# Patient Record
Sex: FEMALE | Race: WHITE | URBAN - METROPOLITAN AREA
[De-identification: names, ages, dates, MRNs, and addresses within clinical notes are randomized per-mention and may not be internally consistent; named-entity substitution may affect disease eponyms.]

---

## 2020-08-14 ENCOUNTER — EMERGENCY (EMERGENCY)
Facility: HOSPITAL | Age: 85
LOS: 1 days | Discharge: HOME | End: 2020-08-14
Attending: EMERGENCY MEDICINE
Payer: MEDICARE

## 2020-08-14 PROCEDURE — 99283 EMERGENCY DEPT VISIT LOW MDM: CPT | Mod: CS,GC

## 2020-08-14 RX ORDER — TETANUS AND DIPHTHERIA TOXOIDS ADSORBED 2; 2 [LF]/.5ML; [LF]/.5ML
0.5 INJECTION INTRAMUSCULAR ONCE
Refills: 0 | Status: COMPLETED | OUTPATIENT
Start: 2020-08-14 | End: 2020-08-14
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
## 2020-08-14 NOTE — ED PROVIDER NOTE - PHYSICAL EXAMINATION
General: NAD, well appearing  HEENT: Normocephalic, EOM intact  Neck: No apparent stiffness or JVD  Pulm: Chest wall symmetric and nontender, lungs clear to ascultation   Cardiac: Regular rate and rhythm  Abdomen: Nontender and nondistended  Skin: Skin in warm, Superficial scrrathes on hands and right leg, superficial and not bleeding  Neuro: No apparent motor or sensory deficits  Psych: Alert, oriented, and cooperative
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
## 2020-08-14 NOTE — ED PROVIDER NOTE - NS ED ROS FT
General: No fever, no chills  HEENT: No sensory changes, no sore throat  Neck: No neck pain, no stiffness  Resp: No SOB, no hemoptysis  Cardiovascular: No CP, No LOC  GI: No abdominal pain, No blood in stool  : No dysuria, no hematuria   MSK: No back pain, + limb pain  Neuro: No HA, No focal deficits

## 2020-08-14 NOTE — ED ADULT NURSE NOTE - CHPI ED NUR SYMPTOMS NEG
no change in level of consciousness/no loss of consciousness/no back pain/no chest wall tenderness/no chest pain/no abrasion/no blurred vision/no weakness/no seizure/no vomiting
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
## 2020-08-15 VITALS
OXYGEN SATURATION: 98 % | TEMPERATURE: 98 F | DIASTOLIC BLOOD PRESSURE: 78 MMHG | RESPIRATION RATE: 18 BRPM | SYSTOLIC BLOOD PRESSURE: 134 MMHG | HEART RATE: 76 BPM

## 2020-08-15 LAB — SARS-COV-2 RNA SPEC QL NAA+PROBE: SIGNIFICANT CHANGE UP

## 2020-08-15 PROCEDURE — G0378: CPT

## 2020-08-15 PROCEDURE — 73130 X-RAY EXAM OF HAND: CPT | Mod: 26,RT

## 2020-08-15 PROCEDURE — 99218: CPT | Mod: CS

## 2020-08-15 PROCEDURE — 90714 TD VACC NO PRESV 7 YRS+ IM: CPT

## 2020-08-15 PROCEDURE — 73130 X-RAY EXAM OF HAND: CPT

## 2020-08-15 PROCEDURE — 87635 SARS-COV-2 COVID-19 AMP PRB: CPT

## 2020-08-15 PROCEDURE — 99284 EMERGENCY DEPT VISIT MOD MDM: CPT | Mod: 25

## 2020-08-15 PROCEDURE — 71046 X-RAY EXAM CHEST 2 VIEWS: CPT | Mod: 26

## 2020-08-15 PROCEDURE — 90471 IMMUNIZATION ADMIN: CPT

## 2020-08-15 PROCEDURE — 71046 X-RAY EXAM CHEST 2 VIEWS: CPT

## 2020-08-15 RX ORDER — ACETAMINOPHEN 500 MG
650 TABLET ORAL ONCE
Refills: 0 | Status: COMPLETED | OUTPATIENT
Start: 2020-08-15 | End: 2020-08-15

## 2020-08-15 RX ADMIN — TETANUS AND DIPHTHERIA TOXOIDS ADSORBED 0.5 MILLILITER(S): 2; 2 INJECTION INTRAMUSCULAR at 01:16

## 2020-08-15 RX ADMIN — Medication 650 MILLIGRAM(S): at 02:02
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
## 2020-08-15 NOTE — ED ADULT NURSE REASSESSMENT NOTE - NS ED NURSE REASSESS COMMENT FT1
Pt ambulated without difficulty with 1 assist to bathroom, steady gait. Pt. remains smiling, calm, cooperative.
Pt. discharged by Dr. Sprague. Pt. to be transported to the [facility] by arabella at bedside.
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
## 2020-08-15 NOTE — ED CDU PROVIDER DISPOSITION NOTE - PATIENT PORTAL LINK FT
You can access the FollowMyHealth Patient Portal offered by [facility] by registering at the following website: [email or web address]. By joining "[employer]’s FollowMyHealth portal, you will also be able to view your health information using other applications (apps) compatible with our system.

## 2020-08-16 NOTE — ED POST DISCHARGE NOTE - RESULT SUMMARY
CXR- severe thoracic body height loss which is likely chronic however an acute component cannot be excluded
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
## 2021-10-10 NOTE — ED ADULT NURSE NOTE - NSIMPLEMENTINTERV_GEN_ALL_ED
(M6) obeys commands
Implemented All Fall with Harm Risk Interventions:  [place] to call system. Call bell, personal items and telephone within reach. Instruct patient to call for assistance. Room bathroom lighting operational. Non-slip footwear when patient is off stretcher. Physically safe environment: no spills, clutter or unnecessary equipment. Stretcher in lowest position, wheels locked, appropriate side rails in place. Provide visual cue, wrist band, yellow gown, etc. Monitor gait and stability. Monitor for mental status changes and reorient to person, place, and time. Review medications for side effects contributing to fall risk. Reinforce activity limits and safety measures with patient and family. Provide visual clues: red socks.

## 2021-10-16 ENCOUNTER — EMERGENCY (EMERGENCY)
Facility: HOSPITAL | Age: 86
LOS: 1 days | Discharge: HOME | End: 2021-10-16
Attending: STUDENT IN AN ORGANIZED HEALTH CARE EDUCATION/TRAINING PROGRAM
Payer: MEDICARE

## 2021-10-16 VITALS
HEART RATE: 87 BPM | SYSTOLIC BLOOD PRESSURE: 146 MMHG | DIASTOLIC BLOOD PRESSURE: 69 MMHG | RESPIRATION RATE: 17 BRPM | OXYGEN SATURATION: 95 %

## 2021-10-16 VITALS
DIASTOLIC BLOOD PRESSURE: 53 MMHG | RESPIRATION RATE: 18 BRPM | SYSTOLIC BLOOD PRESSURE: 91 MMHG | HEART RATE: 110 BPM | OXYGEN SATURATION: 93 % | TEMPERATURE: 98 F

## 2021-10-16 LAB
ALBUMIN SERPL ELPH-MCNC: 4.4 G/DL — SIGNIFICANT CHANGE UP (ref 3.3–5.2)
ALP SERPL-CCNC: 65 U/L — SIGNIFICANT CHANGE UP (ref 40–120)
ALT FLD-CCNC: 13 U/L — SIGNIFICANT CHANGE UP
ANION GAP SERPL CALC-SCNC: 16 MMOL/L — SIGNIFICANT CHANGE UP (ref 5–17)
ANISOCYTOSIS BLD QL: SLIGHT — SIGNIFICANT CHANGE UP
APPEARANCE UR: CLEAR — SIGNIFICANT CHANGE UP
AST SERPL-CCNC: 23 U/L — SIGNIFICANT CHANGE UP
BACTERIA # UR AUTO: ABNORMAL
BASE EXCESS BLDV CALC-SCNC: -3.9 MMOL/L — LOW (ref -2–3)
BASOPHILS # BLD AUTO: 0 K/UL — SIGNIFICANT CHANGE UP (ref 0–0.2)
BASOPHILS NFR BLD AUTO: 0 % — SIGNIFICANT CHANGE UP (ref 0–2)
BILIRUB SERPL-MCNC: 0.6 MG/DL — SIGNIFICANT CHANGE UP (ref 0.4–2)
BILIRUB UR-MCNC: NEGATIVE — SIGNIFICANT CHANGE UP
BUN SERPL-MCNC: 30.3 MG/DL — HIGH (ref 8–20)
CA-I SERPL-SCNC: 1.06 MMOL/L — LOW (ref 1.15–1.33)
CALCIUM SERPL-MCNC: 9.2 MG/DL — SIGNIFICANT CHANGE UP (ref 8.6–10.2)
CHLORIDE BLDV-SCNC: 99 MMOL/L — SIGNIFICANT CHANGE UP (ref 98–107)
CHLORIDE SERPL-SCNC: 95 MMOL/L — LOW (ref 98–107)
CO2 SERPL-SCNC: 24 MMOL/L — SIGNIFICANT CHANGE UP (ref 22–29)
COLOR SPEC: YELLOW — SIGNIFICANT CHANGE UP
CREAT SERPL-MCNC: 1.37 MG/DL — HIGH (ref 0.5–1.3)
DIFF PNL FLD: ABNORMAL
EOSINOPHIL # BLD AUTO: 0 K/UL — SIGNIFICANT CHANGE UP (ref 0–0.5)
EOSINOPHIL NFR BLD AUTO: 0 % — SIGNIFICANT CHANGE UP (ref 0–6)
EPI CELLS # UR: SIGNIFICANT CHANGE UP
GAS PNL BLDV: 130 MMOL/L — LOW (ref 136–145)
GAS PNL BLDV: SIGNIFICANT CHANGE UP
GLUCOSE BLDV-MCNC: 116 MG/DL — HIGH (ref 70–99)
GLUCOSE SERPL-MCNC: 105 MG/DL — HIGH (ref 70–99)
GLUCOSE UR QL: 250 MG/DL
GRAN CASTS # UR COMP ASSIST: ABNORMAL /LPF
HCO3 BLDV-SCNC: 21 MMOL/L — LOW (ref 22–29)
HCT VFR BLD CALC: 43.1 % — SIGNIFICANT CHANGE UP (ref 34.5–45)
HCT VFR BLDA CALC: 41 % — SIGNIFICANT CHANGE UP (ref 34–46)
HGB BLD CALC-MCNC: 13.5 G/DL — SIGNIFICANT CHANGE UP (ref 11.7–16.1)
HGB BLD-MCNC: 14.4 G/DL — SIGNIFICANT CHANGE UP (ref 11.5–15.5)
KETONES UR-MCNC: NEGATIVE — SIGNIFICANT CHANGE UP
LACTATE BLDV-MCNC: 1.7 MMOL/L — SIGNIFICANT CHANGE UP (ref 0.5–2)
LEUKOCYTE ESTERASE UR-ACNC: ABNORMAL
LYMPHOCYTES # BLD AUTO: 1.05 K/UL — SIGNIFICANT CHANGE UP (ref 1–3.3)
LYMPHOCYTES # BLD AUTO: 5.2 % — LOW (ref 13–44)
MACROCYTES BLD QL: SLIGHT — SIGNIFICANT CHANGE UP
MANUAL SMEAR VERIFICATION: SIGNIFICANT CHANGE UP
MCHC RBC-ENTMCNC: 31.9 PG — SIGNIFICANT CHANGE UP (ref 27–34)
MCHC RBC-ENTMCNC: 33.4 GM/DL — SIGNIFICANT CHANGE UP (ref 32–36)
MCV RBC AUTO: 95.4 FL — SIGNIFICANT CHANGE UP (ref 80–100)
MONOCYTES # BLD AUTO: 1.42 K/UL — HIGH (ref 0–0.9)
MONOCYTES NFR BLD AUTO: 7 % — SIGNIFICANT CHANGE UP (ref 2–14)
NEUTROPHILS # BLD AUTO: 17.78 K/UL — HIGH (ref 1.8–7.4)
NEUTROPHILS NFR BLD AUTO: 87.8 % — HIGH (ref 43–77)
NITRITE UR-MCNC: NEGATIVE — SIGNIFICANT CHANGE UP
OVALOCYTES BLD QL SMEAR: SLIGHT — SIGNIFICANT CHANGE UP
PCO2 BLDV: 37 MMHG — LOW (ref 39–42)
PH BLDV: 7.37 — SIGNIFICANT CHANGE UP (ref 7.32–7.43)
PH UR: 6.5 — SIGNIFICANT CHANGE UP (ref 5–8)
PLAT MORPH BLD: NORMAL — SIGNIFICANT CHANGE UP
PLATELET # BLD AUTO: 283 K/UL — SIGNIFICANT CHANGE UP (ref 150–400)
PO2 BLDV: 144 MMHG — HIGH (ref 25–45)
POIKILOCYTOSIS BLD QL AUTO: SLIGHT — SIGNIFICANT CHANGE UP
POTASSIUM BLDV-SCNC: 4.2 MMOL/L — SIGNIFICANT CHANGE UP (ref 3.5–5.1)
POTASSIUM SERPL-MCNC: 4.3 MMOL/L — SIGNIFICANT CHANGE UP (ref 3.5–5.3)
POTASSIUM SERPL-SCNC: 4.3 MMOL/L — SIGNIFICANT CHANGE UP (ref 3.5–5.3)
PROT SERPL-MCNC: 7.4 G/DL — SIGNIFICANT CHANGE UP (ref 6.6–8.7)
PROT UR-MCNC: 100 MG/DL
RBC # BLD: 4.52 M/UL — SIGNIFICANT CHANGE UP (ref 3.8–5.2)
RBC # FLD: 13.5 % — SIGNIFICANT CHANGE UP (ref 10.3–14.5)
RBC BLD AUTO: ABNORMAL
RBC CASTS # UR COMP ASSIST: SIGNIFICANT CHANGE UP /HPF (ref 0–4)
SAO2 % BLDV: 99 % — SIGNIFICANT CHANGE UP
SARS-COV-2 RNA SPEC QL NAA+PROBE: SIGNIFICANT CHANGE UP
SODIUM SERPL-SCNC: 135 MMOL/L — SIGNIFICANT CHANGE UP (ref 135–145)
SP GR SPEC: 1.01 — SIGNIFICANT CHANGE UP (ref 1.01–1.02)
TSH SERPL-MCNC: 3.95 UIU/ML — SIGNIFICANT CHANGE UP (ref 0.27–4.2)
UROBILINOGEN FLD QL: NEGATIVE MG/DL — SIGNIFICANT CHANGE UP
WBC # BLD: 20.25 K/UL — HIGH (ref 3.8–10.5)
WBC # FLD AUTO: 20.25 K/UL — HIGH (ref 3.8–10.5)
WBC UR QL: SIGNIFICANT CHANGE UP

## 2021-10-16 PROCEDURE — 71045 X-RAY EXAM CHEST 1 VIEW: CPT

## 2021-10-16 PROCEDURE — 85025 COMPLETE CBC W/AUTO DIFF WBC: CPT

## 2021-10-16 PROCEDURE — 82803 BLOOD GASES ANY COMBINATION: CPT

## 2021-10-16 PROCEDURE — 99284 EMERGENCY DEPT VISIT MOD MDM: CPT | Mod: 25

## 2021-10-16 PROCEDURE — 71045 X-RAY EXAM CHEST 1 VIEW: CPT | Mod: 26

## 2021-10-16 PROCEDURE — 87086 URINE CULTURE/COLONY COUNT: CPT

## 2021-10-16 PROCEDURE — 70450 CT HEAD/BRAIN W/O DYE: CPT | Mod: 26,MA

## 2021-10-16 PROCEDURE — 81001 URINALYSIS AUTO W/SCOPE: CPT

## 2021-10-16 PROCEDURE — 85018 HEMOGLOBIN: CPT

## 2021-10-16 PROCEDURE — 87635 SARS-COV-2 COVID-19 AMP PRB: CPT

## 2021-10-16 PROCEDURE — 99284 EMERGENCY DEPT VISIT MOD MDM: CPT

## 2021-10-16 PROCEDURE — 83605 ASSAY OF LACTIC ACID: CPT

## 2021-10-16 PROCEDURE — 74176 CT ABD & PELVIS W/O CONTRAST: CPT | Mod: 26,MA

## 2021-10-16 PROCEDURE — 82947 ASSAY GLUCOSE BLOOD QUANT: CPT

## 2021-10-16 PROCEDURE — 85014 HEMATOCRIT: CPT

## 2021-10-16 PROCEDURE — 84132 ASSAY OF SERUM POTASSIUM: CPT

## 2021-10-16 PROCEDURE — 70450 CT HEAD/BRAIN W/O DYE: CPT | Mod: MA

## 2021-10-16 PROCEDURE — 80053 COMPREHEN METABOLIC PANEL: CPT

## 2021-10-16 PROCEDURE — 84443 ASSAY THYROID STIM HORMONE: CPT

## 2021-10-16 PROCEDURE — 87040 BLOOD CULTURE FOR BACTERIA: CPT

## 2021-10-16 PROCEDURE — 82330 ASSAY OF CALCIUM: CPT

## 2021-10-16 PROCEDURE — 36415 COLL VENOUS BLD VENIPUNCTURE: CPT

## 2021-10-16 PROCEDURE — 84295 ASSAY OF SERUM SODIUM: CPT

## 2021-10-16 PROCEDURE — 82435 ASSAY OF BLOOD CHLORIDE: CPT

## 2021-10-16 PROCEDURE — 74176 CT ABD & PELVIS W/O CONTRAST: CPT | Mod: MA

## 2021-10-16 RX ORDER — SODIUM CHLORIDE 9 MG/ML
1000 INJECTION INTRAMUSCULAR; INTRAVENOUS; SUBCUTANEOUS ONCE
Refills: 0 | Status: COMPLETED | OUTPATIENT
Start: 2021-10-16 | End: 2021-10-16

## 2021-10-16 RX ADMIN — SODIUM CHLORIDE 1000 MILLILITER(S): 9 INJECTION INTRAMUSCULAR; INTRAVENOUS; SUBCUTANEOUS at 15:53

## 2021-10-16 NOTE — ED ADULT NURSE NOTE - NSIMPLEMENTINTERV_GEN_ALL_ED
Implemented All Fall Risk Interventions:  [place] to call system. Call bell, personal items and telephone within reach. Instruct patient to call for assistance. Room bathroom lighting operational. Non-slip footwear when patient is off stretcher. Physically safe environment: no spills, clutter or unnecessary equipment. Stretcher in lowest position, wheels locked, appropriate side rails in place. Provide visual cue, wrist band, yellow gown, etc. Monitor gait and stability. Monitor for mental status changes and reorient to person, place, and time. Review medications for side effects contributing to fall risk. Reinforce activity limits and safety measures with patient and family.

## 2021-10-16 NOTE — ED ADULT NURSE NOTE - RESPIRATORY ASSESSMENT
Will need to find a new provider- she still has not see coumadin clinic like I asked- I can't keep refilling this without labs- last refill. - - -

## 2021-10-16 NOTE — ED PROVIDER NOTE - OBJECTIVE STATEMENT
89yo woman PMH HTN, hypothyroidism, osteoporosis, arthritis, macular degeneration, dementia (A&Ox1-2 at baseline) was sent to the ED from [place] for evaluation for worsening confusion, slurring words, incontinence, difficulty walking (walks independently at baseline) x 2 days. Pt was seen by staff (Deb) at baseline 2 days ago. She was altered yesterday but declined evaluation in hospital but pt had worsening difficulty with ambulating and holding utensils and was sent to the ED.  No recent fever, cough, n/v/d.

## 2021-10-16 NOTE — ED PROVIDER NOTE - CLINICAL SUMMARY MEDICAL DECISION MAKING FREE TEXT BOX
89yo woman presents with worsening confusion and denies any complaints on exam with no focal neurologic deficits except disorientation. Will evaluation for metabolic/infectious etiology. Will also consider NPH/intra-cranial abnormality and obtain CTH.

## 2021-10-16 NOTE — ED PROVIDER NOTE - NSFOLLOWUPINSTRUCTIONS_ED_ALL_ED_FT
You were seen and evaluated in the emergency room for confusion.    Please follow up with your primary care provider in the next few days.  You should be evaluated by a neurologist. Call the number attached to make an appointment.     Continue all home medications as prescribed.    Drink plenty of fluids to stay hydrated.    Return to the Emergency Department immediately if you have fever, slurred speech, difficulty swallowing, change in vision, weakness in arms or legs, severe headache, or any new and concerning symptoms or concerns.     Please read all attached.

## 2021-10-16 NOTE — ED ADULT NURSE REASSESSMENT NOTE - NS ED NURSE REASSESS COMMENT FT1
assumed care o fpt Pt in no apparent distress at this time. Airway patent, breathing spontaneous and nonlabored. Pt resting in stretcher, no complaints at this time

## 2021-10-16 NOTE — ED PROVIDER NOTE - NSICDXPASTMEDICALHX_GEN_ALL_CORE_FT
PAST MEDICAL HISTORY:  Arthritis     Degeneration macular     Dementia     HTN (hypertension)     Hypothyroidism     Osteoporosis

## 2021-10-16 NOTE — ED PROVIDER NOTE - PROGRESS NOTE DETAILS
pt's family at bedside. discussed results of elevated WBC and UA not suggestive of UTI. due to unreliable exam, will obtain CT a/p to evaluate for occult intra-abdominal pathology. with no focal neurologic deficits and no gross abnormalities on CTH, less likely intra-cranial etiology. Discussed option of admission/obs for further evaluation with MRI but after discussion family, they wish for pt to be in familiar location at nursing home unless further imaging will  and will be able to take pt to be evaluated by outpt neurology if needed. pt had an episode of emesis but with no further nausea upon examination. no recurrent vomiting. CT a/p with no intra-abdominal pathology. discussed option of further work up with LP to evaluate for confusion/AMS but with no fever or other symptoms, pt's family declined. discussed YOSI likely pre-renal with family and need for hydration. pt s/p fluids. will discharge patient home at this time and family at bedside to take back to living facility. printed out copies of results for patient to take home. discussed return precautions and need for outpatient follow up with neurology.

## 2021-10-16 NOTE — ED PROVIDER NOTE - PATIENT PORTAL LINK FT
You can access the FollowMyHealth Patient Portal offered by [facility] by registering at the following website: [email or web address]. By joining [employer]’s FollowMyHealth portal, you will also be able to view your health information using other applications (apps) compatible with our system.

## 2021-10-16 NOTE — ED ADULT NURSE NOTE - SUICIDE SCREENING DEPRESSION
PAST MEDICAL HISTORY:  Adult Hypothyroidism     Arthritis     Borderline Diabetes Mellitus     Chronic Asthma     Cigarette Smoker     Diverticulosis     H/O: HTN     High Cholesterol     RA (Rheumatoid Arthritis)     Tooth Abscess Negative

## 2021-10-16 NOTE — ED PROVIDER NOTE - PHYSICAL EXAMINATION
General: elderly woman in no acute distress  Head: normocephalic, atraumatic  Eyes: pinpoint pupils bilaterally, EOMI  Mouth: moist mucous membranes  Neck: supple neck  CV: normal rate and rhythm, no LE edema, peripheral pulses 2+ bilateral UE and LE  Respiratory: clear to auscultation bilaterally, no crackles  Abdomen: soft, nontender, nondistended  : no suprapubic tenderness, no CVAT  MSK: no joint deformities  Neuro: alert and oriented x1, speech is clear but repetitive, symmetric smile, intermittently follows commands 5/5 strength in bilateral UE and LE, ambulates with minimal assistance with no gait instability  Skin: no rash noted

## 2021-10-16 NOTE — ED ADULT NURSE NOTE - OBJECTIVE STATEMENT
pt brought to ED by [facility] mobile stroke unit for facial droop, upon arrival pt alert to self only per family that is pts baseline, denies any pain or complaints, no weakness or facial droop noted upon arrival to ED, Respirations even and unlabored, pt stable. Pt brrought to ED by [facility] mobile stroke unit from [place] for evaluation for worsening confusion, slurring words, incontinence, difficulty walking (walks independently at baseline) x 2 days. Per niece She was altered yesterday and had worsening speech, difficulty with ambulating and holding utensils and was sent to the ED for eval of possible UTI niece states she noticed pt has not been properly cleaning herself at facility last few days and thinks she may possibly have UTI.. Upon arrival to ED pt able to ambulate to bathroom, no weakness or facial droop noted upon assessment, niece at bedside states pts speech and memory of past is slightly off from baseline

## 2021-10-16 NOTE — ED ADULT NURSE NOTE - CAS EDN DISCHARGE INTERVENTIONS
History     Chief Complaint:  Suicidal    The history is provided by the patient and the police.      Jihan Gill is a 41 year old female who presents via police for suicidal ideation. The patient was originally placed in room 17, however she was found in the room looking for something to hang herself so she was moved to [facility]. The patient states that she is staying at a hotel with her boyfriend in [place]. She left the hotel today to possibly go jump off a bridge, however she changed her mind and flagged down the police (Officer Kavin) while on the bridge who brought her in today. She states that she has been hearing voices and seeing people that are not there. She has multiple abrasions to her left lower arm. She states that she got a tattoo there 2 weeks ago and she no longer believes in the saying so she was trying to scratch it off with a griffin pin. The patient states that she has not taken medications yesterday or today. She states that she has been in treatment multiple times and when she gets out she starts having hallucinations again.     Allergies:  Abilify  Compazine  Dramamine  Reglan  Seroquel     Medications:    Acetaminophen (tylenol) 325 mg tablet  Buprenorphine hcl-naloxone hcl (suboxone) 8-2 mg per film  Cariprazine (vraylar) 1.5 mg caps capsule  Gabapentin (neurontin) 300 mg capsule  Hydroxyzine (atarax) 25 mg tablet  Ondansetron (zofran-odt) 4 mg odt tab  Vortioxetine (trintellix/brintellix) 20 mg tablet    Past Medical History:    Arthritis  Depressive disorder  Opiate addiction  Seizures  Urinary calculus  Bipolar disorder  Acute hepatitis C virus infection  Lithium overdose  Suicide attempt  Methadone overdose  Benzodiazepine dependence   Degenerative of lumbar or lumbosacral intervertebral disc    Past Surgical History:     section  ENT surgery  GYN surgery  Tonsillectomy    Family History:    History reviewed. No pertinent family history.    Social History:  Patient is  "  Tobacco Use: No  Alcohol Use: Yes  PCP: Physician No Ref-Primary     Review of Systems   Constitutional: Negative for fever.   Skin: Positive for wound.   Psychiatric/Behavioral: Positive for hallucinations, self-injury and suicidal ideas.   All other systems reviewed and are negative.    Physical Exam   First Vitals:  Patient Vitals for the past 24 hrs:   BP Temp Temp src Pulse Heart Rate Resp SpO2 Height Weight   04/03/19 1505 127/84 -- -- 131 -- 16 100 % -- --   04/03/19 1025 131/88 98.4  F (36.9  C) Oral -- 116 16 100 % 1.676 m (5' 6\") 74.8 kg (165 lb)     Physical Exam  General: No distress.   Head: No signs of trauma.   Mouth/Throat: Oropharynx moist.   Eyes: Conjunctivae are normal. Pupils are equal..   Neck: Normal range of motion.    Resp:No respiratory distress.   MSK: Normal range of motion. No obvious deformity.  Neuro: The patient is alert and interactive. LITTLEJOHN. Speech normal. GCS 15  Skin: No lesion or sign of trauma noted.   Psych: Admits to suicidal ideation and hearing voices. Appears anxious and tearful.      Emergency Department Course     Laboratory:  CBC:  WBC 6.4, HGB 11.4 low,   CMP: Potassium 3.2 low, Glucose 121 high,  high,  high, o/w WNL. (Creatinine 0.66)  Salicylate Level: <2  Acetaminophen Level: <2  Alcohol Ethyl: <0.01  Urine Drug Screen: Amphetamine positive.     Interventions:  1153: Neurontin 600mg PO  1156: Zyprexa 10mg PO  1236: Vortioxetine 20mg PO  1237: Vraylar 1.5mg PO     Emergency Department Course:  10:34 AM Nursing notes and vitals reviewed.  I performed an exam of the patient as documented above.     Patient will be admitted to psych.    Impression & Plan      Medical Decision Making:  Jihan Gill is a 41 year old female who presents with suicidal ideation.    Differential diagnosis broadly includes:    Drug- including opioids or alcohol. Drug abuse screen is negative as above including any toxic ingestion such as Tylenol, " salicylates or ethanol.There is no alcohol on board.     Metabolic- she is not hypoxic and shows no signs of hyper or hypoglycemia.      Intercranial abnormality- she had no history of trauma and no exam findings consistent with head trauma, therefore a CT head was not warranted. There were no focal neurologic findings consistent with this either.     Vascular- No history consistent with stroke, subarachnoid hemorrhage, or coronary ischemia. No headache or focal findings.    Psychiatric -Such as schizophrenia or major depression, this is most likely diagnosis given that there are no obvious findings on physical exam or on labs.    Infectious- Meningitis, encephalitis or bacteriemia, with her completely normal neuro exam, physical exam, it is very unlikely to be an infectious process at this time.     As phychiatric is the most likely etiology, DEC was consulted and they agreed that she required in patient work up and evaluation for her own safety. She will be admit to psych.  Diagnosis:    ICD-10-CM    1. Suicidal ideation R45.851 CBC with platelets differential     Comprehensive metabolic panel     Salicylate level     Acetaminophen level     Alcohol ethyl     Drug abuse screen 77 urine ([place],[facility])   2. Psychosis, unspecified psychosis type (H) F29        Disposition:  Admitted to psych.    I, Bradley Aasen, am serving as a scribe on 4/3/2019 at 10:34 AM to personally document services performed by [place] Joy MD based on my observations and the provider's statements to me.          Mt Joy MD  04/03/19 0118     IV discontinued, cath removed intact

## 2021-10-18 LAB
CULTURE RESULTS: SIGNIFICANT CHANGE UP
SPECIMEN SOURCE: SIGNIFICANT CHANGE UP

## 2021-10-19 NOTE — ED POST DISCHARGE NOTE - RESULT SUMMARY
CXR- No acute radiographic findings, small bilateral pleural effusions with adjacent atelectasis evident on concurrent abdominal CT

## 2021-10-19 NOTE — ED POST DISCHARGE NOTE - DETAILS
Pt currently residing at [facility], spoke to pt's nurse Evelina who states she will review these findings with the physician who is in house today Pt currently residing at [facility], spoke to pt's nurse Evelina who states she will review these findings with the physician who is in house today. Faxed XRAY report to facility [phone]

## 2021-10-21 LAB
CULTURE RESULTS: SIGNIFICANT CHANGE UP
CULTURE RESULTS: SIGNIFICANT CHANGE UP
SPECIMEN SOURCE: SIGNIFICANT CHANGE UP
SPECIMEN SOURCE: SIGNIFICANT CHANGE UP

## 2021-10-28 PROBLEM — M19.90 UNSPECIFIED OSTEOARTHRITIS, UNSPECIFIED SITE: Chronic | Status: ACTIVE | Noted: 2021-10-16

## 2021-10-28 PROBLEM — F03.90 UNSPECIFIED DEMENTIA, UNSPECIFIED SEVERITY, WITHOUT BEHAVIORAL DISTURBANCE, PSYCHOTIC DISTURBANCE, MOOD DISTURBANCE, AND ANXIETY: Chronic | Status: ACTIVE | Noted: 2021-10-16

## 2021-10-28 PROBLEM — I10 ESSENTIAL (PRIMARY) HYPERTENSION: Chronic | Status: ACTIVE | Noted: 2021-10-16

## 2021-10-28 PROBLEM — H35.30 UNSPECIFIED MACULAR DEGENERATION: Chronic | Status: ACTIVE | Noted: 2021-10-16

## 2021-10-28 PROBLEM — E03.9 HYPOTHYROIDISM, UNSPECIFIED: Chronic | Status: ACTIVE | Noted: 2021-10-16

## 2021-10-28 PROBLEM — Z00.00 ENCOUNTER FOR PREVENTIVE HEALTH EXAMINATION: Status: ACTIVE | Noted: 2021-10-28

## 2021-10-28 PROBLEM — M81.0 AGE-RELATED OSTEOPOROSIS WITHOUT CURRENT PATHOLOGICAL FRACTURE: Chronic | Status: ACTIVE | Noted: 2021-10-16

## 2021-11-24 ENCOUNTER — APPOINTMENT (OUTPATIENT)
Dept: NEUROLOGY | Facility: CLINIC | Age: 86
End: 2021-11-24
Payer: MEDICARE

## 2021-11-24 VITALS
WEIGHT: 110 LBS | DIASTOLIC BLOOD PRESSURE: 80 MMHG | SYSTOLIC BLOOD PRESSURE: 140 MMHG | HEIGHT: 59 IN | BODY MASS INDEX: 22.18 KG/M2

## 2021-11-24 DIAGNOSIS — G30.1 ALZHEIMER'S DISEASE WITH LATE ONSET: ICD-10-CM

## 2021-11-24 DIAGNOSIS — Z86.39 PERSONAL HISTORY OF OTHER ENDOCRINE, NUTRITIONAL AND METABOLIC DISEASE: ICD-10-CM

## 2021-11-24 DIAGNOSIS — Z86.79 PERSONAL HISTORY OF OTHER DISEASES OF THE CIRCULATORY SYSTEM: ICD-10-CM

## 2021-11-24 DIAGNOSIS — Z78.9 OTHER SPECIFIED HEALTH STATUS: ICD-10-CM

## 2021-11-24 DIAGNOSIS — Z82.0 FAMILY HISTORY OF EPILEPSY AND OTHER DISEASES OF THE NERVOUS SYSTEM: ICD-10-CM

## 2021-11-24 DIAGNOSIS — Z87.891 PERSONAL HISTORY OF NICOTINE DEPENDENCE: ICD-10-CM

## 2021-11-24 DIAGNOSIS — F02.80 ALZHEIMER'S DISEASE WITH LATE ONSET: ICD-10-CM

## 2021-11-24 PROCEDURE — 99204 OFFICE O/P NEW MOD 45 MIN: CPT

## 2021-11-24 RX ORDER — ASPIRIN 81 MG/1
81 TABLET ORAL DAILY
Qty: 90 | Refills: 3 | Status: ACTIVE | COMMUNITY
Start: 2021-11-24

## 2021-11-24 RX ORDER — AMLODIPINE BESYLATE 2.5 MG/1
2.5 TABLET ORAL
Refills: 0 | Status: ACTIVE | COMMUNITY

## 2021-11-24 RX ORDER — LEVOTHYROXINE SODIUM 0.03 MG/1
25 TABLET ORAL
Refills: 0 | Status: ACTIVE | COMMUNITY

## 2021-11-24 RX ORDER — SENNOSIDES 8.6 MG/1
8.6 TABLET ORAL
Refills: 0 | Status: ACTIVE | COMMUNITY

## 2021-11-24 RX ORDER — GALANTAMINE 12 MG/1
TABLET, FILM COATED ORAL
Refills: 0 | Status: ACTIVE | COMMUNITY

## 2021-11-24 RX ORDER — ACETAMINOPHEN 325 MG/1
325 TABLET ORAL
Refills: 0 | Status: ACTIVE | COMMUNITY

## 2021-11-24 RX ORDER — ROSUVASTATIN CALCIUM 5 MG/1
5 TABLET, FILM COATED ORAL
Refills: 0 | Status: ACTIVE | COMMUNITY

## 2021-11-24 RX ORDER — DICLOFENAC SODIUM 10 MG/G
1 GEL TOPICAL
Refills: 0 | Status: ACTIVE | COMMUNITY

## 2021-11-24 NOTE — ASSESSMENT
[FreeTextEntry1] : This is a 89-year-old woman who has a history of dementia. She lives in an assisted-living facility in a dementia unit. She had altered mental status with question of slurred speech likely in the setting of dehydration with possible undiagnosed infection. It is not clear to me that this was a TIA, however is difficult to tell based only on the description without seeing the patient when she was having her neurologic symptoms. To this and to be safe I would start a daily baby aspirin. I would continue galantamine for the dementia. I will see her back in the office in 3 months for followup.

## 2021-11-24 NOTE — DATA REVIEWED
[de-identified] : I reviewed images of a head CT that was done October 16, 2021. There is a significant amount of motion artifact and significantly degrade the images. Images that were able to be read did not show evidence for acute stroke mass or bleed. There did appear to be likely small vessel ischemic changes and atrophy consistent with age.\par \par I also reviewed online records from her ER visit from October 16, 2021

## 2021-11-24 NOTE — REVIEW OF SYSTEMS
[Confused or Disoriented] : confusion [Memory Lapses or Loss] : memory loss [Decr. Concentrating Ability] : decreased concentrating ability [As Noted in HPI] : as noted in HPI [Loss Of Hearing] : hearing loss [Negative] : Heme/Lymph

## 2021-11-24 NOTE — PHYSICAL EXAM
[General Appearance - Alert] : alert [General Appearance - In No Acute Distress] : in no acute distress [General Appearance - Well Nourished] : well nourished [General Appearance - Well Developed] : well developed [Person] : oriented to person [Place] : disoriented to place [Time] : disoriented to time [Short Term Intact] : short term memory impaired [Remote Intact] : remote memory intact [Registration Intact] : recent registration memory intact [Concentration Intact] : a decrease in concentrating ability was observed [Visual Intact] : visual attention was ~T not ~L decreased [Naming Objects] : no difficulty naming common objects [Fluency] : fluency intact [Comprehension] : comprehension intact [Current Events] : inadequate knowledge of current events [Cranial Nerves Optic (II)] : visual acuity intact bilaterally,  visual fields full to confrontation, pupils equal round and reactive to light [Cranial Nerves Oculomotor (III)] : extraocular motion intact [Cranial Nerves Trigeminal (V)] : facial sensation intact symmetrically [Cranial Nerves Facial (VII)] : face symmetrical [Cranial Nerves Vestibulocochlear (VIII)] : hearing was intact bilaterally [Cranial Nerves Glossopharyngeal (IX)] : tongue and palate midline [Cranial Nerves Accessory (XI - Cranial And Spinal)] : head turning and shoulder shrug symmetric [Cranial Nerves Hypoglossal (XII)] : there was no tongue deviation with protrusion [____] : [unfilled] mm [Motor Tone] : muscle tone was normal in all four extremities [Motor Strength] : muscle strength was normal in all four extremities [Involuntary Movements] : no involuntary movements were seen [No Muscle Atrophy] : normal bulk in all four extremities [Paresis Pronator Drift Right-Sided] : no pronator drift on the right [Paresis Pronator Drift Left-Sided] : no pronator drift on the left [Motor Strength Upper Extremities Bilaterally] : strength was normal in both upper extremities [Motor Strength Lower Extremities Bilaterally] : strength was normal in both lower extremities [Sensation Tactile Decrease] : light touch was intact [Sensation Pain / Temperature Decrease] : pain and temperature was intact [Sensation Vibration Decrease] : vibration was intact [Proprioception] : proprioception was intact [Abnormal Walk] : normal gait [Balance] : balance was intact [Tremor] : no tremor present [Coordination - Dysmetria Impaired Finger-to-Nose Bilateral] : not present [2+] : Ankle jerk left 2+ [Plantar Reflex Right Only] : normal on the right [Plantar Reflex Left Only] : normal on the left [Sclera] : the sclera and conjunctiva were normal [Extraocular Movements] : extraocular movements were intact [Full Visual Field] : full visual field [FreeTextEntry1] : pinpoint pupils

## 2021-11-24 NOTE — CONSULT LETTER
[Dear  ___] : Dear ~BREANNA, [Consult Letter:] : I had the pleasure of evaluating your patient, [unfilled]. [Please see my note below.] : Please see my note below. [Consult Closing:] : Thank you very much for allowing me to participate in the care of this patient.  If you have any questions, please do not hesitate to contact me. [Sincerely,] : Sincerely, [FreeTextEntry3] : Fausto Parker M.D., Ph.D. DPN-N\par [facility]\par Neurology at [place]\par Medical Director of Stroke Services\par [facility]\par

## 2021-11-24 NOTE — HISTORY OF PRESENT ILLNESS
[FreeTextEntry1] : Initial office visit November 24, 2021:\par This is a 89-year-old woman who presents today for neurologic evaluation. She was taken to the hospital on October 16 for altered mental status and dysarthria. She had a workup at the hospital which showed a white blood cell count of 20 as well as elevated BUN and creatinine. A head CT did not show any gross findings are as significantly degraded by motion artifact. She has a history of dementia, diagnosed 3 years ago. She is on galantamine for this. She is here today doing better than she was in the hospital but not completely back to her baseline. She is here for a neurologic evaluation.

## 2022-01-31 ENCOUNTER — TRANSCRIPTION ENCOUNTER (OUTPATIENT)
Age: 87
End: 2022-01-31

## 2022-02-01 ENCOUNTER — TRANSCRIPTION ENCOUNTER (OUTPATIENT)
Age: 87
End: 2022-02-01

## 2022-02-01 ENCOUNTER — INPATIENT (INPATIENT)
Facility: HOSPITAL | Age: 87
LOS: 15 days | Discharge: NURSING, REHAB OR LONG-TERM CARE | DRG: 480 | End: 2022-02-17
Attending: FAMILY MEDICINE
Payer: MEDICARE

## 2022-02-01 VITALS
OXYGEN SATURATION: 96 % | SYSTOLIC BLOOD PRESSURE: 174 MMHG | DIASTOLIC BLOOD PRESSURE: 116 MMHG | TEMPERATURE: 98 F | HEART RATE: 87 BPM | RESPIRATION RATE: 16 BRPM

## 2022-02-01 DIAGNOSIS — S72.142A DISPLACED INTERTROCHANTERIC FRACTURE OF LEFT FEMUR, INITIAL ENCOUNTER FOR CLOSED FRACTURE: ICD-10-CM

## 2022-02-01 LAB
ABO RH CONFIRMATION: SIGNIFICANT CHANGE UP
ALBUMIN SERPL ELPH-MCNC: 4 G/DL — SIGNIFICANT CHANGE UP (ref 3.3–5.2)
ALP SERPL-CCNC: 54 U/L — SIGNIFICANT CHANGE UP (ref 40–120)
ALT FLD-CCNC: 10 U/L — SIGNIFICANT CHANGE UP
ANION GAP SERPL CALC-SCNC: 14 MMOL/L — SIGNIFICANT CHANGE UP (ref 5–17)
APTT BLD: 23.9 SEC — LOW (ref 27.5–35.5)
AST SERPL-CCNC: 17 U/L — SIGNIFICANT CHANGE UP
BASOPHILS # BLD AUTO: 0.05 K/UL — SIGNIFICANT CHANGE UP (ref 0–0.2)
BASOPHILS NFR BLD AUTO: 0.3 % — SIGNIFICANT CHANGE UP (ref 0–2)
BILIRUB SERPL-MCNC: 0.2 MG/DL — LOW (ref 0.4–2)
BLD GP AB SCN SERPL QL: SIGNIFICANT CHANGE UP
BUN SERPL-MCNC: 26.9 MG/DL — HIGH (ref 8–20)
CALCIUM SERPL-MCNC: 9 MG/DL — SIGNIFICANT CHANGE UP (ref 8.6–10.2)
CHLORIDE SERPL-SCNC: 103 MMOL/L — SIGNIFICANT CHANGE UP (ref 98–107)
CO2 SERPL-SCNC: 20 MMOL/L — LOW (ref 22–29)
CREAT SERPL-MCNC: 0.88 MG/DL — SIGNIFICANT CHANGE UP (ref 0.5–1.3)
EOSINOPHIL # BLD AUTO: 0.01 K/UL — SIGNIFICANT CHANGE UP (ref 0–0.5)
EOSINOPHIL NFR BLD AUTO: 0.1 % — SIGNIFICANT CHANGE UP (ref 0–6)
GLUCOSE SERPL-MCNC: 161 MG/DL — HIGH (ref 70–99)
HCT VFR BLD CALC: 31.8 % — LOW (ref 34.5–45)
HGB BLD-MCNC: 10 G/DL — LOW (ref 11.5–15.5)
IMM GRANULOCYTES NFR BLD AUTO: 0.8 % — SIGNIFICANT CHANGE UP (ref 0–1.5)
INR BLD: 1.05 RATIO — SIGNIFICANT CHANGE UP (ref 0.88–1.16)
LYMPHOCYTES # BLD AUTO: 0.62 K/UL — LOW (ref 1–3.3)
LYMPHOCYTES # BLD AUTO: 3.4 % — LOW (ref 13–44)
MCHC RBC-ENTMCNC: 30.1 PG — SIGNIFICANT CHANGE UP (ref 27–34)
MCHC RBC-ENTMCNC: 31.4 GM/DL — LOW (ref 32–36)
MCV RBC AUTO: 95.8 FL — SIGNIFICANT CHANGE UP (ref 80–100)
MONOCYTES # BLD AUTO: 1.47 K/UL — HIGH (ref 0–0.9)
MONOCYTES NFR BLD AUTO: 8.1 % — SIGNIFICANT CHANGE UP (ref 2–14)
NEUTROPHILS # BLD AUTO: 15.88 K/UL — HIGH (ref 1.8–7.4)
NEUTROPHILS NFR BLD AUTO: 87.3 % — HIGH (ref 43–77)
PLATELET # BLD AUTO: 270 K/UL — SIGNIFICANT CHANGE UP (ref 150–400)
POTASSIUM SERPL-MCNC: 4.7 MMOL/L — SIGNIFICANT CHANGE UP (ref 3.5–5.3)
POTASSIUM SERPL-SCNC: 4.7 MMOL/L — SIGNIFICANT CHANGE UP (ref 3.5–5.3)
PROT SERPL-MCNC: 6.3 G/DL — LOW (ref 6.6–8.7)
PROTHROM AB SERPL-ACNC: 12.1 SEC — SIGNIFICANT CHANGE UP (ref 10.6–13.6)
RBC # BLD: 3.32 M/UL — LOW (ref 3.8–5.2)
RBC # FLD: 13.7 % — SIGNIFICANT CHANGE UP (ref 10.3–14.5)
SARS-COV-2 RNA SPEC QL NAA+PROBE: SIGNIFICANT CHANGE UP
SODIUM SERPL-SCNC: 137 MMOL/L — SIGNIFICANT CHANGE UP (ref 135–145)
WBC # BLD: 18.17 K/UL — HIGH (ref 3.8–10.5)
WBC # FLD AUTO: 18.17 K/UL — HIGH (ref 3.8–10.5)

## 2022-02-01 PROCEDURE — 27245 TREAT THIGH FRACTURE: CPT | Mod: LT

## 2022-02-01 PROCEDURE — 99223 1ST HOSP IP/OBS HIGH 75: CPT

## 2022-02-01 PROCEDURE — 99221 1ST HOSP IP/OBS SF/LOW 40: CPT | Mod: 57,25

## 2022-02-01 PROCEDURE — 73502 X-RAY EXAM HIP UNI 2-3 VIEWS: CPT | Mod: 26,LT

## 2022-02-01 PROCEDURE — 99221 1ST HOSP IP/OBS SF/LOW 40: CPT | Mod: 25,57

## 2022-02-01 PROCEDURE — 27095 INJECTION FOR HIP X-RAY: CPT | Mod: LT

## 2022-02-01 PROCEDURE — 99285 EMERGENCY DEPT VISIT HI MDM: CPT

## 2022-02-01 PROCEDURE — 93010 ELECTROCARDIOGRAM REPORT: CPT

## 2022-02-01 PROCEDURE — 72192 CT PELVIS W/O DYE: CPT | Mod: 26,MD,59

## 2022-02-01 PROCEDURE — 72125 CT NECK SPINE W/O DYE: CPT | Mod: 26,MA

## 2022-02-01 PROCEDURE — 70450 CT HEAD/BRAIN W/O DYE: CPT | Mod: 26,MA

## 2022-02-01 PROCEDURE — 74177 CT ABD & PELVIS W/CONTRAST: CPT | Mod: 26,MA

## 2022-02-01 PROCEDURE — 71260 CT THORAX DX C+: CPT | Mod: 26,MA

## 2022-02-01 PROCEDURE — 27245 TREAT THIGH FRACTURE: CPT | Mod: AS,LT

## 2022-02-01 PROCEDURE — 71045 X-RAY EXAM CHEST 1 VIEW: CPT | Mod: 26

## 2022-02-01 DEVICE — KIT SYRINGE TRAUMACEM V PLUS STRL: Type: IMPLANTABLE DEVICE | Status: FUNCTIONAL

## 2022-02-01 DEVICE — BLADE TFNA HELICAL 90MM: Type: IMPLANTABLE DEVICE | Status: FUNCTIONAL

## 2022-02-01 DEVICE — GRAFT BONE INJ TRAUMACEM TM V PLUS BONE CEMENT: Type: IMPLANTABLE DEVICE | Status: FUNCTIONAL

## 2022-02-01 DEVICE — GWIRE 3.2X400MM SS: Type: IMPLANTABLE DEVICE | Status: FUNCTIONAL

## 2022-02-01 DEVICE — NAIL CANN TFNA 130 DEG 12X170MM STRL: Type: IMPLANTABLE DEVICE | Status: FUNCTIONAL

## 2022-02-01 DEVICE — SCREW LOKG STRL 5X34MM: Type: IMPLANTABLE DEVICE | Status: FUNCTIONAL

## 2022-02-01 DEVICE — GRAFT BONE INJ CANN TRAUMACEM FOR TFNA: Type: IMPLANTABLE DEVICE | Status: FUNCTIONAL

## 2022-02-01 RX ORDER — ATORVASTATIN CALCIUM 80 MG/1
20 TABLET, FILM COATED ORAL AT BEDTIME
Refills: 0 | Status: DISCONTINUED | OUTPATIENT
Start: 2022-02-01 | End: 2022-02-01

## 2022-02-01 RX ORDER — SENNA PLUS 8.6 MG/1
2 TABLET ORAL AT BEDTIME
Refills: 0 | Status: DISCONTINUED | OUTPATIENT
Start: 2022-02-01 | End: 2022-02-01

## 2022-02-01 RX ORDER — ATORVASTATIN CALCIUM 80 MG/1
20 TABLET, FILM COATED ORAL AT BEDTIME
Refills: 0 | Status: ACTIVE | OUTPATIENT
Start: 2022-02-01 | End: 2022-12-31

## 2022-02-01 RX ORDER — CEFAZOLIN SODIUM 1 G
2000 VIAL (EA) INJECTION
Refills: 0 | Status: COMPLETED | OUTPATIENT
Start: 2022-02-01 | End: 2022-02-02

## 2022-02-01 RX ORDER — OXYCODONE HYDROCHLORIDE 5 MG/1
5 TABLET ORAL
Refills: 0 | Status: DISCONTINUED | OUTPATIENT
Start: 2022-02-01 | End: 2022-02-04

## 2022-02-01 RX ORDER — ACETAMINOPHEN 500 MG
975 TABLET ORAL ONCE
Refills: 0 | Status: COMPLETED | OUTPATIENT
Start: 2022-02-01 | End: 2022-02-01

## 2022-02-01 RX ORDER — OXYCODONE HYDROCHLORIDE 5 MG/1
10 TABLET ORAL
Refills: 0 | Status: DISCONTINUED | OUTPATIENT
Start: 2022-02-01 | End: 2022-02-04

## 2022-02-01 RX ORDER — AMLODIPINE BESYLATE 2.5 MG/1
2.5 TABLET ORAL DAILY
Refills: 0 | Status: DISCONTINUED | OUTPATIENT
Start: 2022-02-01 | End: 2022-02-07

## 2022-02-01 RX ORDER — ACETAMINOPHEN 500 MG
975 TABLET ORAL EVERY 8 HOURS
Refills: 0 | Status: ACTIVE | OUTPATIENT
Start: 2022-02-01 | End: 2022-12-31

## 2022-02-01 RX ORDER — HYDROMORPHONE HYDROCHLORIDE 2 MG/ML
0.5 INJECTION INTRAMUSCULAR; INTRAVENOUS; SUBCUTANEOUS EVERY 4 HOURS
Refills: 0 | Status: DISCONTINUED | OUTPATIENT
Start: 2022-02-01 | End: 2022-02-07

## 2022-02-01 RX ORDER — SODIUM CHLORIDE 9 MG/ML
1000 INJECTION INTRAMUSCULAR; INTRAVENOUS; SUBCUTANEOUS
Refills: 0 | Status: DISCONTINUED | OUTPATIENT
Start: 2022-02-01 | End: 2022-02-03

## 2022-02-01 RX ORDER — SODIUM CHLORIDE 9 MG/ML
250 INJECTION INTRAMUSCULAR; INTRAVENOUS; SUBCUTANEOUS ONCE
Refills: 0 | Status: COMPLETED | OUTPATIENT
Start: 2022-02-01 | End: 2022-02-01

## 2022-02-01 RX ORDER — LEVOTHYROXINE SODIUM 125 MCG
25 TABLET ORAL DAILY
Refills: 0 | Status: ACTIVE | OUTPATIENT
Start: 2022-02-01 | End: 2022-12-31

## 2022-02-01 RX ORDER — FENTANYL CITRATE 50 UG/ML
25 INJECTION INTRAVENOUS
Refills: 0 | Status: DISCONTINUED | OUTPATIENT
Start: 2022-02-01 | End: 2022-02-01

## 2022-02-01 RX ORDER — AMLODIPINE BESYLATE 2.5 MG/1
2.5 TABLET ORAL DAILY
Refills: 0 | Status: DISCONTINUED | OUTPATIENT
Start: 2022-02-01 | End: 2022-02-01

## 2022-02-01 RX ORDER — SENNA PLUS 8.6 MG/1
2 TABLET ORAL AT BEDTIME
Refills: 0 | Status: ACTIVE | OUTPATIENT
Start: 2022-02-01 | End: 2022-12-31

## 2022-02-01 RX ORDER — MORPHINE SULFATE 50 MG/1
4 CAPSULE, EXTENDED RELEASE ORAL
Refills: 0 | Status: DISCONTINUED | OUTPATIENT
Start: 2022-02-01 | End: 2022-02-04

## 2022-02-01 RX ORDER — TRANEXAMIC ACID 100 MG/ML
1000 INJECTION, SOLUTION INTRAVENOUS ONCE
Refills: 0 | Status: ACTIVE | OUTPATIENT
Start: 2022-02-01 | End: 2022-02-01

## 2022-02-01 RX ORDER — LEVOTHYROXINE SODIUM 125 MCG
25 TABLET ORAL DAILY
Refills: 0 | Status: DISCONTINUED | OUTPATIENT
Start: 2022-02-01 | End: 2022-02-01

## 2022-02-01 RX ORDER — ENOXAPARIN SODIUM 100 MG/ML
40 INJECTION SUBCUTANEOUS DAILY
Refills: 0 | Status: ACTIVE | OUTPATIENT
Start: 2022-02-02 | End: 2023-01-01

## 2022-02-01 RX ADMIN — Medication 975 MILLIGRAM(S): at 23:08

## 2022-02-01 RX ADMIN — SODIUM CHLORIDE 75 MILLILITER(S): 9 INJECTION INTRAMUSCULAR; INTRAVENOUS; SUBCUTANEOUS at 23:04

## 2022-02-01 RX ADMIN — ATORVASTATIN CALCIUM 20 MILLIGRAM(S): 80 TABLET, FILM COATED ORAL at 23:09

## 2022-02-01 RX ADMIN — SENNA PLUS 2 TABLET(S): 8.6 TABLET ORAL at 23:08

## 2022-02-01 RX ADMIN — SODIUM CHLORIDE 250 MILLILITER(S): 9 INJECTION INTRAMUSCULAR; INTRAVENOUS; SUBCUTANEOUS at 12:29

## 2022-02-01 RX ADMIN — Medication 1 DROP(S): at 23:15

## 2022-02-01 RX ADMIN — SODIUM CHLORIDE 75 MILLILITER(S): 9 INJECTION INTRAMUSCULAR; INTRAVENOUS; SUBCUTANEOUS at 17:01

## 2022-02-01 RX ADMIN — Medication 100 MILLIGRAM(S): at 23:09

## 2022-02-01 RX ADMIN — Medication 975 MILLIGRAM(S): at 12:29

## 2022-02-01 NOTE — DISCHARGE NOTE PROVIDER - NSDCCPCAREPLAN_GEN_ALL_CORE_FT
PRINCIPAL DISCHARGE DIAGNOSIS  Diagnosis: Intertrochanteric fracture of left hip  Assessment and Plan of Treatment:       SECONDARY DISCHARGE DIAGNOSES  Diagnosis: Dementia  Assessment and Plan of Treatment:     Diagnosis: Severe protein-calorie malnutrition  Assessment and Plan of Treatment:     Diagnosis: History of TIAs  Assessment and Plan of Treatment:

## 2022-02-01 NOTE — DISCHARGE NOTE PROVIDER - HOSPITAL COURSE
89 yr old female with dementia, hypothyroidism, TIA presented after a fall, imaging revealed an acute left intertrochanteric fracture, labs with WBC 18 on admission. She is s/p IM nail of left hip. Post operative course was complicated by anemia, Hb 7.4. PRBC transfusion was given. Also developed urinary retention requiring Stover placement. She developed agitation and pulled out Stover, required constant observation. Her Hb did not improve, was 7.4 on 2/3, hence additional PRBC ordered and CT abdomen/pelvis with hip hematoma.  Hgb has been stable.  S/p IMN on 2/1.      Acute lt comminuted intertrochanteric fracture due to fall s/p IM Nails on feb 1   - pain control  - dvt ppx  -PT eval   - dispo: [facility].  - palliative care team following .   - cont pt/ot daily as able.      Dementia  with delirium  - with intermittent  episode of agitation , impulsive behaviour   -calm for last 24 hr  - cont risperidone , seroquel prn   pain meds.       Urinary retention stover in place.    -continue Flomax.      Vit d deficiency    -started ergocalciferol 36706 unit weekly     Constipation - bowel regimen.  KUB ordered cont miralxa senna  will give lactulose if still no BM by evening add enema     hx TIA:   On aspirin and statin    Hypothyroidism: Continue Synthroid     Severe protein manjeet mlanutrition   cont diet and ensure   MVI with minerals      Anemia post op   iron studies reviewed   cont  ferrous sulfate vit c daily   Hb stable    DVT ppx - on heparin   disposition: [facility] byrd for safe disposition 89 yr old female with dementia, hypothyroidism, TIA presented after a fall, imaging revealed an acute left intertrochanteric fracture, labs with WBC 18 on admission. She is s/p IM nail of left hip. Post operative course was complicated by anemia, Hb 7.4. PRBC transfusion was given. Also developed urinary retention requiring Stover placement. She developed agitation and pulled out Stover, required constant observation. Her Hb did not improve, was 7.4 on 2/3, hence additional PRBC ordered and CT abdomen/pelvis with hip hematoma.  Hgb has been stable.  S/p IMN on 2/1.      Acute lt comminuted intertrochanteric fracture due to fall s/p IM Nails on feb 1   - pain control  - dvt ppx  -PT eval dispo: [facility].  - palliative care team following .   - cont pt/ot daily as able.      Dementia  with delirium  - with intermittent  episode of agitation , impulsive behaviour   -calm  - cont risperidone , seroquel prn   pain meds.       Urinary retention stover in place.    -continue Flomax.      Vit d deficiency    -started ergocalciferol 18434 unit weekly     Constipation - bowel regimen.  KUB ordered cont miralxa senna  will give lactulose if still no BM by evening add enema     hx TIA:   On aspirin and statin    Hypothyroidism: Continue Synthroid     Severe protein manjeet mlanutrition   cont diet and ensure   MVI with minerals      Anemia post op   iron studies reviewed   cont  ferrous sulfate vit c daily   Hb stable    DVT ppx - on heparin   disposition: [facility] byrd for safe disposition

## 2022-02-01 NOTE — ED ADULT NURSE NOTE - NSIMPLEMENTINTERV_GEN_ALL_ED
Implemented All Fall with Harm Risk Interventions:  [place] to call system. Call bell, personal items and telephone within reach. Instruct patient to call for assistance. Room bathroom lighting operational. Non-slip footwear when patient is off stretcher. Physically safe environment: no spills, clutter or unnecessary equipment. Stretcher in lowest position, wheels locked, appropriate side rails in place. Provide visual cue, wrist band, yellow gown, etc. Monitor gait and stability. Monitor for mental status changes and reorient to person, place, and time. Review medications for side effects contributing to fall risk. Reinforce activity limits and safety measures with patient and family. Provide visual clues: red socks.

## 2022-02-01 NOTE — ED ADULT TRIAGE NOTE - CHIEF COMPLAINT QUOTE
BIBEMS from [facility] s/p unwitnessed fall with L hip pain, + deformity/shortening, on ASA. Patient baseline dementia alert to person only. Unknown LOC or if she hit her head. Patient very [place].

## 2022-02-01 NOTE — CONSULT NOTE ADULT - SUBJECTIVE AND OBJECTIVE BOX
Pt Name: DANISH MICHAELS    MRN: [id number]      Patient is a 89y Female presenting to the emergency department with a chief complaint of fall   Patient is a 89y old  Female who presents with a chief complaint of fall.  patient with hx of dementia, hypothyroid, TIA who presented s/p unwitnessed fall. patient lives in dementia day, with niece who is giving hx.  Staff went into room at 630 and found patient on ground c/o hip pain       REVIEW OF SYSTEMS    left leg pain, otherwise UTO due to hx     PAST MEDICAL & SURGICAL HISTORY:  PAST MEDICAL & SURGICAL HISTORY:  HTN (hypertension)    Dementia    Osteoporosis    Arthritis    Hypothyroidism    Degeneration macular    Poor historian    [place] (hard of hearing)        Allergies: No Known Allergies      Medications: acetaminophen     Tablet .. 975 milliGRAM(s) Oral once  sodium chloride 0.9% Bolus 250 milliLiter(s) IV Bolus once      FAMILY HISTORY:  : non-contributory    Social History: lives in dementia day     Ambulation: Walking independently                           10.0   18.17 )-----------( 270      ( 01 Feb 2022 08:31 )             31.8       02-01    137  |  103  |  26.9<H>  ----------------------------<  161<H>  4.7   |  20.0<L>  |  0.88    Ca    9.0      01 Feb 2022 08:31    TPro  6.3<L>  /  Alb  4.0  /  TBili  0.2<L>  /  DBili  x   /  AST  17  /  ALT  10  /  AlkPhos  54  02-01      Vital Signs Last 24 Hrs  T(C): 36.6 (01 Feb 2022 07:04), Max: 36.6 (01 Feb 2022 07:04)  T(F): 97.9 (01 Feb 2022 07:04), Max: 97.9 (01 Feb 2022 07:04)  HR: 87 (01 Feb 2022 07:04) (87 - 87)  BP: 174/116 (01 Feb 2022 07:04) (174/116 - 174/116)  BP(mean): --  RR: 16 (01 Feb 2022 07:04) (16 - 16)  SpO2: 96% (01 Feb 2022 07:04) (96% - 96%)    Daily     Daily       PHYSICAL EXAM:      Appearance: Confused responsive, in no acute distress.    Musculoskeletal:         Left Lower Extremity: positive external rotation of leg with shortening,  no tenderness to knee, or ankle. foot warm and perfused.    positive chronic vascular changes to lower leg       Imaging Studies:  2 view left hip with pelvis reveals positive IT fx     A/P:  Pt is a 89y Female with found to have Left IT fx     PLAN:   * NPO for OR  * optimization for OR   *Pain control  * Bed rest    Case discussed with Dr. Martino

## 2022-02-01 NOTE — DISCHARGE NOTE PROVIDER - ATTENDING DISCHARGE PHYSICAL EXAMINATION:
T(C): 36.6 (02-17-22 @ 11:16), Max: 36.8 (02-17-22 @ 05:18)  HR: 71 (02-17-22 @ 11:16) (71 - 74)  BP: 135/67 (02-17-22 @ 11:16) (126/60 - 135/67)  RR: 18 (02-17-22 @ 05:18) (18 - 18)  SpO2: 92% (02-17-22 @ 11:16) (90% - 92%)    GEN - NAD  HEENT - NCAT, EOMI, FREDY,   RESP - CTA BL, no wheeze/rhonchi/crackles.   CARDIO - NS1S2, RRR. No murmurs  ABD - Soft/Non tender/Non distended. Normal BS x4 quadrants.   Ext - No MARK.   MSK - full ROM of BL upper and lower extremities without pain or restriction  Neuro - cn 2-12 grossly intact. no visible seizure activity appreciated.  Skin - clean, dry, intact. no rashes   Psych- AAOx1. confuse,calm,coop

## 2022-02-01 NOTE — H&P ADULT - EXTREMITIES COMMENTS
L leg shortened and externally rotated. Very painful any ROM.    NT AT, FROM no pain All else.  Sensation intact throughout.

## 2022-02-01 NOTE — ED ADULT NURSE NOTE - OBJECTIVE STATEMENT
89 F, nad, confused, [place], alert to self only, noted to have externally rotated and shortened LLE with swelling to left hip noted, tender to palpation at site and pain with movement of LLE.

## 2022-02-01 NOTE — H&P ADULT - MENTAL STATUS
A&OX person only.    Answers questions appropriately but is unaware where she is, where she lives or came from, why she is here.

## 2022-02-01 NOTE — DISCHARGE NOTE PROVIDER - NSDCMRMEDTOKEN_GEN_ALL_CORE_FT
amLODIPine 2.5 mg oral tablet: 1 tab(s) orally once a day  aspirin 81 mg oral tablet: 1 tab(s) orally once a day  Balmex topical ointment: Apply topically to affected area 2 times a day  diclofenac 1% topical gel: Apply topically to affected area once a day  Refresh Relieva ophthalmic solution: 1 drop(s) to each affected eye 2 times a day right eye  rosuvastatin 5 mg oral tablet: 1 tab(s) orally once a day  Senna 8.6 mg oral tablet: 1 tab(s) orally 2 times a day  Synthroid 25 mcg (0.025 mg) oral tablet: 1 tab(s) orally once a day   acetaminophen 325 mg oral tablet: 3 tab(s) orally every 8 hours  ascorbic acid 500 mg oral tablet: 1 tab(s) orally 2 times a day  aspirin 81 mg oral tablet, chewable: 1 tab(s) orally once a day  atorvastatin 20 mg oral tablet: 1 tab(s) orally once a day (at bedtime)  bisacodyl 10 mg rectal suppository: 1 suppository(ies) rectal once a day, As needed, Constipation  celecoxib 200 mg oral capsule: 1 cap(s) orally once a day  enoxaparin: 40 milligram(s) subcutaneous once a day for 17 days  ergocalciferol: 74922 unit(s) orally once a week  ferrous sulfate 325 mg (65 mg elemental iron) oral tablet: 1 tab(s) orally once a day  levothyroxine 25 mcg (0.025 mg) oral tablet: 1 tab(s) orally once a day  melatonin 3 mg oral tablet: 1 tab(s) orally once a day (at bedtime)  mineral oil rectal enema: 133 milliliter(s) rectal once, As needed, persistent constipation  ocular lubricant ophthalmic solution: 1 drop(s) to each affected eye every 6 hours  polyethylene glycol 3350 oral powder for reconstitution: 17 gram(s) orally 2 times a day  QUEtiapine: 12.5 milligram(s) orally every 8 hours, As Needed agitation  risperiDONE 0.5 mg oral tablet, disintegratin tab(s) orally once a day  senna oral tablet: 2 tab(s) orally once a day (at bedtime)  tamsulosin 0.4 mg oral capsule: 1 cap(s) orally once a day (at bedtime)

## 2022-02-01 NOTE — ED ADULT NURSE NOTE - NSICDXPASTMEDICALHX_GEN_ALL_CORE_FT
PAST MEDICAL HISTORY:  Arthritis     Degeneration macular     Dementia     [place] (hard of hearing)     HTN (hypertension)     Hypothyroidism     Osteoporosis     Poor historian

## 2022-02-01 NOTE — PROGRESS NOTE ADULT - SUBJECTIVE AND OBJECTIVE BOX
Orthopedic PA Postop Note  Patient S/P LEFT HIP IM NAIL  Patient in bed comfortable   LEFT Leg  Dressing C/D/I   DP Pulse intact   Calf Soft NT  Motor/sensory exam limited secondary to pts dementia-- spontaneous dorsi/plantarflexion noted.    Vital Signs Last 24 Hrs  T(C): 36.4 (01 Feb 2022 20:34), Max: 37.2 (01 Feb 2022 13:56)  T(F): 97.6 (01 Feb 2022 20:34), Max: 99 (01 Feb 2022 13:56)  HR: 90 (01 Feb 2022 20:34) (70 - 90)  BP: 93/62 (01 Feb 2022 20:34) (81/56 - 174/116)  BP(mean): 74 (01 Feb 2022 19:00) (62 - 94)  RR: 15 (01 Feb 2022 19:00) (13 - 20)  SpO2: 100% (01 Feb 2022 20:34) (96% - 100%)          A/P:  S/P LEFT HIP IM NAIL  1. DVTP - LOVEONX  2. Physical Therapy   3. Pain Control as clinically indicated

## 2022-02-01 NOTE — DISCHARGE NOTE PROVIDER - CARE PROVIDER_API CALL
Armand Martino)  Orthopaedic Surgery  [address] [place] [address]  Phone: [phone]  Fax: [phone]  Follow Up Time: 2 weeks

## 2022-02-01 NOTE — CONSULT NOTE ADULT - SUBJECTIVE AND OBJECTIVE BOX
89 yr old female with advanced dementia, hypertension, TIA presented from assisted living after an unwitnessed fall. Niece at bedside states that facility found patient on the floor, unsure if she fell off the bed or walked and tripped leading to a fall. Patient alert but confused, not a good historian, denies pain. Imaging has revealed a left hip fracture.  Niece reports patient has been a resident of the facility since March 2020, recent TIA, sees a neurologist. Walks without any devices. No known cardiac disease.    PMH:  As above  skin cancer    social:  former smoker  former alcohol    surgical history:  surgery for skin cancer.    family history: non contributory.    On exam  alert, frail elderly female  alert, to self  lungs: b/l air entry  cvs: regular  abdo: soft, bs+  ext: no edema, tenderness    labs and imaging noted  WBC 18

## 2022-02-01 NOTE — H&P ADULT - NSICDXPASTMEDICALHX_GEN_ALL_CORE_FT
PAST MEDICAL HISTORY:  Arthritis     Degeneration macular     Dementia     [place] (hard of hearing)     HTN (hypertension)     Hypothyroidism     Osteoporosis     Poor historian

## 2022-02-01 NOTE — ED ADULT NURSE NOTE - ED STAT RN HANDOFF DETAILS
Late Note approx 1330  Per OR holding RN Henok Vargas pt was being sent for and pt therefore remained with this RN until transport came to bring pt to OR.

## 2022-02-01 NOTE — ED PROVIDER NOTE - OBJECTIVE STATEMENT
89 yo female hx dementia, HTN, Hypothyroidism, Osteoporosis, Arthritis presents s/p fall out of bed at [facility] earlier today. Patient noted to have visible leg shortening on exam. Patient notes some left hip pain on exam. She is unsure if she hit her head. History limited secondary to patient's baseline dementia.

## 2022-02-01 NOTE — CONSULT NOTE ADULT - ATTENDING COMMENTS
Orthopaedic Trauma Surgeon Addendum:    I have personally performed a face-to-face diagnostic evaluation on this patient.  I have reviewed the physician assistant note and agree with the history, exam, and plan of care, except as noted.    Orthopedic Surgery is ready to proceed with surgery pending medical optimization and adequate Operating Room availability. Risks of surgical delay discussed with other providers and staff. Please call with any questions or concerns.     Armand Martino MD  Orthopaedic Trauma Surgeon  [facility]

## 2022-02-01 NOTE — DISCHARGE NOTE PROVIDER - DETAILS OF MALNUTRITION DIAGNOSIS/DIAGNOSES
This patient has been assessed with a concern for Malnutrition and was treated during this hospitalization for the following Nutrition diagnosis/diagnoses:     -  02/06/2022: Severe protein-calorie malnutrition

## 2022-02-01 NOTE — DISCHARGE NOTE PROVIDER - NSDCFUADDINST_GEN_ALL_CORE_FT
The patient will be seen in the office between 2-3 weeks for wound check. Tape will be removed at that time. Patient may shower after post-op day #5. The dressing is to be removed on 2/9/22. IF THE DRESSING BECOMES SOILED BEFORE THE REMOVAL DATE, CHANGE WITH A SIMILAR DRESSING. IF THE DRESSING BECOMES STAINED WITH DISCHARGE, CONTACT THE OFFICE FOR FURTHER DIRECTIONS.  The patient will contact the office if the wound becomes red, has increasing pain, develops bleeding or discharge, an injury occurs, or has other concerns. The patient will continue PT for gait training. The patient will continue LOVENOX for 4 weeks for blood clot prevention. The patient will take OXYCODONE AND TYLENOL for pain control and titrate according to prescription and patient needs. The patient will take Colace while taking oxycodone to prevent narcotic associated constipation.  Additionally, increase water intake (drink at least 8 glasses of water daily) and try adding fiber to the diet by eating fruits, vegetables and foods that are rich in grains. If constipation is experienced, contact the medical/primary care provider to discuss further treatment options. The patient is FULL weight bearing.         The patient will be seen in the office between 2-3 weeks for wound check. Tape will be removed at that time. Patient may shower after post-op day #5. The dressing is to be removed on 2/9/22. IF THE DRESSING BECOMES SOILED BEFORE THE REMOVAL DATE, CHANGE WITH A SIMILAR DRESSING. IF THE DRESSING BECOMES STAINED WITH DISCHARGE, CONTACT THE OFFICE FOR FURTHER DIRECTIONS.  The patient will contact the office if the wound becomes red, has increasing pain, develops bleeding or discharge, an injury occurs, or has other concerns. The patient will continue PT for gait training. The patient will continue LOVENOX for 4 weeks for blood clot prevention. The patient will take OXYCODONE AND TYLENOL for pain control and titrate according to prescription and patient needs. The patient will take Colace while taking oxycodone to prevent narcotic associated constipation.  Additionally, increase water intake (drink at least 8 glasses of water daily) and try adding fiber to the diet by eating fruits, vegetables and foods that are rich in grains. If constipation is experienced, contact the medical/primary care provider to discuss further treatment options. The patient is FULL weight bearing.    fall precaution  aspiration precaution

## 2022-02-01 NOTE — ED PROVIDER NOTE - PHYSICAL EXAMINATION
Gen: Well appearing in NAD  Head: NC/AT  Neck: trachea midline  Resp:  No distress, lungs cta  Cardiac: Heart RRR. No murmur.   Abdomen: Soft, nondistended.   Ext: Left leg shortening compared to right. Left hip tender to palpation.   Neuro:  A&O x1, appears nonfocal.   Skin:  Warm and dry as visualized

## 2022-02-01 NOTE — ED ADULT NURSE NOTE - CHIEF COMPLAINT QUOTE
BIBEMS from [facility] s/p unwitnessed fall with L hip pain, + deformity/shortening, on ASA. Patient baseline dementia alert to person only. Unknown LOC or if she hit her head. Patient very [place].

## 2022-02-01 NOTE — H&P ADULT - HISTORY OF PRESENT ILLNESS
89 yo F From [facility].  Found out of bed on floor next to bed ~ 4 hours ago and CO L hip pain.  Niece HCP at bedside also states she had been CO R lower rib pain.  When asked, pt denies pain to ribs, side, leg, hip or any CO. Niece does not notice other CO.  Very unreliable historian.  Pt unable to give any ROS due to chronic Dementia.  Niece states pt is A&Ox1 at baseline.

## 2022-02-01 NOTE — H&P ADULT - ATTENDING COMMENTS
Seen and examined in ER with ACP.   While demented, patient endorses pain and voluntarily points to left hip as source; indicating some reliable degree of awareness. This point allowed clearance of C spine despite degenerative changes on imaging; as patient was a relatively reliable participant.   Per niece, patient ambulates freely at facility, and is relatively engaged with daily activities; albeit with observable signs of dementia.     Exam:   LLE shortened and externally rotated. Endorses pain with attempted movement.   Palpable distal LLE pulses.     *Images independently reviewed.     *Patient with isolated hip fracture; no further management indicated from trauma surgery service.   --Rest of care per primary team.

## 2022-02-01 NOTE — H&P ADULT - ASSESSMENT
89 yo F With severe dementia limiting H&P fell out of bed at nursing home.  Noted to have L hip pain and BL Lower rib pain. Limited physical exam otherwise due to dementia.     Niece states is at baseline.    Head CT Neg  Pelvis CT Shows L IT fracture    I would place C-Collar, get C-spine CT Given age and limited exam.  Chest abd CT With IV  Contrast.    Ortho CS called by ED    Dispo depending on above imaging.

## 2022-02-01 NOTE — ED PROVIDER NOTE - CLINICAL SUMMARY MEDICAL DECISION MAKING FREE TEXT BOX
89 yo female s/p fall oob at [facility]. Will obtain appropriate labs and imaging, pain control. Monitor and reassess.

## 2022-02-01 NOTE — ED ADULT TRIAGE NOTE - PAIN: PRESENCE, MLM
Health Maintenance Due   Topic Date Due   • Influenza Vaccine (1) 08/01/2021       Patient is due for topics as listed above but is not proceeding with Immunization(s) Influenza at this time. Flu shot not offered- this vaccine is not yet available at this site.         complains of pain/discomfort

## 2022-02-01 NOTE — ED PROVIDER NOTE - ATTENDING CONTRIBUTION TO CARE
89 yo female hx dementia, HTN, HLD, Hypothyroidism, Osteoporosis presents s/p fall out of bed at [facility] at [facility] earlier today. Patient noted to have visible leg shortening on exam. Patient notes some left hip pain on exam. History limited secondary to patient's baseline dementia. Only on aspirin 81mg.   AP - shortening on left leg, inability to range. will get imaging eval for traumatic injury. pain control. reassess

## 2022-02-01 NOTE — CONSULT NOTE ADULT - ASSESSMENT
89 yr old female with dementia, hypothyroidism, TIA presented after a fall, imaging revealed an acute left intertrochanteric fracture, labs with WBC 18. EKG normal sinus rhythm. Chart from assisted living noted.    1. Acute comminuted intertrochanteric fracture sec to fall:  Evaluated by orthopedics and trauma team  Plan for OR today  fall precautions  pain control  Lovenox post procedure per Ortho  PT eval post procedure.  No medical contraindications for OR.    2. Hypothyroidism:  Continue Synthroid.    3. TIA:  On low dose aspirin and statin  Resume aspirin post procedure.    4. Dementia:  Supportive care  risk for delirium post op, monitor closely  fall risk    5. DVT prophylaxis:  Lovenox post procedure    Discussed with niece at bedside, patient is full code. 89 yr old female with dementia, hypothyroidism, TIA presented after a fall, imaging revealed an acute left intertrochanteric fracture, labs with WBC 18. EKG normal sinus rhythm. Chart from assisted living noted.    1. Acute comminuted intertrochanteric fracture sec to fall:  Evaluated by orthopedics and trauma team  Plan for OR today  fall precautions  pain control  Lovenox post procedure per Ortho  PT eval post procedure.  No medical contraindications for OR.    2. Hypothyroidism:  Continue Synthroid.    3. TIA:  On low dose aspirin and statin  Resume aspirin post procedure.    4. Dementia:  Supportive care  risk for delirium post op, monitor closely  fall risk    5. DVT prophylaxis:  Lovenox post procedure    6. Leucocytosis:  Trend WBC  If elevated will pursue further work up, UA etc  no fever, no localizing signs of infection.    Discussed with niece at bedside, patient is full code.

## 2022-02-02 LAB
ANION GAP SERPL CALC-SCNC: 13 MMOL/L — SIGNIFICANT CHANGE UP (ref 5–17)
BASOPHILS # BLD AUTO: 0.03 K/UL — SIGNIFICANT CHANGE UP (ref 0–0.2)
BASOPHILS NFR BLD AUTO: 0.3 % — SIGNIFICANT CHANGE UP (ref 0–2)
BUN SERPL-MCNC: 31.9 MG/DL — HIGH (ref 8–20)
CALCIUM SERPL-MCNC: 8.3 MG/DL — LOW (ref 8.6–10.2)
CHLORIDE SERPL-SCNC: 110 MMOL/L — HIGH (ref 98–107)
CO2 SERPL-SCNC: 20 MMOL/L — LOW (ref 22–29)
CREAT SERPL-MCNC: 1.08 MG/DL — SIGNIFICANT CHANGE UP (ref 0.5–1.3)
EOSINOPHIL # BLD AUTO: 0.01 K/UL — SIGNIFICANT CHANGE UP (ref 0–0.5)
EOSINOPHIL NFR BLD AUTO: 0.1 % — SIGNIFICANT CHANGE UP (ref 0–6)
GLUCOSE SERPL-MCNC: 127 MG/DL — HIGH (ref 70–99)
HCT VFR BLD CALC: 23.8 % — LOW (ref 34.5–45)
HGB BLD-MCNC: 7.4 G/DL — LOW (ref 11.5–15.5)
IMM GRANULOCYTES NFR BLD AUTO: 0.5 % — SIGNIFICANT CHANGE UP (ref 0–1.5)
LYMPHOCYTES # BLD AUTO: 0.88 K/UL — LOW (ref 1–3.3)
LYMPHOCYTES # BLD AUTO: 8.5 % — LOW (ref 13–44)
MCHC RBC-ENTMCNC: 30.6 PG — SIGNIFICANT CHANGE UP (ref 27–34)
MCHC RBC-ENTMCNC: 31.1 GM/DL — LOW (ref 32–36)
MCV RBC AUTO: 98.3 FL — SIGNIFICANT CHANGE UP (ref 80–100)
MONOCYTES # BLD AUTO: 1.13 K/UL — HIGH (ref 0–0.9)
MONOCYTES NFR BLD AUTO: 11 % — SIGNIFICANT CHANGE UP (ref 2–14)
NEUTROPHILS # BLD AUTO: 8.2 K/UL — HIGH (ref 1.8–7.4)
NEUTROPHILS NFR BLD AUTO: 79.6 % — HIGH (ref 43–77)
PLATELET # BLD AUTO: 213 K/UL — SIGNIFICANT CHANGE UP (ref 150–400)
POTASSIUM SERPL-MCNC: 5.1 MMOL/L — SIGNIFICANT CHANGE UP (ref 3.5–5.3)
POTASSIUM SERPL-SCNC: 5.1 MMOL/L — SIGNIFICANT CHANGE UP (ref 3.5–5.3)
RBC # BLD: 2.42 M/UL — LOW (ref 3.8–5.2)
RBC # FLD: 13.9 % — SIGNIFICANT CHANGE UP (ref 10.3–14.5)
SODIUM SERPL-SCNC: 143 MMOL/L — SIGNIFICANT CHANGE UP (ref 135–145)
WBC # BLD: 10.3 K/UL — SIGNIFICANT CHANGE UP (ref 3.8–10.5)
WBC # FLD AUTO: 10.3 K/UL — SIGNIFICANT CHANGE UP (ref 3.8–10.5)

## 2022-02-02 PROCEDURE — 99233 SBSQ HOSP IP/OBS HIGH 50: CPT

## 2022-02-02 RX ADMIN — OXYCODONE HYDROCHLORIDE 10 MILLIGRAM(S): 5 TABLET ORAL at 10:58

## 2022-02-02 RX ADMIN — OXYCODONE HYDROCHLORIDE 10 MILLIGRAM(S): 5 TABLET ORAL at 09:58

## 2022-02-02 RX ADMIN — Medication 25 MICROGRAM(S): at 05:51

## 2022-02-02 RX ADMIN — Medication 975 MILLIGRAM(S): at 23:04

## 2022-02-02 RX ADMIN — SENNA PLUS 2 TABLET(S): 8.6 TABLET ORAL at 22:34

## 2022-02-02 RX ADMIN — Medication 975 MILLIGRAM(S): at 05:20

## 2022-02-02 RX ADMIN — Medication 1 DROP(S): at 23:42

## 2022-02-02 RX ADMIN — Medication 975 MILLIGRAM(S): at 00:00

## 2022-02-02 RX ADMIN — Medication 1 DROP(S): at 18:25

## 2022-02-02 RX ADMIN — Medication 1 DROP(S): at 12:35

## 2022-02-02 RX ADMIN — Medication 1 DROP(S): at 05:31

## 2022-02-02 RX ADMIN — OXYCODONE HYDROCHLORIDE 5 MILLIGRAM(S): 5 TABLET ORAL at 01:23

## 2022-02-02 RX ADMIN — Medication 975 MILLIGRAM(S): at 10:00

## 2022-02-02 RX ADMIN — ATORVASTATIN CALCIUM 20 MILLIGRAM(S): 80 TABLET, FILM COATED ORAL at 22:34

## 2022-02-02 RX ADMIN — Medication 975 MILLIGRAM(S): at 06:32

## 2022-02-02 RX ADMIN — OXYCODONE HYDROCHLORIDE 5 MILLIGRAM(S): 5 TABLET ORAL at 02:20

## 2022-02-02 RX ADMIN — HYDROMORPHONE HYDROCHLORIDE 0.5 MILLIGRAM(S): 2 INJECTION INTRAMUSCULAR; INTRAVENOUS; SUBCUTANEOUS at 12:35

## 2022-02-02 RX ADMIN — HYDROMORPHONE HYDROCHLORIDE 0.5 MILLIGRAM(S): 2 INJECTION INTRAMUSCULAR; INTRAVENOUS; SUBCUTANEOUS at 13:02

## 2022-02-02 RX ADMIN — Medication 100 MILLIGRAM(S): at 05:16

## 2022-02-02 RX ADMIN — ENOXAPARIN SODIUM 40 MILLIGRAM(S): 100 INJECTION SUBCUTANEOUS at 10:01

## 2022-02-02 RX ADMIN — Medication 975 MILLIGRAM(S): at 22:34

## 2022-02-02 NOTE — PHYSICAL THERAPY INITIAL EVALUATION ADULT - ADDITIONAL COMMENTS
Pt is a NH resident. Nephew present at bedside, pt unable to provide reliable hx. Nephew reports pt required assistance but was ambulatory without a device.

## 2022-02-02 NOTE — PROGRESS NOTE ADULT - SUBJECTIVE AND OBJECTIVE BOX
INTERVAL HPI/OVERNIGHT EVENTS:    CC: s/p left hip fracture s/p IM nail, dementia, anemia, hypothyroid, TIA    No overnight events, pleasant but confused, nephew at bedside. Patient denies pain.     Vital Signs Last 24 Hrs  T(C): 36.4 (02 Feb 2022 04:00), Max: 37.2 (01 Feb 2022 13:56)  T(F): 97.5 (02 Feb 2022 04:00), Max: 99 (01 Feb 2022 13:56)  HR: 94 (02 Feb 2022 08:28) (70 - 94)  BP: 102/53 (02 Feb 2022 08:28) (81/56 - 146/77)  BP(mean): 74 (01 Feb 2022 19:00) (62 - 94)  RR: 19 (02 Feb 2022 08:28) (13 - 20)  SpO2: 92% (02 Feb 2022 08:28) (90% - 100%)    PHYSICAL EXAM:    GENERAL: alert, oriented x 1, not in distress  CHEST/LUNG: b/l air entry  HEART: reg  ABDOMEN: soft, bs+  EXTREMITIES:  no edema, tenderness    MEDICATIONS  (STANDING):  acetaminophen     Tablet .. 975 milliGRAM(s) Oral every 8 hours  amLODIPine   Tablet 2.5 milliGRAM(s) Oral daily  artificial  tears Solution 1 Drop(s) Right EYE every 6 hours  atorvastatin 20 milliGRAM(s) Oral at bedtime  enoxaparin Injectable 40 milliGRAM(s) SubCutaneous daily  levothyroxine 25 MICROGram(s) Oral daily  senna 2 Tablet(s) Oral at bedtime  sodium chloride 0.9%. 1000 milliLiter(s) (75 mL/Hr) IV Continuous <Continuous>  tranexamic acid IVPB 1000 milliGRAM(s) IV Intermittent once    MEDICATIONS  (PRN):  HYDROmorphone  Injectable 0.5 milliGRAM(s) IV Push every 4 hours PRN breakthrough pain  morphine  - Injectable 4 milliGRAM(s) IV Push every 3 hours PRN Severe Pain (7 - 10)  oxyCODONE    IR 5 milliGRAM(s) Oral every 3 hours PRN Mild Pain (1 - 3)  oxyCODONE    IR 10 milliGRAM(s) Oral every 3 hours PRN Moderate Pain (4 - 6)      Allergies    No Known Allergies    Intolerances          LABS:                          7.4    10.30 )-----------( 213      ( 02 Feb 2022 06:39 )             23.8     02-02    143  |  110<H>  |  31.9<H>  ----------------------------<  127<H>  5.1   |  20.0<L>  |  1.08    Ca    8.3<L>      02 Feb 2022 06:39    TPro  6.3<L>  /  Alb  4.0  /  TBili  0.2<L>  /  DBili  x   /  AST  17  /  ALT  10  /  AlkPhos  54  02-01    PT/INR - ( 01 Feb 2022 08:31 )   PT: 12.1 sec;   INR: 1.05 ratio         PTT - ( 01 Feb 2022 08:31 )  PTT:23.9 sec      RADIOLOGY & ADDITIONAL TESTS:

## 2022-02-02 NOTE — PROGRESS NOTE ADULT - SUBJECTIVE AND OBJECTIVE BOX
no acute events overnight. s/p left IMN with ortho    pain controlled.       MEDICATIONS  (STANDING):  acetaminophen     Tablet .. 975 milliGRAM(s) Oral every 8 hours  amLODIPine   Tablet 2.5 milliGRAM(s) Oral daily  artificial  tears Solution 1 Drop(s) Right EYE every 6 hours  atorvastatin 20 milliGRAM(s) Oral at bedtime  enoxaparin Injectable 40 milliGRAM(s) SubCutaneous daily  levothyroxine 25 MICROGram(s) Oral daily  senna 2 Tablet(s) Oral at bedtime  sodium chloride 0.9%. 1000 milliLiter(s) (75 mL/Hr) IV Continuous <Continuous>  tranexamic acid IVPB 1000 milliGRAM(s) IV Intermittent once    MEDICATIONS  (PRN):  HYDROmorphone  Injectable 0.5 milliGRAM(s) IV Push every 4 hours PRN breakthrough pain  morphine  - Injectable 4 milliGRAM(s) IV Push every 3 hours PRN Severe Pain (7 - 10)  oxyCODONE    IR 5 milliGRAM(s) Oral every 3 hours PRN Mild Pain (1 - 3)  oxyCODONE    IR 10 milliGRAM(s) Oral every 3 hours PRN Moderate Pain (4 - 6)      Vital Signs Last 24 Hrs  T(C): 36.4 (02 Feb 2022 04:00), Max: 37.2 (01 Feb 2022 13:56)  T(F): 97.5 (02 Feb 2022 04:00), Max: 99 (01 Feb 2022 13:56)  HR: 85 (02 Feb 2022 04:00) (70 - 90)  BP: 99/53 (02 Feb 2022 04:00) (81/56 - 146/77)  BP(mean): 74 (01 Feb 2022 19:00) (62 - 94)  RR: 19 (02 Feb 2022 04:00) (13 - 20)  SpO2: 90% (02 Feb 2022 04:00) (90% - 100%)    Physical Exam:    general: no acute distress    Respiratory: Breath Sounds equal & clear to auscultation, no accessory muscle use    Cardiovascular: Regular rate & rhythm, normal S1, S2; no murmurs, gallops or rubs    Gastrointestinal: Soft, non-tender, normal bowel sounds    Extremities: left lateral thigh incision c/d/i. minimal swelling. motor/sensation intact     Vascular: Equal and normal pulses: 2+ peripheral pulses throughout        I&O's Detail    01 Feb 2022 07:01  -  02 Feb 2022 07:00  --------------------------------------------------------  IN:    sodium chloride 0.9%: 225 mL  Total IN: 225 mL    OUT:    Voided (mL): 50 mL  Total OUT: 50 mL    Total NET: 175 mL          LABS:                        7.4    10.30 )-----------( 213      ( 02 Feb 2022 06:39 )             23.8     02-02    143  |  110<H>  |  31.9<H>  ----------------------------<  127<H>  5.1   |  20.0<L>  |  1.08    Ca    8.3<L>      02 Feb 2022 06:39    TPro  6.3<L>  /  Alb  4.0  /  TBili  0.2<L>  /  DBili  x   /  AST  17  /  ALT  10  /  AlkPhos  54  02-01    PT/INR - ( 01 Feb 2022 08:31 )   PT: 12.1 sec;   INR: 1.05 ratio         PTT - ( 01 Feb 2022 08:31 )  PTT:23.9 sec      RADIOLOGY & ADDITIONAL STUDIES:

## 2022-02-02 NOTE — PROGRESS NOTE ADULT - ASSESSMENT
89 year old female found down with left intertrochanteric femur fracture s/p left IMN pod 1    -wbat lle  -pain control  -rest of care as per primary team  -no further trauma workup needed

## 2022-02-02 NOTE — PATIENT PROFILE ADULT - FALL HARM RISK - HARM RISK INTERVENTIONS
Assistance with ambulation/Assistance OOB with selected safe patient handling equipment/Communicate Risk of Fall with Harm to all staff/Discuss with provider need for PT consult/Monitor for mental status changes/Monitor gait and stability/Move patient closer to nurses' station/Reinforce activity limits and safety measures with patient and family/Reorient to person, place and time as needed/Sit up slowly, dangle for a short time, stand at bedside before walking/Tailored Fall Risk Interventions/Toileting schedule using arm’s reach rule for commode and bathroom/Use of alarms - bed, chair and/or voice tab/Visual Cue: Yellow wristband and red socks/Bed in lowest position, wheels locked, appropriate side rails in place/Call bell, personal items and telephone in reach/Instruct patient to call for assistance before getting out of bed or chair/Non-slip footwear when patient is out of bed/[place] to call system/Physically safe environment - no spills, clutter or unnecessary equipment/Purposeful Proactive Rounding/Room/bathroom lighting operational, light cord in reach

## 2022-02-02 NOTE — PROGRESS NOTE ADULT - ASSESSMENT
89 yr old female with dementia, hypothyroidism, TIA presented after a fall, imaging revealed an acute left intertrochanteric fracture, labs with WBC 18. EKG normal sinus rhythm. Chart from assisted living noted.    1. Acute comminuted intertrochanteric fracture sec to fall s/p IM Nail:  PT evaluation  Pain control  Incentive spirometry.  Fall risk  DVT prophylaxis.    2. Hypothyroidism:  Continue Synthroid.    3. TIA:  On low dose aspirin and statin  continue aspirin    4. Dementia:  Supportive care  risk for delirium post op, monitor closely  fall risk    5. DVT prophylaxis:  Lovenox post procedure    6. Leucocytosis:  improved  monitor for temp and trend WBC.    7. Post op anemia:  Transfuse PRBC today  Monitor CBC.    Discussed with mckenna Hercules at bedside and brittni Beltrán over the phone.  Discussed with RN.

## 2022-02-03 LAB
ANION GAP SERPL CALC-SCNC: 12 MMOL/L — SIGNIFICANT CHANGE UP (ref 5–17)
BASOPHILS # BLD AUTO: 0.02 K/UL — SIGNIFICANT CHANGE UP (ref 0–0.2)
BASOPHILS NFR BLD AUTO: 0.3 % — SIGNIFICANT CHANGE UP (ref 0–2)
BUN SERPL-MCNC: 27 MG/DL — HIGH (ref 8–20)
CALCIUM SERPL-MCNC: 7.8 MG/DL — LOW (ref 8.6–10.2)
CHLORIDE SERPL-SCNC: 110 MMOL/L — HIGH (ref 98–107)
CO2 SERPL-SCNC: 18 MMOL/L — LOW (ref 22–29)
CREAT SERPL-MCNC: 0.9 MG/DL — SIGNIFICANT CHANGE UP (ref 0.5–1.3)
EOSINOPHIL # BLD AUTO: 0.17 K/UL — SIGNIFICANT CHANGE UP (ref 0–0.5)
EOSINOPHIL NFR BLD AUTO: 2.5 % — SIGNIFICANT CHANGE UP (ref 0–6)
GLUCOSE SERPL-MCNC: 115 MG/DL — HIGH (ref 70–99)
HCT VFR BLD CALC: 23 % — LOW (ref 34.5–45)
HGB BLD-MCNC: 7.4 G/DL — LOW (ref 11.5–15.5)
IMM GRANULOCYTES NFR BLD AUTO: 0.6 % — SIGNIFICANT CHANGE UP (ref 0–1.5)
LYMPHOCYTES # BLD AUTO: 0.77 K/UL — LOW (ref 1–3.3)
LYMPHOCYTES # BLD AUTO: 11.1 % — LOW (ref 13–44)
MCHC RBC-ENTMCNC: 30.3 PG — SIGNIFICANT CHANGE UP (ref 27–34)
MCHC RBC-ENTMCNC: 32.2 GM/DL — SIGNIFICANT CHANGE UP (ref 32–36)
MCV RBC AUTO: 94.3 FL — SIGNIFICANT CHANGE UP (ref 80–100)
MONOCYTES # BLD AUTO: 1.04 K/UL — HIGH (ref 0–0.9)
MONOCYTES NFR BLD AUTO: 15 % — HIGH (ref 2–14)
NEUTROPHILS # BLD AUTO: 4.88 K/UL — SIGNIFICANT CHANGE UP (ref 1.8–7.4)
NEUTROPHILS NFR BLD AUTO: 70.5 % — SIGNIFICANT CHANGE UP (ref 43–77)
PLATELET # BLD AUTO: 175 K/UL — SIGNIFICANT CHANGE UP (ref 150–400)
POTASSIUM SERPL-MCNC: 4.3 MMOL/L — SIGNIFICANT CHANGE UP (ref 3.5–5.3)
POTASSIUM SERPL-SCNC: 4.3 MMOL/L — SIGNIFICANT CHANGE UP (ref 3.5–5.3)
RBC # BLD: 2.44 M/UL — LOW (ref 3.8–5.2)
RBC # FLD: 15.7 % — HIGH (ref 10.3–14.5)
SODIUM SERPL-SCNC: 140 MMOL/L — SIGNIFICANT CHANGE UP (ref 135–145)
WBC # BLD: 6.92 K/UL — SIGNIFICANT CHANGE UP (ref 3.8–10.5)
WBC # FLD AUTO: 6.92 K/UL — SIGNIFICANT CHANGE UP (ref 3.8–10.5)

## 2022-02-03 PROCEDURE — 74176 CT ABD & PELVIS W/O CONTRAST: CPT | Mod: 26

## 2022-02-03 PROCEDURE — 99233 SBSQ HOSP IP/OBS HIGH 50: CPT

## 2022-02-03 PROCEDURE — 72192 CT PELVIS W/O DYE: CPT | Mod: 26,59

## 2022-02-03 RX ORDER — SODIUM CHLORIDE 9 MG/ML
1000 INJECTION INTRAMUSCULAR; INTRAVENOUS; SUBCUTANEOUS
Refills: 0 | Status: DISCONTINUED | OUTPATIENT
Start: 2022-02-03 | End: 2022-02-04

## 2022-02-03 RX ORDER — MORPHINE SULFATE 50 MG/1
4 CAPSULE, EXTENDED RELEASE ORAL ONCE
Refills: 0 | Status: DISCONTINUED | OUTPATIENT
Start: 2022-02-03 | End: 2022-02-03

## 2022-02-03 RX ORDER — QUETIAPINE FUMARATE 200 MG/1
12.5 TABLET, FILM COATED ORAL AT BEDTIME
Refills: 0 | Status: DISCONTINUED | OUTPATIENT
Start: 2022-02-03 | End: 2022-02-05

## 2022-02-03 RX ORDER — ASPIRIN/CALCIUM CARB/MAGNESIUM 324 MG
81 TABLET ORAL DAILY
Refills: 0 | Status: ACTIVE | OUTPATIENT
Start: 2022-02-03 | End: 2023-01-02

## 2022-02-03 RX ADMIN — QUETIAPINE FUMARATE 12.5 MILLIGRAM(S): 200 TABLET, FILM COATED ORAL at 21:24

## 2022-02-03 RX ADMIN — AMLODIPINE BESYLATE 2.5 MILLIGRAM(S): 2.5 TABLET ORAL at 05:40

## 2022-02-03 RX ADMIN — ATORVASTATIN CALCIUM 20 MILLIGRAM(S): 80 TABLET, FILM COATED ORAL at 21:24

## 2022-02-03 RX ADMIN — ENOXAPARIN SODIUM 40 MILLIGRAM(S): 100 INJECTION SUBCUTANEOUS at 11:39

## 2022-02-03 RX ADMIN — SENNA PLUS 2 TABLET(S): 8.6 TABLET ORAL at 21:24

## 2022-02-03 RX ADMIN — Medication 975 MILLIGRAM(S): at 14:13

## 2022-02-03 RX ADMIN — Medication 975 MILLIGRAM(S): at 21:24

## 2022-02-03 RX ADMIN — Medication 25 MICROGRAM(S): at 05:40

## 2022-02-03 RX ADMIN — Medication 975 MILLIGRAM(S): at 13:00

## 2022-02-03 RX ADMIN — MORPHINE SULFATE 4 MILLIGRAM(S): 50 CAPSULE, EXTENDED RELEASE ORAL at 22:37

## 2022-02-03 RX ADMIN — MORPHINE SULFATE 4 MILLIGRAM(S): 50 CAPSULE, EXTENDED RELEASE ORAL at 23:00

## 2022-02-03 RX ADMIN — Medication 0.5 MILLIGRAM(S): at 05:40

## 2022-02-03 RX ADMIN — Medication 975 MILLIGRAM(S): at 05:40

## 2022-02-03 RX ADMIN — Medication 81 MILLIGRAM(S): at 11:39

## 2022-02-03 RX ADMIN — Medication 1 DROP(S): at 05:41

## 2022-02-03 RX ADMIN — Medication 1 DROP(S): at 21:26

## 2022-02-03 RX ADMIN — Medication 975 MILLIGRAM(S): at 21:31

## 2022-02-03 RX ADMIN — Medication 975 MILLIGRAM(S): at 06:10

## 2022-02-03 RX ADMIN — SODIUM CHLORIDE 100 MILLILITER(S): 9 INJECTION INTRAMUSCULAR; INTRAVENOUS; SUBCUTANEOUS at 17:15

## 2022-02-03 NOTE — PROGRESS NOTE ADULT - SUBJECTIVE AND OBJECTIVE BOX
ORTHO-TRAUMA SERVICE      Pt Name: DANISH MICHAELS    MRN: [id number]      Patient is a 89 y.o female being followed for Left hip intramedullary nail fixation POD 2. Patient seen and examined bedside. Patient is comfortable and pain is controlled. Patient is a poor historian but denies pain, numbness, tingling, or acute sensory motor changes. Nurse assistant in the room states patient pulled Brenner overnight.       PAST MEDICAL & SURGICAL HISTORY:  PAST MEDICAL & SURGICAL HISTORY:  HTN (hypertension)    Dementia    Osteoporosis    Arthritis    Hypothyroidism    Degeneration macular    Poor historian    [place] (hard of hearing)        Allergies: No Known Allergies      Medications: acetaminophen     Tablet .. 975 milliGRAM(s) Oral every 8 hours  amLODIPine   Tablet 2.5 milliGRAM(s) Oral daily  artificial  tears Solution 1 Drop(s) Right EYE every 6 hours  atorvastatin 20 milliGRAM(s) Oral at bedtime  enoxaparin Injectable 40 milliGRAM(s) SubCutaneous daily  HYDROmorphone  Injectable 0.5 milliGRAM(s) IV Push every 4 hours PRN  levothyroxine 25 MICROGram(s) Oral daily  morphine  - Injectable 4 milliGRAM(s) IV Push every 3 hours PRN  oxyCODONE    IR 5 milliGRAM(s) Oral every 3 hours PRN  oxyCODONE    IR 10 milliGRAM(s) Oral every 3 hours PRN  senna 2 Tablet(s) Oral at bedtime  sodium chloride 0.9%. 1000 milliLiter(s) IV Continuous <Continuous>  tranexamic acid IVPB 1000 milliGRAM(s) IV Intermittent once                          7.4    6.92  )-----------( 175      ( 03 Feb 2022 06:32 )             23.0     02-03    140  |  110<H>  |  27.0<H>  ----------------------------<  115<H>  4.3   |  18.0<L>  |  0.90    Ca    7.8<L>      03 Feb 2022 06:32        PHYSICAL EXAM:    Vital Signs Last 24 Hrs  T(C): 36.8 (03 Feb 2022 04:45), Max: 36.8 (03 Feb 2022 04:45)  T(F): 98.2 (03 Feb 2022 04:45), Max: 98.2 (03 Feb 2022 04:45)  HR: 102 (03 Feb 2022 04:45) (93 - 112)  BP: 135/74 (03 Feb 2022 04:45) (122/68 - 138/71)  BP(mean): --  RR: 18 (03 Feb 2022 04:45) (18 - 19)  SpO2: 92% (03 Feb 2022 04:45) (92% - 94%)  Daily     Daily     Appearance: Alert, responsive, in no acute distress.    Neurological: Sensation is grossly intact to light touch. No focal deficits or weaknesses found.    Skin: no rash on visible skin. Skin is clean, dry and intact. No bleeding. No abrasions. No ulcerations.    Vascular: 2+ distal pulses. Cap refill < 2 sec. No signs of venous insufficiency or stasis. No extremity ulcerations. No cyanosis.    Musculoskeletal:         Left Lower Extremity: + EHL FHL, spontaneous pro plantar flexion, physical exam limited due to patients mental status.  Mepilex in place, C/D/I, ecchymosis near incision site. No bleeding, staining. erythema.      A/P:  Pt is a  89y Female s/p LEFT Hip intramedullary nail fixation POD 2.    PLAN:   * Pain control  * PT  * DVTp: Lov 40qd  * WBAT with walker assistance  * discharge pending pt and medical clearance    ORTHO-TRAUMA SERVICE      Pt Name: DANISH MICHAELS    MRN: [id number]      Patient is a 89 y.o female being followed for Left hip intramedullary nail fixation POD 2. Patient seen and examined bedside. Patient is comfortable and pain is controlled. Patient is a poor historian but denies pain, numbness, tingling, or acute sensory motor changes. Nurse assistant in the room states patient pulled Brenner overnight.       PAST MEDICAL & SURGICAL HISTORY:  PAST MEDICAL & SURGICAL HISTORY:  HTN (hypertension)    Dementia    Osteoporosis    Arthritis    Hypothyroidism    Degeneration macular    Poor historian    [place] (hard of hearing)        Allergies: No Known Allergies      Medications: acetaminophen     Tablet .. 975 milliGRAM(s) Oral every 8 hours  amLODIPine   Tablet 2.5 milliGRAM(s) Oral daily  artificial  tears Solution 1 Drop(s) Right EYE every 6 hours  atorvastatin 20 milliGRAM(s) Oral at bedtime  enoxaparin Injectable 40 milliGRAM(s) SubCutaneous daily  HYDROmorphone  Injectable 0.5 milliGRAM(s) IV Push every 4 hours PRN  levothyroxine 25 MICROGram(s) Oral daily  morphine  - Injectable 4 milliGRAM(s) IV Push every 3 hours PRN  oxyCODONE    IR 5 milliGRAM(s) Oral every 3 hours PRN  oxyCODONE    IR 10 milliGRAM(s) Oral every 3 hours PRN  senna 2 Tablet(s) Oral at bedtime  sodium chloride 0.9%. 1000 milliLiter(s) IV Continuous <Continuous>  tranexamic acid IVPB 1000 milliGRAM(s) IV Intermittent once                          7.4    6.92  )-----------( 175      ( 03 Feb 2022 06:32 )             23.0     02-03    140  |  110<H>  |  27.0<H>  ----------------------------<  115<H>  4.3   |  18.0<L>  |  0.90    Ca    7.8<L>      03 Feb 2022 06:32        PHYSICAL EXAM:    Vital Signs Last 24 Hrs  T(C): 36.8 (03 Feb 2022 04:45), Max: 36.8 (03 Feb 2022 04:45)  T(F): 98.2 (03 Feb 2022 04:45), Max: 98.2 (03 Feb 2022 04:45)  HR: 102 (03 Feb 2022 04:45) (93 - 112)  BP: 135/74 (03 Feb 2022 04:45) (122/68 - 138/71)  BP(mean): --  RR: 18 (03 Feb 2022 04:45) (18 - 19)  SpO2: 92% (03 Feb 2022 04:45) (92% - 94%)  Daily     Daily     Appearance: Alert, responsive, in no acute distress.    Neurological: Sensation is grossly intact to light touch. No focal deficits or weaknesses found.    Skin: no rash on visible skin. Skin is clean, dry and intact. No bleeding. No abrasions. No ulcerations.    Vascular: 2+ distal pulses. Cap refill < 2 sec. No signs of venous insufficiency or stasis. No extremity ulcerations. No cyanosis.    Musculoskeletal:         Left Lower Extremity: + EHL FHL, spontaneous pro plantar flexion, physical exam limited due to patients mental status.  Mepilex in place, C/D/I, ecchymosis near incision site. No bleeding, staining. erythema.      A/P:  Pt is a  89y Female s/p LEFT Hip intramedullary nail fixation POD 2.    PLAN:   * Pain control  * PT  * DVTp: Lov 40qd  * WBAT with walker assistance  * Monitor Hg as per primary team  * discharge pending pt and medical clearance

## 2022-02-03 NOTE — CDI QUERY NOTE - NSCDIOTHERTXTBX2_GEN_ALL_CORE_FT
Patient is documented with post op anemia.     Documentation:  2/2 Hospitalist-   7. Post op anemia:  Transfuse PRBC today  Monitor CBC.    Hemoglobin levels:  Hemoglobin: 7.4 g/dL *L* [11.5 - 15.5] (02-03-22)  Hemoglobin: 7.4 g/dL *L* [11.5 - 15.5] (02-02-22)  Hemoglobin: 10.0 g/dL *L* [11.5 - 15.5] (02-01-22)      Blood Products:  Packed Red Cells Order:  1 Unit   Indication: Hgb <8 gm/dL -Postoperative Orthopedic or Cardiac Surge  Infuse Unit : 3 Hours (02-03-22) (not performed)  Packed Red Cells Order:  1 Unit   Indication: Hgb <8 gm/dL -Postoperative Orthopedic or Cardiac Surge  Infuse Unit : 3 Hours (02-02-22) (not performed)      Please further specify type of anemia.    1.) Postoperative anemia due to blood loss  2.) Other (please specify)  3.) Not clinically significant

## 2022-02-03 NOTE — CDI QUERY NOTE - NSCDIOTHERTXTBX_GEN_ALL_CORE_HH
ED- 89 yo female hx dementia, HTN, Hypothyroidism, Osteoporosis, Arthritis presents s/p fall out of bed  Intertrochanteric fracture of left hip.    H&P- PAST MEDICAL HISTORY: Osteoporosis     Ortho- [facility]- Osteoporosis  Pt is a 89y Female with found to have Left IT fx     Please specify if there is a link between the osteoporosis and the hip fracture    1) Left intertrochanteric hip fracture secondary to osteoporosis and trauma  2) No relationship between the left intertrochanteric hip fracture and the osteoporosis  3) Other ( please specify)  4) Not clinically significant

## 2022-02-03 NOTE — PROGRESS NOTE ADULT - SUBJECTIVE AND OBJECTIVE BOX
INTERVAL HPI/OVERNIGHT EVENTS:    CC: post op anemia, s/p im nail of left hip fracture, dementia with delirium, TIA, urinary retention    Pulled out Brenner which was placed for urinary retention last night. CO at bedside  Denies pain.    Vital Signs Last 24 Hrs  T(C): 37 (03 Feb 2022 12:54), Max: 37 (03 Feb 2022 12:54)  T(F): 98.6 (03 Feb 2022 12:54), Max: 98.6 (03 Feb 2022 12:54)  HR: 85 (03 Feb 2022 12:54) (85 - 112)  BP: 133/68 (03 Feb 2022 12:54) (118/60 - 138/71)  BP(mean): --  RR: 20 (03 Feb 2022 12:54) (18 - 20)  SpO2: 92% (03 Feb 2022 12:54) (92% - 94%)    PHYSICAL EXAM:    GENERAL: alert, oriented x 1  CHEST/LUNG: b/l air entry  HEART: reg  ABDOMEN: soft, non tender  EXTREMITIES:  no edema, tenderness    MEDICATIONS  (STANDING):  acetaminophen     Tablet .. 975 milliGRAM(s) Oral every 8 hours  amLODIPine   Tablet 2.5 milliGRAM(s) Oral daily  artificial  tears Solution 1 Drop(s) Right EYE every 6 hours  aspirin  chewable 81 milliGRAM(s) Oral daily  atorvastatin 20 milliGRAM(s) Oral at bedtime  enoxaparin Injectable 40 milliGRAM(s) SubCutaneous daily  levothyroxine 25 MICROGram(s) Oral daily  senna 2 Tablet(s) Oral at bedtime  sodium chloride 0.9%. 1000 milliLiter(s) (100 mL/Hr) IV Continuous <Continuous>  tranexamic acid IVPB 1000 milliGRAM(s) IV Intermittent once    MEDICATIONS  (PRN):  HYDROmorphone  Injectable 0.5 milliGRAM(s) IV Push every 4 hours PRN breakthrough pain  morphine  - Injectable 4 milliGRAM(s) IV Push every 3 hours PRN Severe Pain (7 - 10)  oxyCODONE    IR 5 milliGRAM(s) Oral every 3 hours PRN Mild Pain (1 - 3)  oxyCODONE    IR 10 milliGRAM(s) Oral every 3 hours PRN Moderate Pain (4 - 6)      Allergies    No Known Allergies    Intolerances          LABS:                          7.4    6.92  )-----------( 175      ( 03 Feb 2022 06:32 )             23.0     02-03    140  |  110<H>  |  27.0<H>  ----------------------------<  115<H>  4.3   |  18.0<L>  |  0.90    Ca    7.8<L>      03 Feb 2022 06:32            RADIOLOGY & ADDITIONAL TESTS:

## 2022-02-03 NOTE — PROGRESS NOTE ADULT - ASSESSMENT
89 yr old female with dementia, hypothyroidism, TIA presented after a fall, imaging revealed an acute left intertrochanteric fracture, labs with WBC 18 on admission. She is s/p IM nail of left hip. Post operative course was complicated by anemia, Hb 7.4. PRBC transfusion was given. Also developed urinary retention requiring Brenner placement. She developed agitation and pulled out Brenner, required constant observation. Her Hb did not improve, was 7.4 on 2/3, hence additional PRBC ordered and CT abdomen/pelvis was ordered.     1. Acute comminuted intertrochanteric fracture sec to fall s/p IM Nail:  PT evaluation noted, needs AARON.  Pain control  Incentive spirometry.    2. Hypothyroidism:  Continue Synthroid.    3. TIA:  On low dose aspirin and statin  continue aspirin    4. Dementia now with delirium:  Supportive care  Constant observation  Start low dose Seroquel.  Fall precautions.    5. DVT prophylaxis:  Lovenox     6. Leucocytosis resolved:  Continue to monitor.    7. Post op anemia:  Hb did not improve post transfusion yesterday  PRBC today  Check CT abdo/pelvis and hip, no hematoma noted on exam.    8. Urinary retention:  Pulled out Brenner  UO has been low since she pulled out Brenner  start IVF  CT abdo      Discussed with RN.  Will consult palliative care. GOC was discussed with niece on admission, patient is full code. 89 yr old female with dementia, hypothyroidism, TIA presented after a fall, imaging revealed an acute left intertrochanteric fracture, labs with WBC 18 on admission. She is s/p IM nail of left hip. Post operative course was complicated by anemia, Hb 7.4. PRBC transfusion was given. Also developed urinary retention requiring Brenner placement. She developed agitation and pulled out Brenner, required constant observation. Her Hb did not improve, was 7.4 on 2/3, hence additional PRBC ordered and CT abdomen/pelvis was ordered.     1. Acute comminuted intertrochanteric fracture sec to fall s/p IM Nail:  PT evaluation noted, needs AARON.  Pain control  Incentive spirometry.    2. Hypothyroidism:  Continue Synthroid.    3. TIA:  On low dose aspirin and statin  continue aspirin    4. Dementia now with delirium:  Supportive care  Constant observation  Start low dose Seroquel.  Fall precautions.    5. DVT prophylaxis:  Lovenox     6. Leucocytosis resolved:  Continue to monitor.    7. Postoperative anemia due to blood loss  Hb did not improve post transfusion yesterday  PRBC today  Check CT abdo/pelvis and hip, no hematoma noted on exam.    8. Urinary retention:  Pulled out Brenner  UO has been low since she pulled out Brenner  start IVF  CT abdo      Discussed with RN.  Will consult palliative care. GOC was discussed with niece on admission, patient is full code. 89 yr old female with dementia, hypothyroidism, TIA presented after a fall, imaging revealed an acute left intertrochanteric fracture, labs with WBC 18 on admission. She is s/p IM nail of left hip. Post operative course was complicated by anemia, Hb 7.4. PRBC transfusion was given. Also developed urinary retention requiring Brenner placement. She developed agitation and pulled out Brenner, required constant observation. Her Hb did not improve, was 7.4 on 2/3, hence additional PRBC ordered and CT abdomen/pelvis was ordered.     1. Acute comminuted intertrochanteric fracture sec to fall s/p IM Nail:  PT evaluation noted, needs AARON.  Pain control  Incentive spirometry.    2. Hypothyroidism:  Continue Synthroid.    3. TIA:  On low dose aspirin and statin  continue aspirin    4. Dementia now with delirium:  Supportive care  Constant observation  Start low dose Seroquel.  Fall precautions.    5. DVT prophylaxis:  Lovenox     6. Leucocytosis resolved:  Continue to monitor.    7. Postoperative anemia due to blood loss  Hb did not improve post transfusion yesterday  PRBC today  Check CT abdo/pelvis and hip, no hematoma noted on exam.    8. Urinary retention:  Pulled out Brenner  UO has been low since she pulled out Brenner  start IVF  CT abdo      Discussed with RN.  Will consult palliative care. GOC was discussed with arabella on admission, patient is full code.  updated arabella Mendosa. 89 yr old female with dementia, hypothyroidism, TIA presented after a fall, imaging revealed an acute left intertrochanteric fracture, labs with WBC 18 on admission. She is s/p IM nail of left hip. Post operative course was complicated by anemia, Hb 7.4. PRBC transfusion was given. Also developed urinary retention requiring Brenner placement. She developed agitation and pulled out Brenner, required constant observation. Her Hb did not improve, was 7.4 on 2/3, hence additional PRBC ordered and CT abdomen/pelvis was ordered.     1. Acute comminuted intertrochanteric fracture sec to fall s/p IM Nail:  PT evaluation noted, needs AARON.  Pain control  Incentive spirometry.    2. Hypothyroidism:  Continue Synthroid.    3. TIA:  On low dose aspirin and statin  continue aspirin    4. Dementia now with delirium:  Supportive care  Constant observation  Start low dose Seroquel.  Fall precautions.    5. DVT prophylaxis:  Lovenox     6. Leucocytosis resolved:  Continue to monitor.    7. Postoperative anemia due to blood loss  Hb did not improve post transfusion yesterday  PRBC today  Check CT abdo/pelvis and hip, no hematoma noted on exam.    8. Urinary retention:  Pulled out Brenner  UO has been low since she pulled out Brenner  start IVF  CT abdo      Discussed with RN.  Will consult palliative care. GOC was discussed with arabella on admission, patient is full code.  updated arabella Mendosa, cell: [phone], to be used for updates  mckenna Hercules: [phone].

## 2022-02-03 NOTE — CHART NOTE - NSCHARTNOTEFT_GEN_A_CORE
Medicine PA-  Cd for pt that pulled out her stover (balloon intact/ no bleeding) pt is extremely agitated and yelling, hx dementia and on 1:1. Ordered ativan 0.5mg IVP and will allow pt to relax before attempting to reinsert stover, continue to monitor. Medicine PA-  Cd for pt that pulled out her stover (balloon intact/ no bleeding) pt is extremely agitated and yelling, hx dementia and on 1:1. Ordered ativan 0.5mg IVP and will allow pt to relax before attempting to reinsert stover, continue to monitor.    Addendum: Pt is resting comfortably, VSS, 94% RA, will have day team f/u on possibly reinserting stover cath vs TOV.

## 2022-02-04 LAB
ANION GAP SERPL CALC-SCNC: 12 MMOL/L — SIGNIFICANT CHANGE UP (ref 5–17)
BASOPHILS # BLD AUTO: 0.02 K/UL — SIGNIFICANT CHANGE UP (ref 0–0.2)
BASOPHILS NFR BLD AUTO: 0.3 % — SIGNIFICANT CHANGE UP (ref 0–2)
BUN SERPL-MCNC: 18.9 MG/DL — SIGNIFICANT CHANGE UP (ref 8–20)
CALCIUM SERPL-MCNC: 7.9 MG/DL — LOW (ref 8.6–10.2)
CHLORIDE SERPL-SCNC: 112 MMOL/L — HIGH (ref 98–107)
CO2 SERPL-SCNC: 18 MMOL/L — LOW (ref 22–29)
CREAT SERPL-MCNC: 0.64 MG/DL — SIGNIFICANT CHANGE UP (ref 0.5–1.3)
EOSINOPHIL # BLD AUTO: 0.45 K/UL — SIGNIFICANT CHANGE UP (ref 0–0.5)
EOSINOPHIL NFR BLD AUTO: 6.6 % — HIGH (ref 0–6)
GLUCOSE SERPL-MCNC: 96 MG/DL — SIGNIFICANT CHANGE UP (ref 70–99)
HCT VFR BLD CALC: 27.6 % — LOW (ref 34.5–45)
HGB BLD-MCNC: 9 G/DL — LOW (ref 11.5–15.5)
IMM GRANULOCYTES NFR BLD AUTO: 0.9 % — SIGNIFICANT CHANGE UP (ref 0–1.5)
LYMPHOCYTES # BLD AUTO: 0.89 K/UL — LOW (ref 1–3.3)
LYMPHOCYTES # BLD AUTO: 13 % — SIGNIFICANT CHANGE UP (ref 13–44)
MAGNESIUM SERPL-MCNC: 2.1 MG/DL — SIGNIFICANT CHANGE UP (ref 1.6–2.6)
MCHC RBC-ENTMCNC: 29.4 PG — SIGNIFICANT CHANGE UP (ref 27–34)
MCHC RBC-ENTMCNC: 32.6 GM/DL — SIGNIFICANT CHANGE UP (ref 32–36)
MCV RBC AUTO: 90.2 FL — SIGNIFICANT CHANGE UP (ref 80–100)
MONOCYTES # BLD AUTO: 0.77 K/UL — SIGNIFICANT CHANGE UP (ref 0–0.9)
MONOCYTES NFR BLD AUTO: 11.2 % — SIGNIFICANT CHANGE UP (ref 2–14)
NEUTROPHILS # BLD AUTO: 4.68 K/UL — SIGNIFICANT CHANGE UP (ref 1.8–7.4)
NEUTROPHILS NFR BLD AUTO: 68 % — SIGNIFICANT CHANGE UP (ref 43–77)
PHOSPHATE SERPL-MCNC: 2.5 MG/DL — SIGNIFICANT CHANGE UP (ref 2.4–4.7)
PLATELET # BLD AUTO: 141 K/UL — LOW (ref 150–400)
POTASSIUM SERPL-MCNC: 4 MMOL/L — SIGNIFICANT CHANGE UP (ref 3.5–5.3)
POTASSIUM SERPL-SCNC: 4 MMOL/L — SIGNIFICANT CHANGE UP (ref 3.5–5.3)
RBC # BLD: 3.06 M/UL — LOW (ref 3.8–5.2)
RBC # FLD: 18.4 % — HIGH (ref 10.3–14.5)
SODIUM SERPL-SCNC: 142 MMOL/L — SIGNIFICANT CHANGE UP (ref 135–145)
WBC # BLD: 6.87 K/UL — SIGNIFICANT CHANGE UP (ref 3.8–10.5)
WBC # FLD AUTO: 6.87 K/UL — SIGNIFICANT CHANGE UP (ref 3.8–10.5)

## 2022-02-04 PROCEDURE — 99222 1ST HOSP IP/OBS MODERATE 55: CPT

## 2022-02-04 PROCEDURE — 99232 SBSQ HOSP IP/OBS MODERATE 35: CPT

## 2022-02-04 RX ORDER — POLYETHYLENE GLYCOL 3350 17 G/17G
17 POWDER, FOR SOLUTION ORAL
Refills: 0 | Status: ACTIVE | OUTPATIENT
Start: 2022-02-04 | End: 2023-01-03

## 2022-02-04 RX ORDER — OXYCODONE HYDROCHLORIDE 5 MG/1
5 TABLET ORAL EVERY 6 HOURS
Refills: 0 | Status: DISCONTINUED | OUTPATIENT
Start: 2022-02-04 | End: 2022-02-04

## 2022-02-04 RX ORDER — OLANZAPINE 15 MG/1
2.5 TABLET, FILM COATED ORAL ONCE
Refills: 0 | Status: COMPLETED | OUTPATIENT
Start: 2022-02-04 | End: 2022-02-04

## 2022-02-04 RX ORDER — TRAMADOL HYDROCHLORIDE 50 MG/1
25 TABLET ORAL
Refills: 0 | Status: DISCONTINUED | OUTPATIENT
Start: 2022-02-04 | End: 2022-02-07

## 2022-02-04 RX ORDER — TAMSULOSIN HYDROCHLORIDE 0.4 MG/1
0.4 CAPSULE ORAL AT BEDTIME
Refills: 0 | Status: ACTIVE | OUTPATIENT
Start: 2022-02-04 | End: 2023-01-03

## 2022-02-04 RX ADMIN — Medication 81 MILLIGRAM(S): at 09:04

## 2022-02-04 RX ADMIN — POLYETHYLENE GLYCOL 3350 17 GRAM(S): 17 POWDER, FOR SOLUTION ORAL at 17:12

## 2022-02-04 RX ADMIN — MORPHINE SULFATE 4 MILLIGRAM(S): 50 CAPSULE, EXTENDED RELEASE ORAL at 08:59

## 2022-02-04 RX ADMIN — Medication 975 MILLIGRAM(S): at 21:41

## 2022-02-04 RX ADMIN — AMLODIPINE BESYLATE 2.5 MILLIGRAM(S): 2.5 TABLET ORAL at 06:51

## 2022-02-04 RX ADMIN — Medication 975 MILLIGRAM(S): at 06:52

## 2022-02-04 RX ADMIN — TRAMADOL HYDROCHLORIDE 25 MILLIGRAM(S): 50 TABLET ORAL at 20:30

## 2022-02-04 RX ADMIN — Medication 975 MILLIGRAM(S): at 06:51

## 2022-02-04 RX ADMIN — ATORVASTATIN CALCIUM 20 MILLIGRAM(S): 80 TABLET, FILM COATED ORAL at 21:41

## 2022-02-04 RX ADMIN — QUETIAPINE FUMARATE 12.5 MILLIGRAM(S): 200 TABLET, FILM COATED ORAL at 21:41

## 2022-02-04 RX ADMIN — Medication 1 DROP(S): at 09:06

## 2022-02-04 RX ADMIN — TRAMADOL HYDROCHLORIDE 25 MILLIGRAM(S): 50 TABLET ORAL at 19:39

## 2022-02-04 RX ADMIN — TAMSULOSIN HYDROCHLORIDE 0.4 MILLIGRAM(S): 0.4 CAPSULE ORAL at 21:41

## 2022-02-04 RX ADMIN — Medication 975 MILLIGRAM(S): at 09:03

## 2022-02-04 RX ADMIN — Medication 975 MILLIGRAM(S): at 21:49

## 2022-02-04 RX ADMIN — Medication 25 MICROGRAM(S): at 06:51

## 2022-02-04 RX ADMIN — OLANZAPINE 2.5 MILLIGRAM(S): 15 TABLET, FILM COATED ORAL at 21:40

## 2022-02-04 RX ADMIN — SENNA PLUS 2 TABLET(S): 8.6 TABLET ORAL at 21:41

## 2022-02-04 RX ADMIN — ENOXAPARIN SODIUM 40 MILLIGRAM(S): 100 INJECTION SUBCUTANEOUS at 09:04

## 2022-02-04 RX ADMIN — MORPHINE SULFATE 4 MILLIGRAM(S): 50 CAPSULE, EXTENDED RELEASE ORAL at 09:15

## 2022-02-04 RX ADMIN — Medication 1 DROP(S): at 17:12

## 2022-02-04 NOTE — PROGRESS NOTE ADULT - SUBJECTIVE AND OBJECTIVE BOX
ORTHO-TRAUMA SERVICE  Pt Name: DANISH MICHAELS    MRN: [id number]      Patient is a 89 y.o female being followed for Left hip intramedullary nail fixation POD 3. Patient seen and examined bedside. Patient is comfortable and pain is controlled. Patient confused. Minimal participation in exam. Unable to provide history                          9.0    6.87  )-----------( 141      ( 04 Feb 2022 05:59 )             27.6     02-04    142  |  112<H>  |  18.9  ----------------------------<  96  4.0   |  18.0<L>  |  0.64    Ca    7.9<L>      04 Feb 2022 05:59  Phos  2.5     02-04  Mg     2.1     02-04    PHYSICAL EXAM:  Vital Signs Last 24 Hrs  T(C): 36.6 (04 Feb 2022 05:00), Max: 37.1 (03 Feb 2022 16:27)  T(F): 97.9 (04 Feb 2022 05:00), Max: 98.7 (03 Feb 2022 16:27)  HR: 83 (04 Feb 2022 05:00) (68 - 114)  BP: 146/84 (04 Feb 2022 05:00) (118/60 - 165/92)  BP(mean): --  RR: 18 (04 Feb 2022 05:00) (18 - 20)  SpO2: 87% (04 Feb 2022 05:00) (87% - 94%)    Appearance: Alert, confused, in no acute distress.    LLE:  Neurological: Unable to obtain sensory. +DF/Pf spontaneously. Limited secondary to mental status  Skin: Proximal dressing c/d/i. Distal dressing removed by patient. Incision is c/d/i. No active bleeding. New distal dressing placed.   Vascular: 2+ distal pulses. Cap refill < 2 sec. No signs of venous insufficiency or stasis. No extremity ulcerations. No cyanosis.      A/P:  Pt is a  89y Female s/p LEFT Hip intramedullary nail fixation POD 3.    PLAN:   * Pain control  * PT  * DVTp: Lov 40qd  * WBAT with walker assistance  * Monitor Hg as per primary team  * discharge pending pt and medical clearance  * Reconsult orthopedics PRN

## 2022-02-04 NOTE — PROGRESS NOTE ADULT - SUBJECTIVE AND OBJECTIVE BOX
seen for hip fracture, delirium    ros unable to obtain  seems lethargic this am, got morphine 4mg ivp    MEDICATIONS  (STANDING):  acetaminophen     Tablet .. 975 milliGRAM(s) Oral every 8 hours  amLODIPine   Tablet 2.5 milliGRAM(s) Oral daily  artificial  tears Solution 1 Drop(s) Right EYE every 6 hours  aspirin  chewable 81 milliGRAM(s) Oral daily  atorvastatin 20 milliGRAM(s) Oral at bedtime  enoxaparin Injectable 40 milliGRAM(s) SubCutaneous daily  levothyroxine 25 MICROGram(s) Oral daily  QUEtiapine 12.5 milliGRAM(s) Oral at bedtime  senna 2 Tablet(s) Oral at bedtime  tamsulosin 0.4 milliGRAM(s) Oral at bedtime  tranexamic acid IVPB 1000 milliGRAM(s) IV Intermittent once    MEDICATIONS  (PRN):  HYDROmorphone  Injectable 0.5 milliGRAM(s) IV Push every 4 hours PRN breakthrough pain  oxyCODONE    IR 5 milliGRAM(s) Oral every 6 hours PRN mod to severe pain      Allergies    No Known Allergies      Vital Signs Last 24 Hrs  T(C): 36.6 (04 Feb 2022 05:00), Max: 37.1 (03 Feb 2022 16:27)  T(F): 97.9 (04 Feb 2022 05:00), Max: 98.7 (03 Feb 2022 16:27)  HR: 83 (04 Feb 2022 05:00) (68 - 114)  BP: 146/84 (04 Feb 2022 05:00) (133/68 - 165/92)  BP(mean): --  RR: 18 (04 Feb 2022 05:00) (18 - 20)  SpO2: 87% (04 Feb 2022 05:00) (87% - 94%)    PHYSICAL EXAM:    GENERAL: seems comfortable   CHEST/LUNG: Clear to ausculation bilaterally  HEART: Regular rate and rhythm; S1 S2  ABDOMEN: Soft, Bowel sounds present  EXTREMITIES:  left hip dressing c/d/i   NERVOUS SYSTEM:  arousable, opens eyes. does not follow commands.    LABS:                        9.0    6.87  )-----------( 141      ( 04 Feb 2022 05:59 )             27.6     02-04    142  |  112<H>  |  18.9  ----------------------------<  96  4.0   |  18.0<L>  |  0.64    Ca    7.9<L>      04 Feb 2022 05:59  Phos  2.5     02-04  Mg     2.1     02-04            CAPILLARY BLOOD GLUCOSE            RADIOLOGY & ADDITIONAL TESTS:   right upper arm

## 2022-02-04 NOTE — CONSULT NOTE ADULT - ASSESSMENT
89 yo female with a history of HTN, dementia ( a x o x 1 at baseline), poor historian, osteoporosis, arthritis, hypothyroidism, macular degeneration, [place], presents to [facility] with complaints of left hip pain, and per niece, right lower rib pain.  Imaging revealed an acute left intertrochanteric fracture. +Leukocytosis on presentation. Patient is s/p IMN.  Post op course complicated by anemia, requiring PRBC, urinary retention (stover placed, pulled out by pt while agitated), agitation, requiring constant observation.  Palliative care consulted to address goals of care.    #intertrochanteric fracture  -s/p IMN  -ortho following       89 yo female with a history of HTN, dementia ( a x o x 1 at baseline), poor historian, osteoporosis, arthritis, hypothyroidism, macular degeneration, [place], presents to [facility] with complaints of left hip pain, and per niece, right lower rib pain.  Imaging revealed an acute left intertrochanteric fracture. +Leukocytosis on presentation. Patient is s/p IMN.  Post op course complicated by anemia, requiring PRBC, urinary retention (stover placed, pulled out by pt while agitated), agitation, requiring constant observation.  Palliative care consulted to address goals of care.    #intertrochanteric fracture  -s/p IMN  -ortho following    · Assessment	  89 yr old female with dementia, hypothyroidism, TIA presented after a fall, imaging revealed an acute left intertrochanteric fracture, labs with WBC 18 on admission. She is s/p IM nail of left hip. Post operative course was complicated by anemia, Hb 7.4. PRBC transfusion was given. Also developed urinary retention requiring Stover placement. She developed agitation and pulled out Stover, required constant observation. Her Hb did not improve, was 7.4 on 2/3, hence additional PRBC ordered and CT abdomen/pelvis with hip hematoma    1. Acute comminuted intertrochanteric fracture sec to fall s/p IM Nail:  PT evaluation noted, needs AARON.  Pain control    LIMIT NARCOTICS    change oxycodone to tramadol to see if aids in lethargy  Incentive spirometry.    2. Hypothyroidism:  Continue Synthroid.    3. hx TIA:  On low dose aspirin and statin    4. Dementia now with delirium:  Supportive care  Constant observation  c/w low dose Seroquel.  Fall precautions.    5. DVT prophylaxis:  Lovenox     6. Leucocytosis resolved:  Continue to monitor.    7. Postoperative anemia due to blood loss   stable post transfusions x2  CT a/p with hip hematoma.    8. Urinary retention:  Pulled out Stover  repeat bladder scan with retention, replace stover, add flomax;  MOBILIZE        Discussed with RN.  Will consult palliative care. GOC was discussed with arabella on admission, patient is full code.  updated niece Navya, cell: [phone], to be used for updates  Diomedes, nephew: [phone].           89 yo female with a history of HTN, dementia ( a x o x 1 at baseline), poor historian, osteoporosis, arthritis, hypothyroidism, macular degeneration, [place], presents to [facility] with complaints of left hip pain, and per niece, right lower rib pain.  Imaging revealed an acute left intertrochanteric fracture. +Leukocytosis on presentation. Patient is s/p IMN.  Post op course complicated by anemia, requiring PRBC, urinary retention (stover placed, pulled out by pt while agitated), agitation, requiring constant observation.  Palliative care consulted to address goals of care.    #intertrochanteric fracture  -s/p IMN  -ortho following  -on ATC acetaminophen.  -patient noted by hospitalist to be lethargic in am, had received morphine, subsequently d/bhanu.  Oxycodone also d/bhanu.  Now ordered for tramadol prn by hospitalist for moderate-severe pain.  ISTOP does not show any recent prescriptions for controlled substances.      leukocytosis:  -resolved  -continue to trend WBC, monitor for s/s of infection    anemia  - extensive phlegmon/hematoma formation with enlargement of the left thigh on imaging  -s/p 2 units of PRBC.    -continue to trend h/h    Dementia:  -noted to be a x o x 1 at baseline, requiring total assist for ADLs here in the hospital .  Unclear baseline outside of it.  -Attempted to reach niece to learn more about patient, care preference.  Very brief conversation held (see below), unable to obtain this information.    Delirium:  -Likely multifactorial: recent surgery, hospitalization.  Underlying dementia leaves patient at greater risk for delirium.  Provide reassurance, re-orientation, day-night distinction.  Compartmentalize care, minimize interruptions of sleep.  Assure regular bowel/bladder habits.  Be mindful of medications on the Beers criteria.  -On 1:1  -ordered for seroquel 12.5 mg PO HS    Debility  -supportive care  -seen by PT, rec AARON    Palliative care encounter  -full code orders  -no HCP on file; arabella Mendosa listed as contact  -reached Navya briefly to introduce palliative care service.  Call disconnected very shortly after she was initially reached.  Called back, without success.  -palliative care will attempt to reach arabella early next week.    Thank you for involving us in the care of this patient.    Patient reviewed with primary team.

## 2022-02-04 NOTE — PROGRESS NOTE ADULT - ASSESSMENT
89 yr old female with dementia, hypothyroidism, TIA presented after a fall, imaging revealed an acute left intertrochanteric fracture, labs with WBC 18 on admission. She is s/p IM nail of left hip. Post operative course was complicated by anemia, Hb 7.4. PRBC transfusion was given. Also developed urinary retention requiring Stover placement. She developed agitation and pulled out Stover, required constant observation. Her Hb did not improve, was 7.4 on 2/3, hence additional PRBC ordered and CT abdomen/pelvis with hip hematoma    1. Acute comminuted intertrochanteric fracture sec to fall s/p IM Nail:  PT evaluation noted, needs AARON.  Pain control    LIMIT NARCOTICS    change oxycodone to tramadol to see if aids in lethargy  Incentive spirometry.    2. Hypothyroidism:  Continue Synthroid.    3. hx TIA:  On low dose aspirin and statin    4. Dementia now with delirium:  Supportive care  Constant observation  c/w low dose Seroquel.  Fall precautions.    5. DVT prophylaxis:  Lovenox     6. Leucocytosis resolved:  Continue to monitor.    7. Postoperative anemia due to blood loss   stable post transfusions x2  CT a/p with hip hematoma.    8. Urinary retention:  Pulled out Stover  repeat bladder scan with retention, replace stover, add flomax;  MOBILIZE        Discussed with RN.  Will consult palliative care. GOC was discussed with arabella on admission, patient is full code.  updated arabella Mendosa, cell: [phone], to be used for updates  akash Herculesew: [phone].

## 2022-02-04 NOTE — CONSULT NOTE ADULT - SUBJECTIVE AND OBJECTIVE BOX
HPI:  89 yo F From [facility].  Found out of bed on floor next to bed ~ 4 hours ago and CO L hip pain.  Niece HCP at bedside also states she had been CO R lower rib pain.  When asked, pt denies pain to ribs, side, leg, hip or any CO. Niece does not notice other CO.  Very unreliable historian.  Pt unable to give any ROS due to chronic Dementia.  Niece states pt is A&Ox1 at baseline.  (01 Feb 2022 10:15)      HPI:  89 yo female with a history of HTN, dementia ( a x o x 1 at baseline), poor historian, osteoporosis, arthritis, hypothyroidism, macular degeneration, [place], presents to [facility] with complaints of left hip pain, and per niece, right lower rib pain.  When asked, patient denies pain to ribs, sides, legs, hip.  Imaging revealed an acute left intertrochanteric fracture.  Labs also notable for WBC of 18 at time of presentation.      Patient is s/p IMN.  Post op course complicated by anemia, requiring PRBC, urinary retention (stover placed, pulled out by pt while agitated), agitation, requiring constant observation.  CT od abd/pelvis ordered as hemoglobin did not respond appropriately to PRBC transfusion, and imaging revealed:  Redemonstration of extensive phlegmon/hematoma formation with enlargement of the left thigh, associated with the left gluteal musculature, adductor compartment, and proximal thigh, without   significant interval change compared to the prior examination, now with the exception of added foci of soft tissue gas which are postoperative in nature.    In the setting of patient's advanced dementia, palliative care consulted to address goals of care.      PERTINENT PMH REVIEWED: Yes No    PAST MEDICAL & SURGICAL HISTORY:  HTN (hypertension)    Dementia    Osteoporosis    Arthritis    Hypothyroidism    Degeneration macular    Poor historian    [place] (hard of hearing)        SOCIAL HISTORY:  lives at the [facility]                                     Surrogate/HCP/Guardian:  kenyon Mendosaece: cell: [phone]; Diomedes, nephew: [phone].      FAMILY HISTORY:    Baseline ADLs (prior to admission): dependent for ADLs, IADLs.      Allergies    No Known Allergies    Intolerances        Present Symptoms:     Dyspnea: 0 1 2 3   Nausea/Vomiting: Yes No  Anxiety:  Yes No  Depression: Yes No  Fatigue: Yes No  Loss of appetite: Yes No    Pain:             Character-            Duration-            Effect-            Factors-            Frequency-            Location-            Severity-    Review of Systems: Reviewed                     Negative:                     Positive:  Unable to obtain due to poor mentation   All others negative    MEDICATIONS  (STANDING):  acetaminophen     Tablet .. 975 milliGRAM(s) Oral every 8 hours  amLODIPine   Tablet 2.5 milliGRAM(s) Oral daily  artificial  tears Solution 1 Drop(s) Right EYE every 6 hours  aspirin  chewable 81 milliGRAM(s) Oral daily  atorvastatin 20 milliGRAM(s) Oral at bedtime  enoxaparin Injectable 40 milliGRAM(s) SubCutaneous daily  levothyroxine 25 MICROGram(s) Oral daily  QUEtiapine 12.5 milliGRAM(s) Oral at bedtime  senna 2 Tablet(s) Oral at bedtime  sodium chloride 0.9%. 1000 milliLiter(s) (100 mL/Hr) IV Continuous <Continuous>  tranexamic acid IVPB 1000 milliGRAM(s) IV Intermittent once    MEDICATIONS  (PRN):  HYDROmorphone  Injectable 0.5 milliGRAM(s) IV Push every 4 hours PRN breakthrough pain  morphine  - Injectable 4 milliGRAM(s) IV Push every 3 hours PRN Severe Pain (7 - 10)  oxyCODONE    IR 5 milliGRAM(s) Oral every 3 hours PRN Mild Pain (1 - 3)  oxyCODONE    IR 10 milliGRAM(s) Oral every 3 hours PRN Moderate Pain (4 - 6)      PHYSICAL EXAM:    Vital Signs Last 24 Hrs  T(C): 36.6 (04 Feb 2022 05:00), Max: 37.1 (03 Feb 2022 16:27)  T(F): 97.9 (04 Feb 2022 05:00), Max: 98.7 (03 Feb 2022 16:27)  HR: 83 (04 Feb 2022 05:00) (68 - 114)  BP: 146/84 (04 Feb 2022 05:00) (118/60 - 165/92)  BP(mean): --  RR: 18 (04 Feb 2022 05:00) (18 - 20)  SpO2: 87% (04 Feb 2022 05:00) (87% - 94%)    General: alert  oriented x ____ lethargic agitated                  cachexia  nonverbal  coma    Karnofsky:  %    HEENT: normal  dry mouth  ET tube/trach    Lungs: comfortable tachypnea/labored breathing  excessive secretions    CV: normal  tachycardia    GI: normal  distended  tender  no BS               PEG/NG/OG tube  constipation  last BM:     : normal  incontinent  oliguria/anuria  stover    MSK: normal  weakness  edema             ambulatory  bedbound/wheelchair bound    Skin: normal  pressure ulcers- Stage_____  no rash    LABS:                        9.0    6.87  )-----------( 141      ( 04 Feb 2022 05:59 )             27.6     02-04    142  |  112<H>  |  18.9  ----------------------------<  96  4.0   |  18.0<L>  |  0.64    Ca    7.9<L>      04 Feb 2022 05:59  Phos  2.5     02-04  Mg     2.1     02-04          I&O's Summary    03 Feb 2022 07:01  -  04 Feb 2022 07:00  --------------------------------------------------------  IN: 320 mL / OUT: 425 mL / NET: -105 mL        RADIOLOGY & ADDITIONAL STUDIES:    ADVANCE DIRECTIVES:   DNR YES NO  Completed on:                     MOLST  YES NO   Completed on:  Living Will  YES NO   Completed on:    ISTOP Report:  Sandra Austin, 03/15/1929   Search Date: 02/04/2022 09:34:16 AM   The Drug Utilization Report below displays all of the controlled substance prescriptions, if any, that your patient has filled in the last twelve months. The information displayed on this report is compiled from pharmacy submissions to the Department, and accurately reflects the information as submitted by the pharmacies.  This report was requested by: Yanet Colon | Reference #: [id number]   There are no results for the search terms that you entered.       HPI:  89 yo F From [facility].  Found out of bed on floor next to bed ~ 4 hours ago and CO L hip pain.  Niece HCP at bedside also states she had been CO R lower rib pain.  When asked, pt denies pain to ribs, side, leg, hip or any CO. Niece does not notice other CO.  Very unreliable historian.  Pt unable to give any ROS due to chronic Dementia.  Niece states pt is A&Ox1 at baseline.  (01 Feb 2022 10:15)      HPI:  89 yo female with a history of HTN, dementia ( a x o x 1 at baseline), poor historian, osteoporosis, arthritis, hypothyroidism, macular degeneration, [place], presents to [facility] with complaints of left hip pain, and per niece, right lower rib pain.  When asked, patient denies pain to ribs, sides, legs, hip.  Imaging revealed an acute left intertrochanteric fracture.  Labs also notable for WBC of 18 at time of presentation.      Patient is s/p IMN.  Post op course complicated by anemia, requiring PRBC, urinary retention (stover placed, pulled out by pt while agitated), agitation, requiring constant observation.  Currently on 1:1.  CT of abd/pelvis ordered as hemoglobin did not respond appropriately to PRBC transfusion, and imaging revealed:  Redemonstration of extensive phlegmon/hematoma formation with enlargement of the left thigh, associated with the left gluteal musculature, adductor compartment, and proximal thigh, without significant interval change compared to the prior examination, now with the exception of added foci of soft tissue gas which are postoperative in nature.    In the setting of patient's advanced dementia, palliative care consulted to address goals of care.      PERTINENT PMH REVIEWED: Yes    PAST MEDICAL & SURGICAL HISTORY:  HTN (hypertension)    Dementia    Osteoporosis    Arthritis    Hypothyroidism    Degeneration macular    Poor historian    [place] (hard of hearing)        SOCIAL HISTORY:  lives at the [facility]                                     Surrogate/HCP/Guardian:  kenyon Mendosaece: cell: [phone]; Diomedes, nephew: [phone].      FAMILY HISTORY: unable to obtains 2/2 mental status.     Baseline ADLs (prior to admission):  Resides in an long term.  Per notes, requires total assistance for ADLs      Allergies    No Known Allergies    Intolerances        Present Symptoms: limited by patient response    Dyspnea: denies  Nausea/Vomiting: denies  Anxiety: unable to obtain  Depression: unable to obtain  Fatigue: unable to obtain  Loss of appetite: unable to obtain    Pain: denies    Review of Systems: Reviewed.  Specifics limited to above.  When asked if there is anything troubling her, patient says no.  Per RN, eats well      MEDICATIONS  (STANDING):  acetaminophen     Tablet .. 975 milliGRAM(s) Oral every 8 hours  amLODIPine   Tablet 2.5 milliGRAM(s) Oral daily  artificial  tears Solution 1 Drop(s) Right EYE every 6 hours  aspirin  chewable 81 milliGRAM(s) Oral daily  atorvastatin 20 milliGRAM(s) Oral at bedtime  enoxaparin Injectable 40 milliGRAM(s) SubCutaneous daily  levothyroxine 25 MICROGram(s) Oral daily  QUEtiapine 12.5 milliGRAM(s) Oral at bedtime  senna 2 Tablet(s) Oral at bedtime  sodium chloride 0.9%. 1000 milliLiter(s) (100 mL/Hr) IV Continuous <Continuous>  tranexamic acid IVPB 1000 milliGRAM(s) IV Intermittent once    MEDICATIONS  (PRN):  HYDROmorphone  Injectable 0.5 milliGRAM(s) IV Push every 4 hours PRN breakthrough pain  morphine  - Injectable 4 milliGRAM(s) IV Push every 3 hours PRN Severe Pain (7 - 10)  oxyCODONE    IR 5 milliGRAM(s) Oral every 3 hours PRN Mild Pain (1 - 3)  oxyCODONE    IR 10 milliGRAM(s) Oral every 3 hours PRN Moderate Pain (4 - 6)      PHYSICAL EXAM:    Vital Signs Last 24 Hrs  T(C): 36.6 (04 Feb 2022 05:00), Max: 37.1 (03 Feb 2022 16:27)  T(F): 97.9 (04 Feb 2022 05:00), Max: 98.7 (03 Feb 2022 16:27)  HR: 83 (04 Feb 2022 05:00) (68 - 114)  BP: 146/84 (04 Feb 2022 05:00) (118/60 - 165/92)  BP(mean): --  RR: 18 (04 Feb 2022 05:00) (18 - 20)  SpO2: 87% (04 Feb 2022 05:00) (87% - 94%)    Karnofsky: 30  %    Gen: In NAD.  No pain behaviors or work of breathing noted  Neuro: alert and oriented to self only, answers few, simple questions  Head: NC/AT  Eyes: sclerae non-icteric  ENT: moist oral mucosa  Resp: unlabored  Abd: soft, non-tender  : stover bag without gross hematuria  MSK: LLE dressing: C/D/I  Peripheral Vasc: warm  Int: warm and dry  Psych: no psychomotor agitation      LABS:                        9.0    6.87  )-----------( 141      ( 04 Feb 2022 05:59 )             27.6     02-04    142  |  112<H>  |  18.9  ----------------------------<  96  4.0   |  18.0<L>  |  0.64    Ca    7.9<L>      04 Feb 2022 05:59  Phos  2.5     02-04  Mg     2.1     02-04          I&O's Summary    03 Feb 2022 07:01  -  04 Feb 2022 07:00  --------------------------------------------------------  IN: 320 mL / OUT: 425 mL / NET: -105 mL        RADIOLOGY & ADDITIONAL STUDIES: reviewed    ADVANCE DIRECTIVES:   Full code orders   No HCP on file: Navya bell, listed as contact    ISTOP Report:  Sandra Austin, 03/15/1929   Search Date: 02/04/2022 09:34:16 AM   The Drug Utilization Report below displays all of the controlled substance prescriptions, if any, that your patient has filled in the last twelve months. The information displayed on this report is compiled from pharmacy submissions to the Department, and accurately reflects the information as submitted by the pharmacies.  This report was requested by: Yanet Colon | Reference #: [id number]   There are no results for the search terms that you entered.

## 2022-02-05 PROCEDURE — 99233 SBSQ HOSP IP/OBS HIGH 50: CPT

## 2022-02-05 RX ORDER — QUETIAPINE FUMARATE 200 MG/1
25 TABLET, FILM COATED ORAL AT BEDTIME
Refills: 0 | Status: DISCONTINUED | OUTPATIENT
Start: 2022-02-05 | End: 2022-02-07

## 2022-02-05 RX ORDER — QUETIAPINE FUMARATE 200 MG/1
12.5 TABLET, FILM COATED ORAL EVERY 8 HOURS
Refills: 0 | Status: ACTIVE | OUTPATIENT
Start: 2022-02-05 | End: 2023-01-04

## 2022-02-05 RX ADMIN — POLYETHYLENE GLYCOL 3350 17 GRAM(S): 17 POWDER, FOR SOLUTION ORAL at 17:49

## 2022-02-05 RX ADMIN — QUETIAPINE FUMARATE 12.5 MILLIGRAM(S): 200 TABLET, FILM COATED ORAL at 20:07

## 2022-02-05 RX ADMIN — TRAMADOL HYDROCHLORIDE 25 MILLIGRAM(S): 50 TABLET ORAL at 09:55

## 2022-02-05 RX ADMIN — Medication 1 DROP(S): at 17:49

## 2022-02-05 RX ADMIN — Medication 1 DROP(S): at 14:22

## 2022-02-05 RX ADMIN — QUETIAPINE FUMARATE 25 MILLIGRAM(S): 200 TABLET, FILM COATED ORAL at 22:52

## 2022-02-05 RX ADMIN — TRAMADOL HYDROCHLORIDE 25 MILLIGRAM(S): 50 TABLET ORAL at 10:55

## 2022-02-05 RX ADMIN — Medication 975 MILLIGRAM(S): at 14:23

## 2022-02-05 RX ADMIN — Medication 975 MILLIGRAM(S): at 06:25

## 2022-02-05 RX ADMIN — AMLODIPINE BESYLATE 2.5 MILLIGRAM(S): 2.5 TABLET ORAL at 06:14

## 2022-02-05 RX ADMIN — Medication 81 MILLIGRAM(S): at 14:23

## 2022-02-05 RX ADMIN — ENOXAPARIN SODIUM 40 MILLIGRAM(S): 100 INJECTION SUBCUTANEOUS at 14:22

## 2022-02-05 RX ADMIN — Medication 975 MILLIGRAM(S): at 23:55

## 2022-02-05 RX ADMIN — HYDROMORPHONE HYDROCHLORIDE 0.5 MILLIGRAM(S): 2 INJECTION INTRAMUSCULAR; INTRAVENOUS; SUBCUTANEOUS at 02:30

## 2022-02-05 RX ADMIN — SENNA PLUS 2 TABLET(S): 8.6 TABLET ORAL at 22:52

## 2022-02-05 RX ADMIN — Medication 975 MILLIGRAM(S): at 15:23

## 2022-02-05 RX ADMIN — TAMSULOSIN HYDROCHLORIDE 0.4 MILLIGRAM(S): 0.4 CAPSULE ORAL at 22:53

## 2022-02-05 RX ADMIN — Medication 25 MICROGRAM(S): at 06:14

## 2022-02-05 RX ADMIN — Medication 975 MILLIGRAM(S): at 22:53

## 2022-02-05 RX ADMIN — Medication 1 DROP(S): at 06:19

## 2022-02-05 RX ADMIN — Medication 1 DROP(S): at 23:00

## 2022-02-05 RX ADMIN — HYDROMORPHONE HYDROCHLORIDE 0.5 MILLIGRAM(S): 2 INJECTION INTRAMUSCULAR; INTRAVENOUS; SUBCUTANEOUS at 01:57

## 2022-02-05 RX ADMIN — Medication 975 MILLIGRAM(S): at 06:13

## 2022-02-05 RX ADMIN — ATORVASTATIN CALCIUM 20 MILLIGRAM(S): 80 TABLET, FILM COATED ORAL at 22:52

## 2022-02-05 RX ADMIN — POLYETHYLENE GLYCOL 3350 17 GRAM(S): 17 POWDER, FOR SOLUTION ORAL at 06:14

## 2022-02-05 NOTE — PROGRESS NOTE ADULT - SUBJECTIVE AND OBJECTIVE BOX
[facility] Division of Hospital Medicine    Chief Complaint:  L hip fx    SUBJECTIVE: pt is confused and remains delirius    OVERNIGHT EVENTS: none reported     ROS is not available due to pt mentation at this time, no apparent surrogate signs of pain;     MEDICATIONS  (STANDING):  acetaminophen     Tablet .. 975 milliGRAM(s) Oral every 8 hours  amLODIPine   Tablet 2.5 milliGRAM(s) Oral daily  artificial  tears Solution 1 Drop(s) Right EYE every 6 hours  aspirin  chewable 81 milliGRAM(s) Oral daily  atorvastatin 20 milliGRAM(s) Oral at bedtime  enoxaparin Injectable 40 milliGRAM(s) SubCutaneous daily  levothyroxine 25 MICROGram(s) Oral daily  polyethylene glycol 3350 17 Gram(s) Oral two times a day  QUEtiapine 25 milliGRAM(s) Oral at bedtime  senna 2 Tablet(s) Oral at bedtime  tamsulosin 0.4 milliGRAM(s) Oral at bedtime  tranexamic acid IVPB 1000 milliGRAM(s) IV Intermittent once    MEDICATIONS  (PRN):  HYDROmorphone  Injectable 0.5 milliGRAM(s) IV Push every 4 hours PRN breakthrough pain  QUEtiapine 12.5 milliGRAM(s) Oral every 8 hours PRN restlessness  traMADol 25 milliGRAM(s) Oral four times a day PRN mod to severe pain        I&O's Summary    04 Feb 2022 07:01  -  05 Feb 2022 07:00  --------------------------------------------------------  IN: 0 mL / OUT: 1050 mL / NET: -1050 mL    05 Feb 2022 07:01  -  05 Feb 2022 14:36  --------------------------------------------------------  IN: 0 mL / OUT: 1150 mL / NET: -1150 mL        PHYSICAL EXAM:  Vital Signs Last 24 Hrs  T(C): 37.1 (05 Feb 2022 10:40), Max: 37.1 (05 Feb 2022 10:40)  T(F): 98.7 (05 Feb 2022 10:40), Max: 98.7 (05 Feb 2022 10:40)  HR: 89 (05 Feb 2022 10:40) (89 - 91)  BP: 132/82 (05 Feb 2022 10:40) (132/82 - 139/74)  BP(mean): --  RR: 18 (05 Feb 2022 10:40) (18 - 18)  SpO2: 94% (05 Feb 2022 10:40) (94% - 95%)        CONSTITUTIONAL: NAD, fragile elderly lady   ENMT: Moist oral mucosa, no pharyngeal injection or exudates; normal dentition; No JVD  RESPIRATORY: Normal respiratory effort; lungs are clear to auscultation bilaterally  CARDIOVASCULAR: Regular rate and rhythm, normal S1 and S2, no murmur/rub/gallop; No lower extremity edema; Peripheral pulses are 2+ bilaterally  ABDOMEN: Nontender to palpation, normoactive bowel sounds, no rebound/guarding; No hepatosplenomegaly  MUSCLOSKELETAL:  no clubbing or cyanosis of digits; no joint swelling or tenderness to palpation  PSYCH: A+O to person only, confused   NEUROLOGY: CN 2-12 are intact and symmetric; no gross sensory deficits; was observed moving all 4 ext against gravity cooperating with exam.   SKIN: dry and atrophic skin on all ext, hemosiderosis on ble from venous insufficiency     LABS:                        9.0    6.87  )-----------( 141      ( 04 Feb 2022 05:59 )             27.6     02-04    142  |  112<H>  |  18.9  ----------------------------<  96  4.0   |  18.0<L>  |  0.64    Ca    7.9<L>      04 Feb 2022 05:59  Phos  2.5     02-04  Mg     2.1     02-04                CAPILLARY BLOOD GLUCOSE            RADIOLOGY & ADDITIONAL TESTS:  Results Reviewed:   Imaging Personally Reviewed:  Electrocardiogram Personally Reviewed:

## 2022-02-05 NOTE — PROGRESS NOTE ADULT - ASSESSMENT
89 yr old female with dementia, hypothyroidism, TIA presented after a fall, imaging revealed an acute left intertrochanteric fracture, labs with WBC 18 on admission. She is s/p IM nail of left hip. Post operative course was complicated by anemia, Hb 7.4. PRBC transfusion was given. Also developed urinary retention requiring Stover placement. She developed agitation and pulled out Stover, required constant observation. Her Hb did not improve, was 7.4 on 2/3, hence additional PRBC ordered and CT abdomen/pelvis with hip hematoma.    1. Acute comminuted intertrochanteric fracture sec to fall s/p IM Nails  PT evaluation noted, needs AARON.  c/w tylenol schedule, tramadol and iv hydromorphine prn base on pain scale  pt/ot daily if mentation allows    2.  Dementia now with delirium  still delirious on exam  c/w Constant observation  titrating up seroquel 25 qhs, 12.5q8h prn, may give Zyprexa for sever agitation if needed.  Fall precautions    3. hx TIA: On low dose aspirin and statin    4.Hypothyroidism: Continue Synthroid    5. DVT prophylaxis:  Lovenox     6. Leucocytosis resolved:  Continue to monitor.    7. Postoperative anemia due to blood loss   stable; post transfusions x2  CT a/p with hip hematoma.    8. Urinary retention  c/w  stover and flomax  next voiding try when mobile or less delirious     DVT ppx - SCD for now, if h/h stable may start sq heparin  Code/social - full code for now, Navya, cell: [phone] updated at bed side

## 2022-02-06 LAB
ANION GAP SERPL CALC-SCNC: 11 MMOL/L — SIGNIFICANT CHANGE UP (ref 5–17)
BUN SERPL-MCNC: 17.7 MG/DL — SIGNIFICANT CHANGE UP (ref 8–20)
CALCIUM SERPL-MCNC: 7.8 MG/DL — LOW (ref 8.6–10.2)
CHLORIDE SERPL-SCNC: 110 MMOL/L — HIGH (ref 98–107)
CO2 SERPL-SCNC: 18 MMOL/L — LOW (ref 22–29)
CREAT SERPL-MCNC: 0.64 MG/DL — SIGNIFICANT CHANGE UP (ref 0.5–1.3)
GLUCOSE SERPL-MCNC: 118 MG/DL — HIGH (ref 70–99)
HCT VFR BLD CALC: 25.6 % — LOW (ref 34.5–45)
HGB BLD-MCNC: 8.3 G/DL — LOW (ref 11.5–15.5)
MCHC RBC-ENTMCNC: 29.2 PG — SIGNIFICANT CHANGE UP (ref 27–34)
MCHC RBC-ENTMCNC: 32.4 GM/DL — SIGNIFICANT CHANGE UP (ref 32–36)
MCV RBC AUTO: 90.1 FL — SIGNIFICANT CHANGE UP (ref 80–100)
PLATELET # BLD AUTO: 195 K/UL — SIGNIFICANT CHANGE UP (ref 150–400)
POTASSIUM SERPL-MCNC: 3.7 MMOL/L — SIGNIFICANT CHANGE UP (ref 3.5–5.3)
POTASSIUM SERPL-SCNC: 3.7 MMOL/L — SIGNIFICANT CHANGE UP (ref 3.5–5.3)
RBC # BLD: 2.84 M/UL — LOW (ref 3.8–5.2)
RBC # FLD: 16.1 % — HIGH (ref 10.3–14.5)
SODIUM SERPL-SCNC: 138 MMOL/L — SIGNIFICANT CHANGE UP (ref 135–145)
WBC # BLD: 6.66 K/UL — SIGNIFICANT CHANGE UP (ref 3.8–10.5)
WBC # FLD AUTO: 6.66 K/UL — SIGNIFICANT CHANGE UP (ref 3.8–10.5)

## 2022-02-06 PROCEDURE — 99233 SBSQ HOSP IP/OBS HIGH 50: CPT

## 2022-02-06 RX ADMIN — Medication 975 MILLIGRAM(S): at 13:03

## 2022-02-06 RX ADMIN — ATORVASTATIN CALCIUM 20 MILLIGRAM(S): 80 TABLET, FILM COATED ORAL at 22:28

## 2022-02-06 RX ADMIN — TRAMADOL HYDROCHLORIDE 25 MILLIGRAM(S): 50 TABLET ORAL at 01:05

## 2022-02-06 RX ADMIN — TAMSULOSIN HYDROCHLORIDE 0.4 MILLIGRAM(S): 0.4 CAPSULE ORAL at 22:28

## 2022-02-06 RX ADMIN — Medication 81 MILLIGRAM(S): at 11:55

## 2022-02-06 RX ADMIN — Medication 975 MILLIGRAM(S): at 05:57

## 2022-02-06 RX ADMIN — Medication 25 MICROGRAM(S): at 05:57

## 2022-02-06 RX ADMIN — Medication 975 MILLIGRAM(S): at 22:27

## 2022-02-06 RX ADMIN — POLYETHYLENE GLYCOL 3350 17 GRAM(S): 17 POWDER, FOR SOLUTION ORAL at 05:58

## 2022-02-06 RX ADMIN — TRAMADOL HYDROCHLORIDE 25 MILLIGRAM(S): 50 TABLET ORAL at 18:33

## 2022-02-06 RX ADMIN — Medication 1 DROP(S): at 17:01

## 2022-02-06 RX ADMIN — TRAMADOL HYDROCHLORIDE 25 MILLIGRAM(S): 50 TABLET ORAL at 00:07

## 2022-02-06 RX ADMIN — Medication 1 DROP(S): at 11:55

## 2022-02-06 RX ADMIN — Medication 975 MILLIGRAM(S): at 06:24

## 2022-02-06 RX ADMIN — SENNA PLUS 2 TABLET(S): 8.6 TABLET ORAL at 22:26

## 2022-02-06 RX ADMIN — POLYETHYLENE GLYCOL 3350 17 GRAM(S): 17 POWDER, FOR SOLUTION ORAL at 17:01

## 2022-02-06 RX ADMIN — Medication 975 MILLIGRAM(S): at 23:28

## 2022-02-06 RX ADMIN — QUETIAPINE FUMARATE 25 MILLIGRAM(S): 200 TABLET, FILM COATED ORAL at 22:27

## 2022-02-06 RX ADMIN — Medication 975 MILLIGRAM(S): at 14:01

## 2022-02-06 RX ADMIN — TRAMADOL HYDROCHLORIDE 25 MILLIGRAM(S): 50 TABLET ORAL at 16:42

## 2022-02-06 RX ADMIN — Medication 1 DROP(S): at 06:02

## 2022-02-06 RX ADMIN — ENOXAPARIN SODIUM 40 MILLIGRAM(S): 100 INJECTION SUBCUTANEOUS at 11:54

## 2022-02-06 NOTE — DIETITIAN INITIAL EVALUATION ADULT. - OTHER INFO
89 yr old female with dementia, hypothyroidism, TIA presented after a fall, imaging revealed an acute left intertrochanteric fracture, labs with WBC 18 on admission. She is s/p IM nail of left hip. Post operative course was complicated by anemia, Hb 7.4. PRBC transfusion was given. Also developed urinary retention requiring Brenner placement. She developed agitation and pulled out Brenner, required constant observation. Her Hb did not improve, was 7.4 on 2/3, hence additional PRBC ordered and CT abdomen/pelvis with hip hematoma.   Acute comminuted intertrochanteric fracture sec to fall s/p IM Nails  PT evaluation noted, needs AARON

## 2022-02-06 NOTE — PROGRESS NOTE ADULT - SUBJECTIVE AND OBJECTIVE BOX
[facility] Division of Hospital Medicine    Chief Complaint:  L hip fx    SUBJECTIVE: pt is under effect of medication, appears comfortable     OVERNIGHT EVENTS: none reported     ROS is not available due to pt mentation at this time, no apparent surrogate signs of pain;     MEDICATIONS  (STANDING):  acetaminophen     Tablet .. 975 milliGRAM(s) Oral every 8 hours  amLODIPine   Tablet 2.5 milliGRAM(s) Oral daily  artificial  tears Solution 1 Drop(s) Right EYE every 6 hours  aspirin  chewable 81 milliGRAM(s) Oral daily  atorvastatin 20 milliGRAM(s) Oral at bedtime  enoxaparin Injectable 40 milliGRAM(s) SubCutaneous daily  levothyroxine 25 MICROGram(s) Oral daily  polyethylene glycol 3350 17 Gram(s) Oral two times a day  QUEtiapine 25 milliGRAM(s) Oral at bedtime  senna 2 Tablet(s) Oral at bedtime  tamsulosin 0.4 milliGRAM(s) Oral at bedtime  tranexamic acid IVPB 1000 milliGRAM(s) IV Intermittent once    MEDICATIONS  (PRN):  HYDROmorphone  Injectable 0.5 milliGRAM(s) IV Push every 4 hours PRN breakthrough pain  QUEtiapine 12.5 milliGRAM(s) Oral every 8 hours PRN restlessness  traMADol 25 milliGRAM(s) Oral four times a day PRN mod to severe pain        I&O's Summary    04 Feb 2022 07:01  -  05 Feb 2022 07:00  --------------------------------------------------------  IN: 0 mL / OUT: 1050 mL / NET: -1050 mL    05 Feb 2022 07:01  -  05 Feb 2022 14:36  --------------------------------------------------------  IN: 0 mL / OUT: 1150 mL / NET: -1150 mL        PHYSICAL EXAM:  Vital Signs Last 24 Hrs  T(C): 36.7 (06 Feb 2022 04:07), Max: 37.1 (05 Feb 2022 10:40)  T(F): 98.1 (06 Feb 2022 04:07), Max: 98.7 (05 Feb 2022 10:40)  HR: 72 (06 Feb 2022 04:07) (72 - 93)  BP: 105/55 (06 Feb 2022 04:07) (105/55 - 138/72)  BP(mean): --  RR: 18 (06 Feb 2022 04:07) (18 - 18)  SpO2: 92% (06 Feb 2022 04:07) (92% - 94%)        CONSTITUTIONAL: NAD, fragile elderly lady   ENMT: Moist oral mucosa, no pharyngeal injection or exudates; normal dentition; No JVD  RESPIRATORY: Normal respiratory effort; lungs are clear to auscultation bilaterally  CARDIOVASCULAR: Regular rate and rhythm, normal S1 and S2, no murmur/rub/gallop; No lower extremity edema; Peripheral pulses are 2+ bilaterally  ABDOMEN: Nontender to palpation, normoactive bowel sounds, no rebound/guarding; No hepatosplenomegaly  MUSCLOSKELETAL:  no clubbing or cyanosis of digits; no joint swelling or tenderness to palpation  PSYCH: A+O to person only, confused   NEUROLOGY: CN 2-12 are intact and symmetric; no gross sensory deficits; was observed moving all 4 ext against gravity cooperating with exam.   SKIN: dry and atrophic skin on all ext, hemosiderosis on ble from venous insufficiency     LABS:                        8.3    6.66  )-----------( 195      ( 06 Feb 2022 06:17 )             25.6     02-06    138  |  110<H>  |  17.7  ----------------------------<  118<H>  3.7   |  18.0<L>  |  0.64    Ca    7.8<L>      06 Feb 2022 06:17                CAPILLARY BLOOD GLUCOSE            RADIOLOGY & ADDITIONAL TESTS:  Results Reviewed:   Imaging Personally Reviewed:  Electrocardiogram Personally Reviewed:

## 2022-02-06 NOTE — DIETITIAN NUTRITION RISK NOTIFICATION - TREATMENT: THE FOLLOWING DIET HAS BEEN RECOMMENDED
Diet, Regular:   Supplement Feeding Modality:  Oral  Ensure Clear Cans or Servings Per Day:  3       Frequency:  Three Times a day (02-03-22 @ 16:39) [Active]

## 2022-02-06 NOTE — DIETITIAN INITIAL EVALUATION ADULT. - PERTINENT MEDS FT
MEDICATIONS  (STANDING):  acetaminophen     Tablet .. 975 milliGRAM(s) Oral every 8 hours  amLODIPine   Tablet 2.5 milliGRAM(s) Oral daily  artificial  tears Solution 1 Drop(s) Right EYE every 6 hours  aspirin  chewable 81 milliGRAM(s) Oral daily  atorvastatin 20 milliGRAM(s) Oral at bedtime  enoxaparin Injectable 40 milliGRAM(s) SubCutaneous daily  levothyroxine 25 MICROGram(s) Oral daily  polyethylene glycol 3350 17 Gram(s) Oral two times a day  QUEtiapine 25 milliGRAM(s) Oral at bedtime  senna 2 Tablet(s) Oral at bedtime  tamsulosin 0.4 milliGRAM(s) Oral at bedtime  tranexamic acid IVPB 1000 milliGRAM(s) IV Intermittent once    MEDICATIONS  (PRN):  HYDROmorphone  Injectable 0.5 milliGRAM(s) IV Push every 4 hours PRN breakthrough pain  QUEtiapine 12.5 milliGRAM(s) Oral every 8 hours PRN restlessness  traMADol 25 milliGRAM(s) Oral four times a day PRN mod to severe pain

## 2022-02-06 NOTE — PROGRESS NOTE ADULT - ASSESSMENT
89 yr old female with dementia, hypothyroidism, TIA presented after a fall, imaging revealed an acute left intertrochanteric fracture, labs with WBC 18 on admission. She is s/p IM nail of left hip. Post operative course was complicated by anemia, Hb 7.4. PRBC transfusion was given. Also developed urinary retention requiring Stover placement. She developed agitation and pulled out Stover, required constant observation. Her Hb did not improve, was 7.4 on 2/3, hence additional PRBC ordered and CT abdomen/pelvis with hip hematoma.    1. Acute comminuted intertrochanteric fracture sec to fall s/p IM Nails  PT evaluation noted, needs AARON.  c/w tylenol schedule, tramadol and iv hydromorphine prn base on pain scale  pt/ot daily if mentation allows    2.  Dementia now with delirium  still delirious, but somewhat more redirectable  and calm  c/w Constant observation  c/w wtih  seroquel 25 qhs, 12.5q8h prn, may give Zyprexa for sever agitation if needed.  Fall precautions    3. hx TIA: On low dose aspirin and statin    4.Hypothyroidism: Continue Synthroid    5. DVT prophylaxis:  Lovenox     6. Leucocytosis resolved:  Continue to monitor.    7. Postoperative anemia due to blood loss   stable; post transfusions x2  CT a/p with hip hematoma.    8. Urinary retention  c/w  stover and flomax  next voiding try when mobile or less delirious     DVT ppx - SCD for now, if h/h stable may start sq heparin  Code/social - full code for now, Navya, cell: [phone] updated at bed side

## 2022-02-06 NOTE — DIETITIAN INITIAL EVALUATION ADULT. - ORAL INTAKE PTA/DIET HISTORY
Pt with advanced dementia, pleasantly confused at interview, history obtained from her daughter at the bedside (who is a dietitian). Per daughter the patient's PO intake has been poor for a week or so, unsure of weight loss. Difficulty chewing meat and needs assistance with meals.

## 2022-02-06 NOTE — DIETITIAN INITIAL EVALUATION ADULT. - PERTINENT LABORATORY DATA
02-06 Na138 mmol/L Glu 118 mg/dL<H> K+ 3.7 mmol/L Cr  0.64 mg/dL BUN 17.7 mg/dL Phos n/a   Alb n/a   PAB n/a

## 2022-02-07 LAB
ANION GAP SERPL CALC-SCNC: 10 MMOL/L — SIGNIFICANT CHANGE UP (ref 5–17)
BUN SERPL-MCNC: 21.8 MG/DL — HIGH (ref 8–20)
CALCIUM SERPL-MCNC: 7.7 MG/DL — LOW (ref 8.6–10.2)
CHLORIDE SERPL-SCNC: 110 MMOL/L — HIGH (ref 98–107)
CO2 SERPL-SCNC: 20 MMOL/L — LOW (ref 22–29)
CREAT SERPL-MCNC: 0.68 MG/DL — SIGNIFICANT CHANGE UP (ref 0.5–1.3)
GLUCOSE SERPL-MCNC: 106 MG/DL — HIGH (ref 70–99)
HCT VFR BLD CALC: 25.7 % — LOW (ref 34.5–45)
HGB BLD-MCNC: 8.4 G/DL — LOW (ref 11.5–15.5)
MCHC RBC-ENTMCNC: 30 PG — SIGNIFICANT CHANGE UP (ref 27–34)
MCHC RBC-ENTMCNC: 32.7 GM/DL — SIGNIFICANT CHANGE UP (ref 32–36)
MCV RBC AUTO: 91.8 FL — SIGNIFICANT CHANGE UP (ref 80–100)
PLATELET # BLD AUTO: 221 K/UL — SIGNIFICANT CHANGE UP (ref 150–400)
POTASSIUM SERPL-MCNC: 3.5 MMOL/L — SIGNIFICANT CHANGE UP (ref 3.5–5.3)
POTASSIUM SERPL-SCNC: 3.5 MMOL/L — SIGNIFICANT CHANGE UP (ref 3.5–5.3)
RBC # BLD: 2.8 M/UL — LOW (ref 3.8–5.2)
RBC # FLD: 15.8 % — HIGH (ref 10.3–14.5)
SODIUM SERPL-SCNC: 140 MMOL/L — SIGNIFICANT CHANGE UP (ref 135–145)
WBC # BLD: 6.38 K/UL — SIGNIFICANT CHANGE UP (ref 3.8–10.5)
WBC # FLD AUTO: 6.38 K/UL — SIGNIFICANT CHANGE UP (ref 3.8–10.5)

## 2022-02-07 PROCEDURE — 99232 SBSQ HOSP IP/OBS MODERATE 35: CPT

## 2022-02-07 PROCEDURE — 99497 ADVNCD CARE PLAN 30 MIN: CPT

## 2022-02-07 RX ORDER — SODIUM CHLORIDE 9 MG/ML
1000 INJECTION, SOLUTION INTRAVENOUS
Refills: 0 | Status: COMPLETED | OUTPATIENT
Start: 2022-02-07 | End: 2022-02-07

## 2022-02-07 RX ORDER — IBUPROFEN 200 MG
400 TABLET ORAL
Refills: 0 | Status: DISCONTINUED | OUTPATIENT
Start: 2022-02-07 | End: 2022-02-08

## 2022-02-07 RX ORDER — ASCORBIC ACID 60 MG
500 TABLET,CHEWABLE ORAL
Refills: 0 | Status: ACTIVE | OUTPATIENT
Start: 2022-02-07 | End: 2023-01-06

## 2022-02-07 RX ORDER — LANOLIN ALCOHOL/MO/W.PET/CERES
3 CREAM (GRAM) TOPICAL AT BEDTIME
Refills: 0 | Status: ACTIVE | OUTPATIENT
Start: 2022-02-07 | End: 2023-01-06

## 2022-02-07 RX ORDER — RISPERIDONE 4 MG/1
0.25 TABLET ORAL
Refills: 0 | Status: DISCONTINUED | OUTPATIENT
Start: 2022-02-07 | End: 2022-02-09

## 2022-02-07 RX ORDER — POTASSIUM CHLORIDE 20 MEQ
20 PACKET (EA) ORAL ONCE
Refills: 0 | Status: COMPLETED | OUTPATIENT
Start: 2022-02-07 | End: 2022-02-07

## 2022-02-07 RX ADMIN — POLYETHYLENE GLYCOL 3350 17 GRAM(S): 17 POWDER, FOR SOLUTION ORAL at 17:05

## 2022-02-07 RX ADMIN — Medication 1 DROP(S): at 05:01

## 2022-02-07 RX ADMIN — AMLODIPINE BESYLATE 2.5 MILLIGRAM(S): 2.5 TABLET ORAL at 05:02

## 2022-02-07 RX ADMIN — Medication 975 MILLIGRAM(S): at 06:00

## 2022-02-07 RX ADMIN — Medication 975 MILLIGRAM(S): at 12:36

## 2022-02-07 RX ADMIN — SENNA PLUS 2 TABLET(S): 8.6 TABLET ORAL at 22:12

## 2022-02-07 RX ADMIN — Medication 975 MILLIGRAM(S): at 22:11

## 2022-02-07 RX ADMIN — Medication 975 MILLIGRAM(S): at 05:01

## 2022-02-07 RX ADMIN — Medication 975 MILLIGRAM(S): at 11:02

## 2022-02-07 RX ADMIN — Medication 500 MILLIGRAM(S): at 17:04

## 2022-02-07 RX ADMIN — TRAMADOL HYDROCHLORIDE 25 MILLIGRAM(S): 50 TABLET ORAL at 02:16

## 2022-02-07 RX ADMIN — ENOXAPARIN SODIUM 40 MILLIGRAM(S): 100 INJECTION SUBCUTANEOUS at 11:02

## 2022-02-07 RX ADMIN — POLYETHYLENE GLYCOL 3350 17 GRAM(S): 17 POWDER, FOR SOLUTION ORAL at 05:00

## 2022-02-07 RX ADMIN — TAMSULOSIN HYDROCHLORIDE 0.4 MILLIGRAM(S): 0.4 CAPSULE ORAL at 22:12

## 2022-02-07 RX ADMIN — Medication 25 MICROGRAM(S): at 05:02

## 2022-02-07 RX ADMIN — Medication 81 MILLIGRAM(S): at 11:02

## 2022-02-07 RX ADMIN — Medication 1 DROP(S): at 00:15

## 2022-02-07 RX ADMIN — RISPERIDONE 0.25 MILLIGRAM(S): 4 TABLET ORAL at 17:05

## 2022-02-07 RX ADMIN — SODIUM CHLORIDE 60 MILLILITER(S): 9 INJECTION, SOLUTION INTRAVENOUS at 14:16

## 2022-02-07 RX ADMIN — Medication 975 MILLIGRAM(S): at 23:00

## 2022-02-07 RX ADMIN — TRAMADOL HYDROCHLORIDE 25 MILLIGRAM(S): 50 TABLET ORAL at 03:15

## 2022-02-07 RX ADMIN — Medication 20 MILLIEQUIVALENT(S): at 18:48

## 2022-02-07 RX ADMIN — Medication 3 MILLIGRAM(S): at 22:13

## 2022-02-07 RX ADMIN — ATORVASTATIN CALCIUM 20 MILLIGRAM(S): 80 TABLET, FILM COATED ORAL at 22:12

## 2022-02-07 RX ADMIN — Medication 1 DROP(S): at 11:02

## 2022-02-07 NOTE — PROGRESS NOTE ADULT - ASSESSMENT
89 yr old female with dementia, hypothyroidism, TIA presented after a fall, imaging revealed an acute left intertrochanteric fracture, labs with WBC 18 on admission. She is s/p IM nail of left hip. Post operative course was complicated by anemia, Hb 7.4. PRBC transfusion was given. Also developed urinary retention requiring Stover placement. She developed agitation and pulled out Stover, required constant observation. Her Hb did not improve, was 7.4 on 2/3, hence additional PRBC ordered and CT abdomen/pelvis with hip hematoma.    1. Acute comminuted intertrochanteric fracture sec to fall   s/p IM Nails  for [facility] however on 1 :1 due to confusion safety can not discharge   c/w tylenol for pain   will stop narcotics in this elderly pt   cont pt/ot daily as able     2.  Dementia now with delirium  better still episode of agitation   will conrinue 1 :1   add risperidone   use seroquel PRN     3. hx TIA:   On low dose aspirin and statin    4.Hypothyroidism: Continue Synthroid    5- Severe protein manjeet mlanutrition   cont diet and ensure   add MVI   check vit levels vit d , folate vit b12   5. DVT prophylaxis:  on Lovenox     6. Leucocytosis resolved:  Continue to monitor.    7. Postoperative anemia due to blood loss   stable; post transfusions x2  CT a/p with hip hematoma.    8. Urinary retention  c/w  stover and flomax  next voiding try when mobile or less delirious     DVT ppx - SCD for now, if h/h stable may start sq heparin  Code/social - full code for now, Navya, cell: [phone] updated at bed side   89 yr old female with dementia, hypothyroidism, TIA presented after a fall, imaging revealed an acute left intertrochanteric fracture, labs with WBC 18 on admission. She is s/p IM nail of left hip. Post operative course was complicated by anemia, Hb 7.4. PRBC transfusion was given. Also developed urinary retention requiring Stover placement. She developed agitation and pulled out Stover, required constant observation. Her Hb did not improve, was 7.4 on 2/3, hence additional PRBC ordered and CT abdomen/pelvis with hip hematoma.    1. Acute comminuted intertrochanteric fracture sec to fall   s/p IM Nails  for [facility] however on 1 :1 due to confusion safety can not discharge   c/w tylenol for pain   will stop narcotics in this elderly pt   cont pt/ot daily as able     2.  Dementia now with delirium  better still episode of agitation   will conrinue 1 :1   add risperidone   use seroquel PRN     3- Urinary retention stover in place   cont void trial removal once more ambulatory   cont flomax daily     4. hx TIA:   On low dose aspirin and statin    5-.Hypothyroidism: Continue Synthroid    6-- Severe protein manjeet mlanutrition   cont diet and ensure   add MVI   check vit levels vit d , folate vit b12     7- Anemia post op   acute on cronic   will check iron studies , vit b12 , folate levels   add folic acid   may need iron iv or PO pending labs in am       DVT ppx - if h/h stable, on heparin  Code/social - full code for nowNavya, cell: [phone]

## 2022-02-07 NOTE — PROGRESS NOTE ADULT - SUBJECTIVE AND OBJECTIVE BOX
Patient is a 89y old  Female who presents with a chief complaint of     Patient seen and examined at bedside. No overnight events reported.     ALLERGIES:  No Known Allergies    MEDICATIONS  (STANDING):  acetaminophen     Tablet .. 975 milliGRAM(s) Oral every 8 hours  artificial  tears Solution 1 Drop(s) Right EYE every 6 hours  aspirin  chewable 81 milliGRAM(s) Oral daily  atorvastatin 20 milliGRAM(s) Oral at bedtime  enoxaparin Injectable 40 milliGRAM(s) SubCutaneous daily  levothyroxine 25 MICROGram(s) Oral daily  melatonin 3 milliGRAM(s) Oral at bedtime  polyethylene glycol 3350 17 Gram(s) Oral two times a day  risperiDONE   Tablet 0.25 milliGRAM(s) Oral two times a day  senna 2 Tablet(s) Oral at bedtime  tamsulosin 0.4 milliGRAM(s) Oral at bedtime  tranexamic acid IVPB 1000 milliGRAM(s) IV Intermittent once    MEDICATIONS  (PRN):  QUEtiapine 12.5 milliGRAM(s) Oral every 8 hours PRN restlessness    Vital Signs Last 24 Hrs  T(F): 98.2 (07 Feb 2022 09:10), Max: 98.4 (07 Feb 2022 05:01)  HR: 81 (07 Feb 2022 09:10) (79 - 100)  BP: 105/59 (07 Feb 2022 09:10) (105/59 - 148/72)  RR: 18 (07 Feb 2022 09:10) (18 - 18)  SpO2: 91% (07 Feb 2022 09:10) (91% - 94%)  I&O's Summary    06 Feb 2022 07:01  -  07 Feb 2022 07:00  --------------------------------------------------------  IN: 0 mL / OUT: 1500 mL / NET: -1500 mL    07 Feb 2022 07:01  -  07 Feb 2022 13:15  --------------------------------------------------------  IN: 240 mL / OUT: 0 mL / NET: 240 mL        PHYSICAL EXAM:  General: awake confused , cooperative   Neck: supple   Lungs: CTA bilateral   Cardio: RRR, S1/S2, No murmur  Abdomen: Soft, Nontender, Nondistended; Bowel sounds present  Extremities: No calf tenderness, No pitting edema  lft hip skin surgery wound site dressing in place , no skin redness    : has stover         LABS:                        8.4    6.38  )-----------( 221      ( 07 Feb 2022 06:01 )             25.7     02-07    140  |  110  |  21.8  ----------------------------<  106  3.5   |  20.0  |  0.68    Ca    7.7      07 Feb 2022 06:01          eGFR if Non African American: 76 mL/min/1.73M2 (02-07-22 @ 06:01)  eGFR if : 88 mL/min/1.73M2 (02-07-22 @ 06:01)                                    COVID-19 PCR: NotDetec (02-01-22 @ 08:09)    RADIOLOGY & ADDITIONAL TESTS:

## 2022-02-08 LAB
24R-OH-CALCIDIOL SERPL-MCNC: 19.4 NG/ML — LOW (ref 30–80)
FERRITIN SERPL-MCNC: 253 NG/ML — HIGH (ref 15–150)
IRON SATN MFR SERPL: 17 % — SIGNIFICANT CHANGE UP (ref 14–50)
IRON SATN MFR SERPL: 41 UG/DL — SIGNIFICANT CHANGE UP (ref 37–145)
SARS-COV-2 RNA SPEC QL NAA+PROBE: SIGNIFICANT CHANGE UP
TIBC SERPL-MCNC: 247 UG/DL — SIGNIFICANT CHANGE UP (ref 220–430)
TRANSFERRIN SERPL-MCNC: 173 MG/DL — LOW (ref 192–382)
VIT B12 SERPL-MCNC: 418 PG/ML — SIGNIFICANT CHANGE UP (ref 232–1245)

## 2022-02-08 PROCEDURE — 99233 SBSQ HOSP IP/OBS HIGH 50: CPT

## 2022-02-08 PROCEDURE — 99232 SBSQ HOSP IP/OBS MODERATE 35: CPT

## 2022-02-08 RX ORDER — FERROUS SULFATE 325(65) MG
325 TABLET ORAL DAILY
Refills: 0 | Status: ACTIVE | OUTPATIENT
Start: 2022-02-08 | End: 2023-01-07

## 2022-02-08 RX ORDER — CELECOXIB 200 MG/1
200 CAPSULE ORAL DAILY
Refills: 0 | Status: ACTIVE | OUTPATIENT
Start: 2022-02-08 | End: 2023-01-07

## 2022-02-08 RX ORDER — FENTANYL CITRATE 50 UG/ML
1 INJECTION INTRAVENOUS
Refills: 0 | Status: DISCONTINUED | OUTPATIENT
Start: 2022-02-08 | End: 2022-02-14

## 2022-02-08 RX ORDER — ERGOCALCIFEROL 1.25 MG/1
50000 CAPSULE ORAL
Refills: 0 | Status: ACTIVE | OUTPATIENT
Start: 2022-02-08 | End: 2023-01-07

## 2022-02-08 RX ADMIN — RISPERIDONE 0.25 MILLIGRAM(S): 4 TABLET ORAL at 17:05

## 2022-02-08 RX ADMIN — Medication 500 MILLIGRAM(S): at 17:05

## 2022-02-08 RX ADMIN — Medication 500 MILLIGRAM(S): at 05:43

## 2022-02-08 RX ADMIN — QUETIAPINE FUMARATE 12.5 MILLIGRAM(S): 200 TABLET, FILM COATED ORAL at 19:57

## 2022-02-08 RX ADMIN — Medication 975 MILLIGRAM(S): at 21:23

## 2022-02-08 RX ADMIN — Medication 975 MILLIGRAM(S): at 13:34

## 2022-02-08 RX ADMIN — SENNA PLUS 2 TABLET(S): 8.6 TABLET ORAL at 21:23

## 2022-02-08 RX ADMIN — Medication 975 MILLIGRAM(S): at 22:00

## 2022-02-08 RX ADMIN — FENTANYL CITRATE 1 PATCH: 50 INJECTION INTRAVENOUS at 19:00

## 2022-02-08 RX ADMIN — TAMSULOSIN HYDROCHLORIDE 0.4 MILLIGRAM(S): 0.4 CAPSULE ORAL at 21:23

## 2022-02-08 RX ADMIN — Medication 1 DROP(S): at 13:35

## 2022-02-08 RX ADMIN — Medication 975 MILLIGRAM(S): at 06:12

## 2022-02-08 RX ADMIN — Medication 1 DROP(S): at 05:46

## 2022-02-08 RX ADMIN — Medication 25 MICROGRAM(S): at 05:43

## 2022-02-08 RX ADMIN — Medication 1 DROP(S): at 17:05

## 2022-02-08 RX ADMIN — ATORVASTATIN CALCIUM 20 MILLIGRAM(S): 80 TABLET, FILM COATED ORAL at 21:23

## 2022-02-08 RX ADMIN — Medication 3 MILLIGRAM(S): at 21:23

## 2022-02-08 RX ADMIN — POLYETHYLENE GLYCOL 3350 17 GRAM(S): 17 POWDER, FOR SOLUTION ORAL at 05:42

## 2022-02-08 RX ADMIN — Medication 975 MILLIGRAM(S): at 14:30

## 2022-02-08 RX ADMIN — POLYETHYLENE GLYCOL 3350 17 GRAM(S): 17 POWDER, FOR SOLUTION ORAL at 17:05

## 2022-02-08 RX ADMIN — ENOXAPARIN SODIUM 40 MILLIGRAM(S): 100 INJECTION SUBCUTANEOUS at 13:34

## 2022-02-08 RX ADMIN — Medication 325 MILLIGRAM(S): at 17:07

## 2022-02-08 RX ADMIN — CELECOXIB 200 MILLIGRAM(S): 200 CAPSULE ORAL at 14:30

## 2022-02-08 RX ADMIN — ERGOCALCIFEROL 50000 UNIT(S): 1.25 CAPSULE ORAL at 17:05

## 2022-02-08 RX ADMIN — FENTANYL CITRATE 1 PATCH: 50 INJECTION INTRAVENOUS at 13:34

## 2022-02-08 RX ADMIN — Medication 975 MILLIGRAM(S): at 05:42

## 2022-02-08 RX ADMIN — Medication 81 MILLIGRAM(S): at 13:34

## 2022-02-08 RX ADMIN — CELECOXIB 200 MILLIGRAM(S): 200 CAPSULE ORAL at 13:34

## 2022-02-08 RX ADMIN — RISPERIDONE 0.25 MILLIGRAM(S): 4 TABLET ORAL at 05:43

## 2022-02-08 NOTE — PROGRESS NOTE ADULT - ASSESSMENT
89 yr old female with dementia, hypothyroidism, TIA presented after a fall, imaging revealed an acute left intertrochanteric fracture, labs with WBC 18 on admission. She is s/p IM nail of left hip. Post operative course was complicated by anemia, Hb 7.4. PRBC transfusion was given. Also developed urinary retention requiring Stover placement. She developed agitation and pulled out Stover, required constant observation. Her Hb did not improve, was 7.4 on 2/3, hence additional PRBC ordered and CT abdomen/pelvis with hip hematoma.    1. Acute comminuted intertrochanteric fracture sec to fall   s/p IM Nails  for [facility] however on 1 :1 due to confusion safety   c/w tylenol for pain   palliative care on board   cont pt/ot daily as able     2.  Dementia  with delirium  better still episode of agitation pulling on lines and stover   will continue 1 :1   added risperidone , cont seroquel prn   add patch for pain     3- Urinary retention stover in place   cont void trial removal once more ambulatory  cont flomax     4- Vit d deficinecy   add ergocalciferol 14765 unit weekly     5-hx TIA:   On low dose aspirin and statin    6-Hypothyroidism: Continue Synthroid    7-- Severe protein manjeet mlanutrition   cont diet and ensure   MVI with minerals     8- Anemia post op   iron studies reviewed   add ferrous sulfate vit c daily       DVT ppx - if h/h stable, on heparin   Code/social - MOLST completed DNR   niece HCP is Navya, cell: [phone]

## 2022-02-08 NOTE — PROGRESS NOTE ADULT - SUBJECTIVE AND OBJECTIVE BOX
Patient is a 89y old  Female with left hip fracture s/p surgery   confused , on 1 :1   pulling on her stover earlier today per nursing , tried to remove constant observation     will resume for safety now   pt is awake alert denies any pain , confused hard of hearing       ALLERGIES:  No Known Allergies    MEDICATIONS  (STANDING):  acetaminophen     Tablet .. 975 milliGRAM(s) Oral every 8 hours  artificial  tears Solution 1 Drop(s) Right EYE every 6 hours  aspirin  chewable 81 milliGRAM(s) Oral daily  atorvastatin 20 milliGRAM(s) Oral at bedtime  enoxaparin Injectable 40 milliGRAM(s) SubCutaneous daily  levothyroxine 25 MICROGram(s) Oral daily  melatonin 3 milliGRAM(s) Oral at bedtime  polyethylene glycol 3350 17 Gram(s) Oral two times a day  risperiDONE   Tablet 0.25 milliGRAM(s) Oral two times a day  senna 2 Tablet(s) Oral at bedtime  tamsulosin 0.4 milliGRAM(s) Oral at bedtime  tranexamic acid IVPB 1000 milliGRAM(s) IV Intermittent once    MEDICATIONS  (PRN):  QUEtiapine 12.5 milliGRAM(s) Oral every 8 hours PRN restlessness    Vital Signs Last 24 Hrs  T(C): 36.8 (08 Feb 2022 07:54), Max: 37 (07 Feb 2022 21:13)  T(F): 98.3 (08 Feb 2022 07:54), Max: 98.6 (07 Feb 2022 21:13)  HR: 69 (08 Feb 2022 07:54) (69 - 117)  BP: 132/69 (08 Feb 2022 07:54) (132/69 - 155/76)  BP(mean): --  RR: 18 (08 Feb 2022 07:54) (18 - 20)  SpO2: 92% (08 Feb 2022 07:54) (92% - 93%)      PHYSICAL EXAM:  General: awake confused , cooperative   Lungs: CTA bilateral   Cardio: RRR, S1/S2, No murmur  Abdomen: Soft, Nontender, Nondistended; Bowel sounds present  Extremities: No calf tenderness, No pitting edema  lft hip skin surgery wound site dressing in place , no skin redness    : +  stover                             8.4    6.38  )-----------( 221      ( 07 Feb 2022 06:01 )             25.7   02-07    140  |  110<H>  |  21.8<H>  ----------------------------<  106<H>  3.5   |  20.0<L>  |  0.68    Ca    7.7<L>      07 Feb 2022 06:01    eGFR if Non African American: 76 mL/min/1.73M2 (02-07-22 @ 06:01)  eGFR if : 88 mL/min/1.73M2 (02-07-22 @ 06:01)

## 2022-02-08 NOTE — PROGRESS NOTE ADULT - ASSESSMENT
This is a 89 yo F frail elderly S/P Fall then repair of L Hip Fx IM Nailing WITH Persistent confusion and delirium    AMS  Delirium in setting of Dementia  Restless agitated pulling out lines and stover catheter  Very confused short term memory loss- not easily reoriented  Treat underlying cause:  Untreated Pain-Pt is denying she has pain, then upon further assessment, admits she does have pain in her L hip and hot  POST-OP Anemia due to blood loss- follow H/H stable at present   Risperdone 0.25mg 2x/d  would increase to 0.5mg BID  Seroquel available prn    L Hip Fx S/P Hip Nailing POD #6  Hematoma swelling and inflammation  Delaying recovery and increasing mobility  Not able to follow commands  Confused-needs 1:1 supervision    Acute Pain  Poor self advocate, Cognitively impaired-cannot self report her pain  Already on Tylenol 975mg Q8 ATC  Not using Motrin-DC'd to avoid platelet activity  Adding Fentanyl 12 mcg/hr patch for long acting pain relief  Celebrex 200/d (no platelet acitvity)  Cold pack to hip  Bowel regime to avoid constipation    GOC   HCP Navya spoke with Dr. Boykin yesterday  DANGELO in chart from earlier in hospitalization  DANGELO needs clarification regarding DNR/DNI-as Osiris wants a trial of BIPAP if necessary  I will call Gabe later this afternoon to discuss further       This is a 89 yo F frail elderly S/P Fall then repair of L Hip Fx IM Nailing WITH Persistent confusion and delirium    AMS  Delirium in setting of Dementia  Restless agitated pulling out lines and stover catheter  Very confused short term memory loss- not easily reoriented  Treat underlying cause:  Untreated Pain-Pt is denying she has pain, then upon further assessment, admits she does have pain in her L hip and hot  POST-OP Anemia due to blood loss- follow H/H stable at present   Risperdone 0.25mg 2x/d  would increase to 0.5mg BID  Seroquel available prn    L Hip Fx S/P Hip Nailing POD #6  Hematoma swelling and inflammation  Delaying recovery and increasing mobility  Not able to follow commands  Confused-needs 1:1 supervision    Acute Pain  Poor self advocate, Cognitively impaired-cannot self report her pain  Already on Tylenol 975mg Q8 ATC  Not using Motrin-DC'd to avoid platelet activity  Adding Fentanyl 12 mcg/hr patch for long acting pain relief  Celebrex 200/d (no platelet acitvity)  Cold pack to hip  Bowel regime to avoid constipation    GOC   HCP Navya spoke with Dr. Boykin yesterday  DANGELO in chart from earlier in hospitalization  DANGELO needs clarification regarding DNR/DNI-as Osiris wants a trial of BIPAP if necessary  Itried to  Gabe Mendosa later  this afternoon, no answer or ability to lewave a message  I texted her opened up conversation about how to think about DNI if going for trial of NIVVM   Will F/U

## 2022-02-08 NOTE — PROGRESS NOTE ADULT - SUBJECTIVE AND OBJECTIVE BOX
OVERNIGHT EVENTS:  F/U Note:    Patient remains very confused and restless  Fixated on getting up, aware of Stover catheter in place, determined to pull it out  Short term memory-reoriented, not able to hold onto anything memory wise  pulling out lines and Stover catheter twice-Risperdone 0.25mg BID on board  Initially reports she has no pain; upon further discussion, she did pull up her gown, and patted her L lateral thigh, saying "this is where it hurts me"  also bringing to my attention that it is swollen and "Hot"    Present Symptoms:     Dyspnea: none  Nausea/Vomiting:  No  Anxiety:  Yes   Depression: No  Fatigue: Yes   Loss of appetite:  No  Constipation:     Pain: L Post-op hip pain, complicated by increased swelling and hematoma            Character-            Duration-            Effect-            Factors-            Frequency-            Location-            Severity-moderate to severe    Pain AD Score:  6-7/10    Review of Systems: Reviewed                   Unable to obtain due to poor mentation       MEDICATIONS  (STANDING):  acetaminophen     Tablet .. 975 milliGRAM(s) Oral every 8 hours  artificial  tears Solution 1 Drop(s) Right EYE every 6 hours  ascorbic acid 500 milliGRAM(s) Oral two times a day  aspirin  chewable 81 milliGRAM(s) Oral daily  atorvastatin 20 milliGRAM(s) Oral at bedtime  celecoxib 200 milliGRAM(s) Oral daily  enoxaparin Injectable 40 milliGRAM(s) SubCutaneous daily  fentaNYL   Patch  12 MICROgram(s)/Hr 1 Patch Transdermal every 72 hours  levothyroxine 25 MICROGram(s) Oral daily  melatonin 3 milliGRAM(s) Oral at bedtime  polyethylene glycol 3350 17 Gram(s) Oral two times a day  risperiDONE   Tablet 0.25 milliGRAM(s) Oral two times a day  senna 2 Tablet(s) Oral at bedtime  tamsulosin 0.4 milliGRAM(s) Oral at bedtime  tranexamic acid IVPB 1000 milliGRAM(s) IV Intermittent once    MEDICATIONS  (PRN):  QUEtiapine 12.5 milliGRAM(s) Oral every 8 hours PRN restlessness    PHYSICAL EXAM:    Vital Signs Last 24 Hrs  T(C): 36.8 (08 Feb 2022 07:54), Max: 37 (07 Feb 2022 21:13)  T(F): 98.3 (08 Feb 2022 07:54), Max: 98.6 (07 Feb 2022 21:13)  HR: 69 (08 Feb 2022 07:54) (69 - 117)  BP: 132/69 (08 Feb 2022 07:54) (132/69 - 155/76)  BP(mean): --  RR: 18 (08 Feb 2022 07:54) (18 - 20)  SpO2: 92% (08 Feb 2022 07:54) (92% - 93%)    General: alert restless, agitated____ oriented to self                  cachexia  verbal      Karnofsky:  30%    HEENT:l  dry mouth     Lungs: comfortable     CV: normal      GI: normal             constipation  last BM:     :  incontinent   stover    MSK:  weakness  edema LLE hip thigh             bedbound/wheelchair bound    Skin:L Hip and thigh hematoma_  no rash    LABS:                          8.4    6.38  )-----------( 221      ( 07 Feb 2022 06:01 )             25.7     02-07    140  |  110<H>  |  21.8<H>  ----------------------------<  106<H>  3.5   |  20.0<L>  |  0.68    Ca    7.7<L>      07 Feb 2022 06:01    I&O's Summary    07 Feb 2022 07:01  -  08 Feb 2022 07:00  --------------------------------------------------------  IN: 600 mL / OUT: 500 mL / NET: 100 mL    RADIOLOGY & ADDITIONAL STUDIES:  < from: Xray Hip 2-3 Views, Left (02.01.22 @ 08:56) >    IMPRESSION: Left hip fracture. Small irregular density right base.   Consider follow-up. Old fracture left tissue tuberosity.    --- End of Report ---    < end of copied text >      ADVANCE DIRECTIVES/TREATMENT PREFERENCES:  DNR YES  Completed on:                     MOLST  YES NO   Completed on: 2/7  Living Will  NO   Completed on:

## 2022-02-09 LAB
ANION GAP SERPL CALC-SCNC: 11 MMOL/L — SIGNIFICANT CHANGE UP (ref 5–17)
BUN SERPL-MCNC: 26.5 MG/DL — HIGH (ref 8–20)
CALCIUM SERPL-MCNC: 8.5 MG/DL — LOW (ref 8.6–10.2)
CHLORIDE SERPL-SCNC: 106 MMOL/L — SIGNIFICANT CHANGE UP (ref 98–107)
CO2 SERPL-SCNC: 23 MMOL/L — SIGNIFICANT CHANGE UP (ref 22–29)
CREAT SERPL-MCNC: 0.67 MG/DL — SIGNIFICANT CHANGE UP (ref 0.5–1.3)
GLUCOSE SERPL-MCNC: 121 MG/DL — HIGH (ref 70–99)
HCT VFR BLD CALC: 27.9 % — LOW (ref 34.5–45)
HGB BLD-MCNC: 9 G/DL — LOW (ref 11.5–15.5)
MCHC RBC-ENTMCNC: 29.4 PG — SIGNIFICANT CHANGE UP (ref 27–34)
MCHC RBC-ENTMCNC: 32.3 GM/DL — SIGNIFICANT CHANGE UP (ref 32–36)
MCV RBC AUTO: 91.2 FL — SIGNIFICANT CHANGE UP (ref 80–100)
PHOSPHATE SERPL-MCNC: 3.4 MG/DL — SIGNIFICANT CHANGE UP (ref 2.4–4.7)
PLATELET # BLD AUTO: 310 K/UL — SIGNIFICANT CHANGE UP (ref 150–400)
POTASSIUM SERPL-MCNC: 4.3 MMOL/L — SIGNIFICANT CHANGE UP (ref 3.5–5.3)
POTASSIUM SERPL-SCNC: 4.3 MMOL/L — SIGNIFICANT CHANGE UP (ref 3.5–5.3)
RBC # BLD: 3.06 M/UL — LOW (ref 3.8–5.2)
RBC # FLD: 15.3 % — HIGH (ref 10.3–14.5)
SODIUM SERPL-SCNC: 140 MMOL/L — SIGNIFICANT CHANGE UP (ref 135–145)
TSH SERPL-MCNC: 10.87 UIU/ML — HIGH (ref 0.27–4.2)
VIT B12 SERPL-MCNC: 497 PG/ML — SIGNIFICANT CHANGE UP (ref 232–1245)
WBC # BLD: 9.93 K/UL — SIGNIFICANT CHANGE UP (ref 3.8–10.5)
WBC # FLD AUTO: 9.93 K/UL — SIGNIFICANT CHANGE UP (ref 3.8–10.5)

## 2022-02-09 PROCEDURE — 99233 SBSQ HOSP IP/OBS HIGH 50: CPT

## 2022-02-09 PROCEDURE — 99232 SBSQ HOSP IP/OBS MODERATE 35: CPT

## 2022-02-09 PROCEDURE — 74018 RADEX ABDOMEN 1 VIEW: CPT | Mod: 26

## 2022-02-09 RX ORDER — LACTULOSE 10 G/15ML
20 SOLUTION ORAL ONCE
Refills: 0 | Status: COMPLETED | OUTPATIENT
Start: 2022-02-09 | End: 2022-02-09

## 2022-02-09 RX ORDER — RISPERIDONE 4 MG/1
0.25 TABLET ORAL
Refills: 0 | Status: DISCONTINUED | OUTPATIENT
Start: 2022-02-09 | End: 2022-02-10

## 2022-02-09 RX ORDER — RISPERIDONE 4 MG/1
0.5 TABLET ORAL AT BEDTIME
Refills: 0 | Status: ACTIVE | OUTPATIENT
Start: 2022-02-09 | End: 2023-01-08

## 2022-02-09 RX ORDER — OLANZAPINE 15 MG/1
2.5 TABLET, FILM COATED ORAL ONCE
Refills: 0 | Status: COMPLETED | OUTPATIENT
Start: 2022-02-09 | End: 2022-02-09

## 2022-02-09 RX ADMIN — FENTANYL CITRATE 1 PATCH: 50 INJECTION INTRAVENOUS at 19:00

## 2022-02-09 RX ADMIN — CELECOXIB 200 MILLIGRAM(S): 200 CAPSULE ORAL at 11:12

## 2022-02-09 RX ADMIN — Medication 1 DROP(S): at 11:13

## 2022-02-09 RX ADMIN — Medication 975 MILLIGRAM(S): at 05:20

## 2022-02-09 RX ADMIN — OLANZAPINE 2.5 MILLIGRAM(S): 15 TABLET, FILM COATED ORAL at 01:41

## 2022-02-09 RX ADMIN — POLYETHYLENE GLYCOL 3350 17 GRAM(S): 17 POWDER, FOR SOLUTION ORAL at 05:20

## 2022-02-09 RX ADMIN — LACTULOSE 20 GRAM(S): 10 SOLUTION ORAL at 11:12

## 2022-02-09 RX ADMIN — Medication 25 MICROGRAM(S): at 05:20

## 2022-02-09 RX ADMIN — Medication 81 MILLIGRAM(S): at 11:12

## 2022-02-09 RX ADMIN — Medication 3 MILLIGRAM(S): at 22:40

## 2022-02-09 RX ADMIN — TAMSULOSIN HYDROCHLORIDE 0.4 MILLIGRAM(S): 0.4 CAPSULE ORAL at 22:39

## 2022-02-09 RX ADMIN — SENNA PLUS 2 TABLET(S): 8.6 TABLET ORAL at 22:39

## 2022-02-09 RX ADMIN — RISPERIDONE 0.25 MILLIGRAM(S): 4 TABLET ORAL at 05:21

## 2022-02-09 RX ADMIN — Medication 975 MILLIGRAM(S): at 05:28

## 2022-02-09 RX ADMIN — POLYETHYLENE GLYCOL 3350 17 GRAM(S): 17 POWDER, FOR SOLUTION ORAL at 17:54

## 2022-02-09 RX ADMIN — QUETIAPINE FUMARATE 12.5 MILLIGRAM(S): 200 TABLET, FILM COATED ORAL at 22:39

## 2022-02-09 RX ADMIN — ATORVASTATIN CALCIUM 20 MILLIGRAM(S): 80 TABLET, FILM COATED ORAL at 22:39

## 2022-02-09 RX ADMIN — QUETIAPINE FUMARATE 12.5 MILLIGRAM(S): 200 TABLET, FILM COATED ORAL at 11:12

## 2022-02-09 RX ADMIN — Medication 975 MILLIGRAM(S): at 23:00

## 2022-02-09 RX ADMIN — Medication 500 MILLIGRAM(S): at 05:21

## 2022-02-09 RX ADMIN — Medication 500 MILLIGRAM(S): at 17:54

## 2022-02-09 RX ADMIN — CELECOXIB 200 MILLIGRAM(S): 200 CAPSULE ORAL at 12:12

## 2022-02-09 RX ADMIN — RISPERIDONE 0.5 MILLIGRAM(S): 4 TABLET ORAL at 22:40

## 2022-02-09 RX ADMIN — Medication 975 MILLIGRAM(S): at 22:39

## 2022-02-09 RX ADMIN — ENOXAPARIN SODIUM 40 MILLIGRAM(S): 100 INJECTION SUBCUTANEOUS at 11:12

## 2022-02-09 RX ADMIN — Medication 325 MILLIGRAM(S): at 11:12

## 2022-02-09 NOTE — CHART NOTE - NSCHARTNOTEFT_GEN_A_CORE
Source: Patient [x ]  Family [x ]   other [ x]    89 yr old female with dementia, hypothyroidism, TIA presented after a fall, imaging revealed an acute left intertrochanteric fracture, labs with WBC 18 on admission. She is s/p IM nail of left hip. Post operative course was complicated by anemia, Hb 7.4. PRBC transfusion was given. Also developed urinary retention requiring Brenner placement. She developed agitation and pulled out Brenner, required constant observation. Her Hb did not improve, was 7.4 on 2/3, hence additional PRBC ordered and CT abdomen/pelvis with hip hematoma.   Acute comminuted intertrochanteric fracture sec to fall     Current Diet: Diet, Regular:   Supplement Feeding Modality:  Oral  Ensure Clear Cans or Servings Per Day:  3       Frequency:  Three Times a day (02-03-22 @ 16:39)      Patient reports [ ] nausea  [ ] vomiting [ ] diarrhea [ ] constipation  [ ]chewing problems [ ] swallowing issues  [ ] other:     PO intake:  < 50% [ x]   50-75%  [ ]   %  [ ]  other :    Source for PO intake [ ] Patient [x ] family [ ] chart [x ] staff [ ] other        Current Weight:   2/9 64.9 kg  2/2 57.9 kg  2/1 49.9 kg    % Weight Change   1+ edema to left hip    Pertinent Medications: MEDICATIONS  (STANDING):  acetaminophen     Tablet .. 975 milliGRAM(s) Oral every 8 hours  artificial  tears Solution 1 Drop(s) Right EYE every 6 hours  ascorbic acid 500 milliGRAM(s) Oral two times a day  aspirin  chewable 81 milliGRAM(s) Oral daily  atorvastatin 20 milliGRAM(s) Oral at bedtime  celecoxib 200 milliGRAM(s) Oral daily  enoxaparin Injectable 40 milliGRAM(s) SubCutaneous daily  ergocalciferol 67726 Unit(s) Oral <User Schedule>  fentaNYL   Patch  12 MICROgram(s)/Hr 1 Patch Transdermal every 72 hours  ferrous    sulfate 325 milliGRAM(s) Oral daily  lactulose Syrup 20 Gram(s) Oral once  levothyroxine 25 MICROGram(s) Oral daily  melatonin 3 milliGRAM(s) Oral at bedtime  polyethylene glycol 3350 17 Gram(s) Oral two times a day  risperiDONE   Tablet 0.25 milliGRAM(s) Oral two times a day  senna 2 Tablet(s) Oral at bedtime  tamsulosin 0.4 milliGRAM(s) Oral at bedtime  tranexamic acid IVPB 1000 milliGRAM(s) IV Intermittent once    MEDICATIONS  (PRN):  QUEtiapine 12.5 milliGRAM(s) Oral every 8 hours PRN restlessness    Pertinent Labs: CBC Full  -  ( 09 Feb 2022 05:26 )  WBC Count : 9.93 K/uL  RBC Count : 3.06 M/uL  Hemoglobin : 9.0 g/dL  Hematocrit : 27.9 %  Platelet Count - Automated : 310 K/uL  Mean Cell Volume : 91.2 fl  Mean Cell Hemoglobin : 29.4 pg  Mean Cell Hemoglobin Concentration : 32.3 gm/dL  Auto Neutrophil # : x  Auto Lymphocyte # : x  Auto Monocyte # : x  Auto Eosinophil # : x  Auto Basophil # : x  Auto Neutrophil % : x  Auto Lymphocyte % : x  Auto Monocyte % : x  Auto Eosinophil % : x  Auto Basophil % : x    02-09 Na140 mmol/L Glu 121 mg/dL<H> K+ 4.3 mmol/L Cr  0.67 mg/dL BUN 26.5 mg/dL<H> Phos 3.4 mg/dL Alb n/a   PAB n/a             Skin: no breakdown noted    Nutrition focused physical exam conducted - found signs of malnutrition [ ]absent [ x]present    Subcutaneous fat loss: [x ] Orbital fat pads region, [x ]Buccal fat region, [ ]Triceps region,  [ ]Ribs region    Muscle wasting: [x ]Temples region, [x ]Clavicle region, [ ]Shoulder region, [ ]Scapula region, [ ]Interosseous region,  [ ]thigh region, [ ]Calf region    Estimated Needs:   [x ] no change since previous assessment  [ ] recalculated:     Current Nutrition Diagnosis: Pt presents ar risk secondary to Malnutrition severe acute, related to inadequate protein calorie intake in the setting of advanced dementia, as evidenced by PO<50% est needs and physical findings.     Recommendations:   1) Family requesting minced and moist diet  2) change ensure clear to ensure enlive (vanilla)  3)RX MVI   4) Swallow eval?    Monitoring and Evaluation:   [ x] PO intake [x ] Tolerance to diet prescription [X] Weights  [X] Follow up per protocol [X] Labs:

## 2022-02-09 NOTE — PROGRESS NOTE ADULT - ASSESSMENT
89 yo admitted s/p fall sustaining left hip fracture s/p repair with IM nailing complicated by hyperactive delirium with agitation.    PLAN    Hyperactive Delirium  Known Underlying Dementia  - remains on 1:1 with intermittent agitation and frequent reorientation  - multifactorial etiologies including dementia, hospitalization, pain related  - currently on risperidone 0.25mg BID and PRN quetiapine   - Recommend increasing evening risperidone to 0.5mg at bedtime    Acute Pain  - on Fentanyl patch 12mcg + ATC celecoxib (both started on 2/8)  - c/w APAP 975mg ATC  - c/w bowel regimen for constipation prophylaxis    S/P Fall with Lt Hip Fracture  - s/p IM nailing 2/1 by ortho  - pain control, PT/OT    Advance Care Planning  Palliative Care Encounter  - HCP Nijeet Mendosa    - DNR with trial of NIV, MOLST completed by hospitalist 2/7    D/W Hospitalist, niece wishes to continue with current medical mgnt and for A[facility]ON once medically optimized. Given patient's advance dementia, would benefit from hospice services vs home care/palliative based program after discharge from [facility]. Prognosis guarded and high risk of rehospitalization given comorbidities, progressive debility, frailty.

## 2022-02-09 NOTE — PROGRESS NOTE ADULT - ASSESSMENT
89 yr old female with dementia, hypothyroidism, TIA presented after a fall, imaging revealed an acute left intertrochanteric fracture, labs with WBC 18 on admission. She is s/p IM nail of left hip. Post operative course was complicated by anemia, Hb 7.4. PRBC transfusion was given. Also developed urinary retention requiring Stover placement. She developed agitation and pulled out Stover, required constant observation. Her Hb did not improve, was 7.4 on 2/3, hence additional PRBC ordered and CT abdomen/pelvis with hip hematoma.    1. Acute comminuted intertrochanteric fracture due to fall   s/p IM Nails on feb 1   for [facility] however on 1 :1 due to confusion , pulling on stover and lines    palliative care team follow up appreciated ; pain meds adjusted added low dose fentanyl ,  cont pt/ot daily as able     2.  Dementia  with delirium  with intermittent  episode of agitation , impulsive behaviours   pulling on lines and stover   will continue 1 :1   cont risperidone , seroquel prn   pain meds      3- Urinary retention stover in place   cont void trial removal once more ambulatory  cont flomax     4- Vit d deficiency    started ergocalciferol 51782 unit weekly     5- Constipation sucpected   KUB ordered   cont miralxa senna   will give lactulose if still no BM by evening add enema     6-hx TIA:   On aspirin and statin    7-Hypothyroidism: Continue Synthroid    8--- Severe protein manjeet mlanutrition   cont diet and ensure   MVI with minerals     9- Anemia post op   iron studies reviewed   cont  ferrous sulfate vit c daily   Hb stable       DVT ppx - on heparin     Code/social - MOLST completed DNR   niece is the HCP Navya, cell: [phone]

## 2022-02-09 NOTE — PROGRESS NOTE ADULT - SUBJECTIVE AND OBJECTIVE BOX
Patient is a 89y old  Female with left hip fracture s/p surgery , delirium     she is awake alert   yesterday overnight events . agitated zyprexa given by PA   awake alert confused trying to get out of bed this am " i need to use restroom "     remains on 1 :1     MEDICATIONS  (STANDING):  acetaminophen     Tablet .. 975 milliGRAM(s) Oral every 8 hours  artificial  tears Solution 1 Drop(s) Right EYE every 6 hours  ascorbic acid 500 milliGRAM(s) Oral two times a day  aspirin  chewable 81 milliGRAM(s) Oral daily  atorvastatin 20 milliGRAM(s) Oral at bedtime  celecoxib 200 milliGRAM(s) Oral daily  enoxaparin Injectable 40 milliGRAM(s) SubCutaneous daily  ergocalciferol 43020 Unit(s) Oral <User Schedule>  fentaNYL   Patch  12 MICROgram(s)/Hr 1 Patch Transdermal every 72 hours  ferrous    sulfate 325 milliGRAM(s) Oral daily  lactulose Syrup 20 Gram(s) Oral once  levothyroxine 25 MICROGram(s) Oral daily  melatonin 3 milliGRAM(s) Oral at bedtime  polyethylene glycol 3350 17 Gram(s) Oral two times a day  risperiDONE   Tablet 0.25 milliGRAM(s) Oral two times a day  senna 2 Tablet(s) Oral at bedtime  tamsulosin 0.4 milliGRAM(s) Oral at bedtime  tranexamic acid IVPB 1000 milliGRAM(s) IV Intermittent once      Vital Signs Last 24 Hrs  T(C): 36.8 (09 Feb 2022 07:17), Max: 36.8 (08 Feb 2022 21:27)  T(F): 98.2 (09 Feb 2022 07:17), Max: 98.3 (08 Feb 2022 21:27)  HR: 97 (09 Feb 2022 07:17) (97 - 109)  BP: 160/79 (09 Feb 2022 07:17) (134/63 - 160/79)  BP(mean): --  RR: 18 (09 Feb 2022 07:17) (18 - 18)  SpO2: 90% (09 Feb 2022 07:17) (90% - 92%)    PHYSICAL EXAM:  General: awake confused ,  fragile elderly female   Lungs: CTA bilateral   Cardio: RRR, S1/S2, No murmur  Abdomen: Soft, Nontender, Nondistended; Bowel sounds present  Extremities: No calf tenderness, No pitting edema  left  hip skin surgery wound site dressing in place , no skin redness    : +  jolanta                                      9.0    9.93  )-----------( 310      ( 09 Feb 2022 05:26 )             27.9   02-09    140  |  106  |  26.5<H>  ----------------------------<  121<H>  4.3   |  23.0  |  0.67    Ca    8.5<L>      09 Feb 2022 05:26  Phos  3.4     02-09

## 2022-02-09 NOTE — PROGRESS NOTE ADULT - SUBJECTIVE AND OBJECTIVE BOX
[facility] PALLIATIVE MEDICINE     CC: FOLLOW UP VISIT + symptom mgnt    INTERVAL HPI/OVERNIGHT EVENTS:  Source if other than patient:  []Family   [x]Team     Per staff, agitated overnight. Remains on 1:1 tries to pull at lines and requires frequent reorientation, confused and poor concentration.    PRESENT SYMPTOMS:     Dyspnea: No  Nausea/Vomiting:  No  Anxiety:  Yes +restlessness  Depression: unable  Fatigue:  No  Loss of appetite: Yes    Constipation: Yes      Pain: per aide, notable grimacing when attempts made to move left hip.            Character-            Duration-            Effect-            Factors-            Frequency-            Location-            Severity-    Pain AD Score:  [email or web address] (press ctrl + left click to view)    Review of Systems: Unable to obtain due to poor mentation/encephalopathy     MEDICATIONS  (STANDING):  acetaminophen     Tablet .. 975 milliGRAM(s) Oral every 8 hours  artificial  tears Solution 1 Drop(s) Right EYE every 6 hours  ascorbic acid 500 milliGRAM(s) Oral two times a day  aspirin  chewable 81 milliGRAM(s) Oral daily  atorvastatin 20 milliGRAM(s) Oral at bedtime  celecoxib 200 milliGRAM(s) Oral daily  enoxaparin Injectable 40 milliGRAM(s) SubCutaneous daily  ergocalciferol 42706 Unit(s) Oral <User Schedule>  fentaNYL   Patch  12 MICROgram(s)/Hr 1 Patch Transdermal every 72 hours  ferrous    sulfate 325 milliGRAM(s) Oral daily  lactulose Syrup 20 Gram(s) Oral once  levothyroxine 25 MICROGram(s) Oral daily  melatonin 3 milliGRAM(s) Oral at bedtime  polyethylene glycol 3350 17 Gram(s) Oral two times a day  risperiDONE   Tablet 0.25 milliGRAM(s) Oral <User Schedule>  risperiDONE   Tablet 0.5 milliGRAM(s) Oral at bedtime  senna 2 Tablet(s) Oral at bedtime  tamsulosin 0.4 milliGRAM(s) Oral at bedtime  tranexamic acid IVPB 1000 milliGRAM(s) IV Intermittent once    MEDICATIONS  (PRN):  QUEtiapine 12.5 milliGRAM(s) Oral every 8 hours PRN restlessness      PHYSICAL EXAM:    Vital Signs Last 24 Hrs  T(C): 36.8 (09 Feb 2022 07:17), Max: 36.8 (08 Feb 2022 21:27)  T(F): 98.2 (09 Feb 2022 07:17), Max: 98.3 (08 Feb 2022 21:27)  HR: 97 (09 Feb 2022 07:17) (97 - 109)  BP: 160/79 (09 Feb 2022 07:17) (134/63 - 160/79)  BP(mean): --  RR: 18 (09 Feb 2022 07:17) (18 - 18)  SpO2: 90% (09 Feb 2022 07:17) (90% - 92%)       Karnofsky:  30%    GEN: frail. elderly. resting comfortably and no acute distress    HEENT: mucous membrane moist      Lungs: comfortable, nonlabored    CV: +s1/s2 regular rate and rhythm     GI: +BS, abdomen soft, nontender, nondistended     : stover    MSK: moves all 4 extremities, no cyanosis or edema +weakness    NEURO: nonfocal. awake and confused, unable to participate in any meaningful conversation     Skin: warm and dry.      LABS:                          9.0    9.93  )-----------( 310      ( 09 Feb 2022 05:26 )             27.9     02-09    140  |  106  |  26.5<H>  ----------------------------<  121<H>  4.3   |  23.0  |  0.67    Ca    8.5<L>      09 Feb 2022 05:26  Phos  3.4     02-09          I&O's Summary    08 Feb 2022 07:01  -  09 Feb 2022 07:00  --------------------------------------------------------  IN: 0 mL / OUT: 1500 mL / NET: -1500 mL    09 Feb 2022 07:01  -  09 Feb 2022 11:13  --------------------------------------------------------  IN: 0 mL / OUT: 250 mL / NET: -250 mL        RADIOLOGY & ADDITIONAL STUDIES:     CT A/P 2/3/22    ACC: [id number] EXAM:  CT ABDOMEN AND PELVIS                          PROCEDURE DATE:  02/03/2022          INTERPRETATION:  CLINICAL INFORMATION: Postop with low urine output and   anemia. Rule out bleed    COMPARISON: 2/1/2022    CONTRAST/COMPLICATIONS:  IV Contrast: NONE  Oral Contrast: NONE  Complications: None reported at time of study completion    PROCEDURE:  CT of the Abdomen and Pelvis was performed.  Sagittal and coronal reformats were performed.    FINDINGS:  LOWER CHEST: Minimal bilateral pleural effusions. Coronary artery   calcification is noted    LIVER: Within normal limits.  BILE DUCTS: Normal caliber.  GALLBLADDER: High density in the gallbladder may reflect vicarious   excretion .  SPLEEN: Within normal limits.  PANCREAS: Within normal limits.  ADRENALS: Within normal limits.  KIDNEYS/URETERS: Within normal limits.    BLADDER: Within normal limits.  REPRODUCTIVE ORGANS: Uterus and adnexa within normal limits.    BOWEL: No bowel obstruction. Appendix is normal. There is colonic   diverticulosis without diverticulitis  PERITONEUM: No ascites.  VESSELS: Atherosclerotic changes.  RETROPERITONEUM/LYMPH NODES: No lymphadenopathy.  ABDOMINAL WALL: Within normal limits.  BONES: Osteopenia and degenerative changes. Old healed right inferior rib   fractures status post ORIF of the left hip for comminuted   intertrochanteric fracture old left pubic rami fractures are also   identified. The visualized left lower extremity is increased in diameter   compared to the contralateral thigh with suggestion of intramuscular   hemorrhage at the site of the fracture. Small air bubbles are seen within   the musculature of the thigh consistent with recent fracture and surgery.   Hematoma is again seen in the left buttock    IMPRESSION:  Post traumatic/postoperative changes in the left hip status post ORIF of   intertrochanteric fracture with areas of intramuscular hematoma again   identified  Minimal bilateral pleural effusions        --- End of Report ---      CHUNG HAMLIN MD; Attending Radiologist  This document has been electronically signed. Feb  3 2022  7:43PM      ADVANCE DIRECTIVES/TREATMENT PREFERENCES:  DNR YES NO  Completed on:                     MOLST  YES NO   Completed on: by hospitalist 2/7  Living Will  YES NO   Completed on:

## 2022-02-09 NOTE — ED ADULT NURSE NOTE - NSFALLRSKASSESSDT_ED_ALL_ED
Wound Care: Antibiotic ointment Quadrant Reporting?: no Display The Frozen Section And Histology As A Separate Paragraph: Yes Length To Time In Minutes Device Was In Place: 10 Quadrants Positive?: 0 Scc Well Differentiated Histology Text: Arising from the epidermis is a keratin-producing proliferation of atypical keratinocytes with invasion into the underlying dermis. Mitoses and atypical forms are evident. Intercellular bridge and keratin lorin formation are evident. W Plasty Text: The lesion was extirpated to the level of the fat with a #15 scalpel blade.  Given the location of the defect, shape of the defect and the proximity to free margins a W-plasty was deemed most appropriate for repair.  Using a sterile surgical marker, the appropriate transposition arms of the W-plasty were drawn incorporating the defect and placing the expected incisions within the relaxed skin tension lines where possible.    The area thus outlined was incised deep to adipose tissue with a #15 scalpel blade.  The skin margins were undermined to an appropriate distance in all directions utilizing iris scissors.  The opposing transposition arms were then transposed into place in opposite direction and anchored with interrupted buried subcutaneous sutures. Intermediate Repair Preamble Text (Leave Blank If You Do Not Want): Undermining was performed with blunt dissection. Was The Patient On Physician Recommended Anticoagulation Therapy?: Please Select the Appropriate Response Provider Procedure Text (F): After obtaining clear surgical margins the defect was repaired by another provider. Epidermal Autograft Text: The defect edges were debeveled with a #15 scalpel blade.  Given the location of the defect, shape of the defect and the proximity to free margins an epidermal autograft was deemed most appropriate.  Using a sterile surgical marker, the primary defect shape was transferred to the donor site. The epidermal graft was then harvested.  The skin graft was then placed in the primary defect and oriented appropriately. Afx Histology Text: Present within the dermis is a highly cellular neoplasm composed of pleomorphic spindled and epithelioid cells. The cells are arranged in a haphazard fashion and associated with mitotic figures, including atypical forms. Mustarde Flap Text: The defect edges were debeveled with a #15 scalpel blade.  Given the size, depth and location of the defect and the proximity to free margins a Mustarde flap was deemed most appropriate.  Using a sterile surgical marker, an appropriate flap was drawn incorporating the defect. The area thus outlined was incised with a #15 scalpel blade.  The skin margins were undermined to an appropriate distance in all directions utilizing iris scissors. Staging Info: By selecting yes to the question above you will include information on AJCC 8 tumor staging in your Mohs note. Information on tumor staging will be automatically added for SCCs on the head and neck. AJCC 8 includes tumor size, tumor depth, perineural involvement and bone invasion. Ear Wedge Repair Text: A wedge excision was completed by carrying down an excision through the full thickness of the ear and cartilage with an inward facing Burow's triangle. The wound was then closed in a layered fashion. Alternatives Discussed Intro (Do Not Add Period): I discussed alternative treatments to Mohs surgery and specifically discussed the risks and benefits of Peng Advancement Flap Text: The defect edges were debeveled with a #15 scalpel blade.  Given the location of the defect, shape of the defect and the proximity to free margins a Peng advancement flap was deemed most appropriate.  Using a sterile surgical marker, an appropriate advancement flap was drawn incorporating the defect and placing the expected incisions within the relaxed skin tension lines where possible. The area thus outlined was incised deep to adipose tissue with a #15 scalpel blade.  The skin margins were undermined to an appropriate distance in all directions utilizing iris scissors. Pain Refusal Text: I offered to prescribe pain medication but the patient refused to take this medication. Mixed Superficial And Nodular Bcc Histology Text: Arising from the epidermis and superficial hair follicles are small, superficial, multicentric buds of basaloid keratinocytes. The cells have scant cytoplasm and round dark nuclei. Mitotic figures and apoptotic bodies are evident. The nuclei at the periphery of the islands have a palisaded arrangement. The islands are associated with a fibromyxoid stroma and there is cleft formation between some of the islands and stroma.  Emanating from the epidermis and infiltrating the dermis are irregularly shaped islands of basaloid keratinocytes. The cells have scant cytoplasm and round dark nuclei. Mitotic figures and apoptotic bodies are evident. The nuclei at the periphery of the islands have a palisaded arrangement. The islands are associated with a fibromyxoid stroma and there is a cleft formation between some of the islands and stroma. Where Do You Want The Question To Include Opioid Counseling Located?: Case Summary Tab Star Wedge Flap Text: The defect edges were debeveled with a #15 scalpel blade.  Given the location of the defect, shape of the defect and the proximity to free margins a star wedge flap was deemed most appropriate.  Using a sterile surgical marker, an appropriate rotation flap was drawn incorporating the defect and placing the expected incisions within the relaxed skin tension lines where possible. The area thus outlined was incised deep to adipose tissue with a #15 scalpel blade.  The skin margins were undermined to an appropriate distance in all directions utilizing iris scissors. Double O-Z Flap Text: The defect edges were debeveled with a #15 scalpel blade.  Given the location of the defect, shape of the defect and the proximity to free margins a Double O-Z flap was deemed most appropriate.  Using a sterile surgical marker, an appropriate transposition flap was drawn incorporating the defect and placing the expected incisions within the relaxed skin tension lines where possible. The area thus outlined was incised deep to adipose tissue with a #15 scalpel blade.  The skin margins were undermined to an appropriate distance in all directions utilizing iris scissors. Consent (Lip)/Introductory Paragraph: The rationale for Mohs was explained to the patient and consent was obtained. The risks, benefits and alternatives to therapy were discussed in detail. Specifically, the risks of lip deformity, changes in the oral aperture, infection, scarring, bleeding, prolonged wound healing, incomplete removal, allergy to anesthesia, nerve injury and recurrence were addressed. Prior to the procedure, the treatment site was clearly identified and confirmed by the patient. All components of Universal Protocol/PAUSE Rule completed. Mid-Level Procedure Text (E): After obtaining clear surgical margins the patient was sent to a mid-level provider for surgical repair.  The patient understands they will receive post-surgical care and follow-up from the mid-level provider. Bcc Micronodular Histology Text: Emanating from the epidermis and infiltrating the dermis are irregularly shaped islands of basaloid keratinocytes. The cells have scant cytoplasm and dark round nuclei. Mitotic figures and apoptotic bodies are evident. The nuclei at the periphery of the islands have a palisaded arrangement. The islands are associated with a fibromyxoid stroma and there is cleft formation between some of the islands and stroma. In areas the tumor breaks up into a small, micronodular, infiltrative growth pattern with deeper extension into the dermis. Asc Procedure Text (C): After obtaining clear surgical margins the patient was sent to an ASC for surgical repair.  The patient understands they will receive post-surgical care and follow-up from the ASC physician. Hemostasis: Portable Heat Cautery Stage 8: Additional Anesthesia Type: 1% lidocaine with epinephrine O-L Flap Text: The defect edges were debeveled with a #15 scalpel blade.  Given the location of the defect, shape of the defect and the proximity to free margins an O-L flap was deemed most appropriate.  Using a sterile surgical marker, an appropriate advancement flap was drawn incorporating the defect and placing the expected incisions within the relaxed skin tension lines where possible.    The area thus outlined was incised deep to adipose tissue with a #15 scalpel blade.  The skin margins were undermined to an appropriate distance in all directions utilizing iris scissors. Island Pedicle Flap Text: The defect edges were debeveled with a #15 scalpel blade.  Given the location of the defect, shape of the defect and the proximity to free margins an island pedicle advancement flap was deemed most appropriate.  Using a sterile surgical marker, an appropriate advancement flap was drawn incorporating the defect, outlining the appropriate donor tissue and placing the expected incisions within the relaxed skin tension lines where possible.    The area thus outlined was incised deep to adipose tissue with a #15 scalpel blade.  The skin margins were undermined to an appropriate distance in all directions around the primary defect and laterally outward around the island pedicle utilizing iris scissors.  There was minimal undermining beneath the pedicle flap. Spiral Flap Text: The defect edges were debeveled with a #15 scalpel blade.  Given the location of the defect, shape of the defect and the proximity to free margins a spiral flap was deemed most appropriate.  Using a sterile surgical marker, an appropriate rotation flap was drawn incorporating the defect and placing the expected incisions within the relaxed skin tension lines where possible. The area thus outlined was incised deep to adipose tissue with a #15 scalpel blade.  The skin margins were undermined to an appropriate distance in all directions utilizing iris scissors. Special Stains Stage 5 - Results: Base On Clearance Noted Above Vermilion Border Text: The closure involved the vermilion border. Oculoplastic Surgeon Procedure Text (D): After obtaining clear surgical margins the patient was sent to oculoplastics for surgical repair.  The patient understands they will receive post-surgical care and follow-up from the referring physician's office. Nostril Rim Text: The closure involved the nostril rim. Muscle Hinge Flap Text: The defect edges were debeveled with a #15 scalpel blade.  Given the size, depth and location of the defect and the proximity to free margins a muscle hinge flap was deemed most appropriate.  Using a sterile surgical marker, an appropriate hinge flap was drawn incorporating the defect. The area thus outlined was incised with a #15 scalpel blade.  The skin margins were undermined to an appropriate distance in all directions utilizing iris scissors. Scc Ka Subtype Histology Text: There is a symmetric, crateriform nodule with a central keratinous core. Buttress formation is noted at the peripheries of the nodule. The keratinocytes are enlarged with glassy, eosinophilic cytoplasm. The atypical keratinocytes infiltrate the dermis in an irregular fashion and are associated with inflammation including lymphomononuclear cells and eosinophils. Modified Advancement Flap Text: The defect edges were debeveled with a #15 scalpel blade.  Given the location of the defect, shape of the defect and the proximity to free margins a modified advancement flap was deemed most appropriate.  Using a sterile surgical marker, an appropriate advancement flap was drawn incorporating the defect and placing the expected incisions within the relaxed skin tension lines where possible.    The area thus outlined was incised deep to adipose tissue with a #15 scalpel blade.  The skin margins were undermined to an appropriate distance in all directions utilizing iris scissors. H Plasty Text: Given the location of the defect, shape of the defect and the proximity to free margins a H-plasty was deemed most appropriate for repair.  Using a sterile surgical marker, the appropriate advancement arms of the H-plasty were drawn incorporating the defect and placing the expected incisions within the relaxed skin tension lines where possible. The area thus outlined was incised deep to adipose tissue with a #15 scalpel blade. The skin margins were undermined to an appropriate distance in all directions utilizing iris scissors.  The opposing advancement arms were then advanced into place in opposite direction and anchored with interrupted buried subcutaneous sutures. Purse String (Intermediate) Text: Given the location of the defect and the characteristics of the surrounding skin a pursestring intermediate closure was deemed most appropriate.  Undermining was performed circumfirentially around the surgical defect.  A purstring suture was then placed and tightened. Helical Rim Advancement Flap Text: The defect edges were debeveled with a #15 blade scalpel.  Given the location of the defect and the proximity to free margins (helical rim) a double helical rim advancement flap was deemed most appropriate.  Using a sterile surgical marker, the appropriate advancement flaps were drawn incorporating the defect and placing the expected incisions between the helical rim and antihelix where possible.  The area thus outlined was incised through and through with a #15 scalpel blade.  With a skin hook and iris scissors, the flaps were gently and sharply undermined and freed up. No Repair - Repaired With Adjacent Surgical Defect Text (Leave Blank If You Do Not Want): After obtaining clear surgical margins the defect was repaired concurrently with another surgical defect which was in close approximation. Otolaryngologist Procedure Text (B): After obtaining clear surgical margins the patient was sent to otolaryngology for surgical repair.  The patient understands they will receive post-surgical care and follow-up from the referring physician's office. Lentigo Maligna Histology Text: There is an intraepidermal melanocytic proliferation arranged as nests as well as individual cells. The individual cells are crowded along the dermal-epidermal junction with well-developed confluent growth. Pagetoid migration to the upper levels of the epidermis is noted. The individual melanocytes are enlarged and hyperchromatic with irregular nuclear contours and coarse chromatin. Within the dermis there are rare nests of similar appearing melanocytes that are associated with fibroplasia, mild chronic inflammation, and solar elastosis. 01-Feb-2022 07:50 Simple / Intermediate / Complex Repair - Final Wound Length In Cm: 2.7 Composite Graft Text: The defect edges were debeveled with a #15 scalpel blade.  Given the location of the defect, shape of the defect, the proximity to free margins and the fact the defect was full thickness a composite graft was deemed most appropriate.  The defect was outline and then transferred to the donor site.  A full thickness graft was then excised from the donor site. The graft was then placed in the primary defect, oriented appropriately and then sutured into place.  The secondary defect was then repaired using a primary closure. Presence Of Scar Tissue (For Histology): absent Stage 5: Additional Anesthesia Type: 0.5% lidocaine with 1:200,000 epinephrine and a 1:10 solution of 8.4% sodium bicarbonate Advancement Flap (Double) Text: The defect edges were debeveled with a #15 scalpel blade.  Given the location of the defect and the proximity to free margins a double advancement flap was deemed most appropriate.  Using a sterile surgical marker, the appropriate advancement flaps were drawn incorporating the defect and placing the expected incisions within the relaxed skin tension lines where possible.    The area thus outlined was incised deep to adipose tissue with a #15 scalpel blade.  The skin margins were undermined to an appropriate distance in all directions utilizing iris scissors. Complex Repair Preamble Text (Leave Blank If You Do Not Want): Extensive wide undermining was performed. Area L Indication Text: Tumors in this location are included in Area L (trunk and extremities).  Mohs surgery is indicated for larger tumors, or tumors with aggressive histologic features, in these anatomic locations. O-T Advancement Flap Text: The defect edges were debeveled with a #10 scalpel blade.  Given the location of the defect, shape of the defect and the proximity to free margins an O-T advancement flap was deemed most appropriate.  Using a sterile surgical marker, an appropriate advancement flap was drawn incorporating the defect and placing the expected incisions within the relaxed skin tension lines where possible.    The area thus outlined was incised deep to adipose tissue with a #10 scalpel blade.  The skin margins were undermined to an appropriate distance in all directions utilizing iris scissors. Dorsal Nasal Flap Text: The defect edges were debeveled with a #15 scalpel blade.  Given the location of the defect and the proximity to free margins a dorsal nasal flap was deemed most appropriate.  Using a sterile surgical marker, an appropriate dorsal nasal flap was drawn around the defect.    The area thus outlined was incised deep to adipose tissue with a #15 scalpel blade.  The skin margins were undermined to an appropriate distance in all directions utilizing iris scissors. Body Location Override (Optional - Billing Will Still Be Based On Selected Body Map Location If Applicable): left Nasolabial fold Adjacent Tissue Transfer Text: The defect edges were debeveled with a #15 scalpel blade.  Given the location of the defect and the proximity to free margins an adjacent tissue transfer was deemed most appropriate.  Using a sterile surgical marker, an appropriate flap was drawn incorporating the defect and placing the expected incisions within the relaxed skin tension lines where possible.    The area thus outlined was incised deep to adipose tissue with a #15 scalpel blade.  The skin margins were undermined to an appropriate distance in all directions utilizing iris scissors. Rhombic Flap Text: The defect edges were debeveled with a #15 scalpel blade.  Given the location of the defect and the proximity to free margins a rhombic flap was deemed most appropriate.  Using a sterile surgical marker, an appropriate rhombic flap was drawn incorporating the defect.    The area thus outlined was incised deep to adipose tissue with a #15 scalpel blade.  The skin margins were undermined to an appropriate distance in all directions utilizing iris scissors. Cartilage Graft Text: The defect edges were debeveled with a #15 scalpel blade.  Given the location of the defect, shape of the defect, the fact the defect involved a full thickness cartilage defect a cartilage graft was deemed most appropriate.  An appropriate donor site was identified, cleansed, and anesthetized. The cartilage graft was then harvested and transferred to the recipient site, oriented appropriately and then sutured into place.  The secondary defect was then repaired using a primary closure. Purse String (Simple) Text: Given the location of the defect and the characteristics of the surrounding skin a pursestring closure was deemed most appropriate.  Undermining was performed circumfirentially around the surgical defect.  A purstring suture was then placed and tightened. Suturegard Retention Suture: 2-0 Nylon Transposition Flap Text: The defect edges were debeveled with a #15 scalpel blade.  Given the location of the defect and the proximity to free margins a superior based transposition flap was deemed most appropriate.  Using a sterile surgical marker, an appropriate superior based transposition flap was drawn incorporating the defect.    The area thus outlined was incised deep to adipose tissue with a #15 scalpel blade.  The skin margins were undermined to an appropriate distance in all directions utilizing iris scissors. Mohs Histo Method Verbiage: The tissue was placed in petri dishes with epidermis superior to achieve precise orientation. Horizontal sectioning of the base and contiguous peripheral margins was then carried out and the prepared microscopic sections were examined by Dr. Presley. Mercedes Flap Text: The defect edges were debeveled with a #15 scalpel blade.  Given the location of the defect, shape of the defect and the proximity to free margins a Mercedes flap was deemed most appropriate.  Using a sterile surgical marker, an appropriate advancement flap was drawn incorporating the defect and placing the expected incisions within the relaxed skin tension lines where possible. The area thus outlined was incised deep to adipose tissue with a #15 scalpel blade.  The skin margins were undermined to an appropriate distance in all directions utilizing iris scissors. Consent (Nose)/Introductory Paragraph: The rationale for Mohs was explained to the patient and consent was obtained. The risks, benefits and alternatives to therapy were discussed in detail. Specifically, the risks of nasal deformity, changes in the flow of air through the nose, infection, scarring, bleeding, prolonged wound healing, incomplete removal, allergy to anesthesia, nerve injury and recurrence were addressed. Prior to the procedure, the treatment site was clearly identified and confirmed by the patient. All components of Universal Protocol/PAUSE Rule completed. Stage 1: Number Of Blocks?: 4 Mauc Instructions: By selecting yes to the question below the MAUC number will be added into the note.  This will be calculated automatically based on the diagnosis chosen, the size entered, the body zone selected (H,M,L) and the specific indications you chose. You will also have the option to override the Mohs AUC if you disagree with the automatically calculated number and this option is found in the Case Summary tab. Primary Defect Length In Cm (Final Defect Size - Required For Flaps/Grafts): 0.8 Mucosal Advancement Flap Text: Given the location of the defect, shape of the defect and the proximity to free margins a mucosal advancement flap was deemed most appropriate. Incisions were made with a 15 blade scalpel in the appropriate fashion along the cutaneous vermillion border and the mucosal lip. The remaining actinically damaged mucosal tissue was excised.  The mucosal advancement flap was then elevated to the gingival sulcus with care taken to preserve the neurovascular structures and advanced into the primary defect. Care was taken to ensure that precise realignment of the vermillion border was achieved. Medical Necessity Statement: Based on my medical judgement, Mohs surgery is the most appropriate treatment for this cancer compared to other treatments. Bi-Rhombic Flap Text: The defect edges were debeveled with a #15 scalpel blade.  Given the location of the defect and the proximity to free margins a bi-rhombic flap was deemed most appropriate.  Using a sterile surgical marker, an appropriate rhombic flap was drawn incorporating the defect. The area thus outlined was incised deep to adipose tissue with a #15 scalpel blade.  The skin margins were undermined to an appropriate distance in all directions utilizing iris scissors. Secondary Intention Text (Leave Blank If You Do Not Want): The defect will heal with secondary intention. Area M Indication Text: Tumors in this location are included in Area M (cheek, forehead, scalp, neck, jawline and pretibial skin).  Mohs surgery is indicated for tumors in these anatomic locations. A-T Advancement Flap Text: The defect edges were debeveled with a #15 scalpel blade.  Given the location of the defect, shape of the defect and the proximity to free margins an A-T advancement flap was deemed most appropriate.  Using a sterile surgical marker, an appropriate advancement flap was drawn incorporating the defect and placing the expected incisions within the relaxed skin tension lines where possible.    The area thus outlined was incised deep to adipose tissue with a #15 scalpel blade.  The skin margins were undermined to an appropriate distance in all directions utilizing iris scissors. Trilobed Flap Text: The defect edges were debeveled with a #15 scalpel blade.  Given the location of the defect and the proximity to free margins a trilobed flap was deemed most appropriate.  Using a sterile surgical marker, an appropriate trilobed flap drawn around the defect.    The area thus outlined was incised deep to adipose tissue with a #15 scalpel blade.  The skin margins were undermined to an appropriate distance in all directions utilizing iris scissors. Mohs Rapid Report Verbiage: The area of clinically evident tumor was marked with skin marking ink and appropriately hatched.  The initial incision was made following the Mohs approach through the skin.  The specimen was taken to the lab, divided into the necessary number of pieces, chromacoded and processed according to the Mohs protocol.  This was repeated in successive stages until a tumor free defect was achieved. Closure 2 Information: This tab is for additional flaps and grafts, including complex repair and grafts and complex repair and flaps. You can also specify a different location for the additional defect, if the location is the same you do not need to select a new one. We will insert the automated text for the repair you select below just as we do for solitary flaps and grafts. Please note that at this time if you select a location with a different insurance zone you will need to override the ICD10 and CPT if appropriate. Burow's Advancement Flap Text: The defect edges were debeveled with a #15 scalpel blade.  Given the location of the defect and the proximity to free margins a Burow's advancement flap was deemed most appropriate.  Using a sterile surgical marker, the appropriate advancement flap was drawn incorporating the defect and placing the expected incisions within the relaxed skin tension lines where possible.    The area thus outlined was incised deep to adipose tissue with a #15 scalpel blade.  The skin margins were undermined to an appropriate distance in all directions utilizing iris scissors. Dressing: pressure dressing with telfa Mart-1 - Negative Histology Text: MART-1 staining demonstrates a normal density and pattern of melanocytes along the dermal-epidermal junction. The surgical margins are negative for tumor cells. Number Of Epidermal Sutures (Optional): 6 Undermining Type: Entire Wound V-Y Plasty Text: The defect edges were debeveled with a #15 scalpel blade.  Given the location of the defect, shape of the defect and the proximity to free margins an V-Y advancement flap was deemed most appropriate.  Using a sterile surgical marker, an appropriate advancement flap was drawn incorporating the defect and placing the expected incisions within the relaxed skin tension lines where possible.    The area thus outlined was incised deep to adipose tissue with a #15 scalpel blade.  The skin margins were undermined to an appropriate distance in all directions utilizing iris scissors. Unique Flap 1 Text: Given the location of the defect, shape of the defect and the proximity to free margins a Fernandez rotation flap was deemed most appropriate.  Using a sterile surgical marker, an appropriate rotation flap was drawn incorporating the defect and placing the expected incisions within the nasal cosmetic unit and extending in a curvilinear fashion along the nasofacial junction and continuing to the superior nasoglabellar junction.  Finishing with a tagential orientation of the incision.   The area thus outlined was incised deep to muscle and undermined in the submuscular plane of tissue with a #10 scalpel blade.  The skin margins were undermined to an appropriate distance in all directions utilizing iris scissors. The flap was rotated into the defect.  A small tricone was removed at the distal tip of the flap. Plastic Surgeon Procedure Text (F): After obtaining clear surgical margins the patient was sent to plastics for surgical repair.  The patient understands they will receive post-surgical care and follow-up from the referring physician's office. Donor Site Anesthesia Type: same as repair anesthesia Repair Anesthesia Method: local infiltration Anesthesia Volume In Cc: 2 Consent (Ear)/Introductory Paragraph: The rationale for Mohs was explained to the patient and consent was obtained. The risks, benefits and alternatives to therapy were discussed in detail. Specifically, the risks of ear deformity, infection, scarring, bleeding, prolonged wound healing, incomplete removal, allergy to anesthesia, nerve injury and recurrence were addressed. Prior to the procedure, the treatment site was clearly identified and confirmed by the patient. All components of Universal Protocol/PAUSE Rule completed. Previous Accession (Optional): [id number] Burow's Graft Text: The defect edges were debeveled with a #15 scalpel blade.  Given the location of the defect, shape of the defect, the proximity to free margins and the presence of a standing cone deformity a Burow's skin graft was deemed most appropriate. The standing cone was removed and this tissue was then trimmed to the shape of the primary defect. The adipose tissue was also removed until only dermis and epidermis were left.  The skin margins of the secondary defect were undermined to an appropriate distance in all directions utilizing iris scissors.  The secondary defect was closed with interrupted buried subcutaneous sutures.  The skin edges were then re-apposed with running  sutures.  The skin graft was then placed in the primary defect and oriented appropriately. Melolabial Interpolation Flap Text: A decision was made to reconstruct the defect utilizing an interpolation axial flap and a staged reconstruction.  A telfa template was made of the defect.  This telfa template was then used to outline the melolabial interpolation flap.  The donor area for the pedicle flap was then injected with anesthesia.  The flap was excised through the skin and subcutaneous tissue down to the layer of the underlying musculature.  The pedicle flap was carefully excised within this deep plane to maintain its blood supply.  The edges of the donor site were undermined.   The donor site was closed in a primary fashion.  The pedicle was then rotated into position and sutured.  Once the tube was sutured into place, adequate blood supply was confirmed with blanching and refill.  The pedicle was then wrapped with xeroform gauze and dressed appropriately with a telfa and gauze bandage to ensure continued blood supply and protect the attached pedicle. Xenograft Text: The defect edges were debeveled with a #15 scalpel blade.  Given the location of the defect, shape of the defect and the proximity to free margins a xenograft was deemed most appropriate.  The graft was then trimmed to fit the size of the defect.  The graft was then placed in the primary defect and oriented appropriately. Bcc Superficial Histology Text: Arising from the epidermis and superficial hair follicles are small, superficial, multicentric buds of basaloid keratinocytes. The cells have scant cytoplasm and round dark nuclei. Mitotic figures and apoptotic bodies are evident. The nuclei at the periphery of the islands have a palisaded arrangement. The islands are associated with a fibromyxoid stroma and there is cleft formation between some of the islands and stroma. Number Of Deep Sutures (Optional): 3 Estimated Blood Loss (Cc): minimal Hemigard Postcare Instructions: The HEMIGARD strips are to remain completely dry for at least 5-7 days. Bilobed Transposition Flap Text: The defect edges were debeveled with a #15 scalpel blade.  Given the location of the defect and the proximity to free margins a bilobed transposition flap was deemed most appropriate.  Using a sterile surgical marker, an appropriate bilobe flap drawn around the defect.    The area thus outlined was incised deep to adipose tissue with a #15 scalpel blade.  The skin margins were undermined to an appropriate distance in all directions utilizing iris scissors. Subsequent Stages Histo Method Verbiage: Using a similar technique to that described above, a thin layer of tissue was removed from all areas where tumor was visible on the previous stage.  The tissue was again oriented, mapped, dyed, and processed as above. Detail Level: Detailed Surgeon/Pathologist Verbiage (Will Incorporate Name Of Surgeon From Intro If Not Blank): operated in two distinct and integrated capacities as the surgeon and pathologist. Epidermal Closure Graft Donor Site (Optional): simple interrupted Consent Type: Consent 1 (Standard) Melolabial Transposition Flap Text: The defect edges were debeveled with a #15 scalpel blade.  Given the location of the defect and the proximity to free margins a melolabial flap was deemed most appropriate.  Using a sterile surgical marker, an appropriate melolabial transposition flap was drawn incorporating the defect.    The area thus outlined was incised deep to adipose tissue with a #15 scalpel blade.  The skin margins were undermined to an appropriate distance in all directions utilizing iris scissors. Scc In Situ With Follicular Extension Histology Text: Within the epidermis is a full thickness proliferation of atypical keratinocytes with mitoses. The overlying stratum corneum demonstrates parakeratosis. No invasion is noted. The atypical cells extend down hair follicle structures. Double O-Z Plasty Text: The defect edges were debeveled with a #15 scalpel blade.  Given the location of the defect, shape of the defect and the proximity to free margins a Double O-Z plasty (double transposition flap) was deemed most appropriate.  Using a sterile surgical marker, the appropriate transposition flaps were drawn incorporating the defect and placing the expected incisions within the relaxed skin tension lines where possible. The area thus outlined was incised deep to adipose tissue with a #15 scalpel blade.  The skin margins were undermined to an appropriate distance in all directions utilizing iris scissors.  Hemostasis was achieved with electrocautery.  The flaps were then transposed into place, one clockwise and the other counterclockwise, and anchored with interrupted buried subcutaneous sutures. Cheiloplasty (Complex) Text: A decision was made to reconstruct the defect with a  cheiloplasty.  The defect was undermined extensively.  Additional obicularis oris muscle was excised with a 15 blade scalpel.  The defect was converted into a full thickness wedge to facilite a better cosmetic result.  Small vessels were then tied off with 5-0 monocyrl. The obicularis oris, superficial fascia, adipose and dermis were then reapproximated.  After the deeper layers were approximated the epidermis was reapproximated with particular care given to realign the vermillion border. Cheek Interpolation Flap Text: A decision was made to reconstruct the defect utilizing an interpolation axial flap and a staged reconstruction.  A telfa template was made of the defect.  This telfa template was then used to outline the Cheek Interpolation flap.  The donor area for the pedicle flap was then injected with anesthesia.  The flap was excised through the skin and subcutaneous tissue down to the layer of the underlying musculature.  The interpolation flap was carefully excised within this deep plane to maintain its blood supply.  The edges of the donor site were undermined.   The donor site was closed in a primary fashion.  The pedicle was then rotated into position and sutured.  Once the tube was sutured into place, adequate blood supply was confirmed with blanching and refill.  The pedicle was then wrapped with xeroform gauze and dressed appropriately with a telfa and gauze bandage to ensure continued blood supply and protect the attached pedicle. Wound Care (No Sutures): Petrolatum Bilobed Flap Text: The defect edges were debeveled with a #15 scalpel blade.  Given the location of the defect and the proximity to free margins a bilobe flap was deemed most appropriate.  Using a sterile surgical marker, an appropriate bilobe flap drawn around the defect.    The area thus outlined was incised deep to adipose tissue with a #15 scalpel blade.  The skin margins were undermined to an appropriate distance in all directions utilizing iris scissors. Tarsorrhaphy Text: A tarsorrhaphy was performed using Frost sutures. Consent (Near Eyelid Margin)/Introductory Paragraph: The rationale for Mohs was explained to the patient and consent was obtained. The risks, benefits and alternatives to therapy were discussed in detail. Specifically, the risks of ectropion or eyelid deformity, infection, scarring, bleeding, prolonged wound healing, incomplete removal, allergy to anesthesia, nerve injury and recurrence were addressed. Prior to the procedure, the treatment site was clearly identified and confirmed by the patient. All components of Universal Protocol/PAUSE Rule completed. Date Of Previous Biopsy (Optional): 12/16/2021 Information: Selecting Yes will display possible errors in your note based on the variables you have selected. This validation is only offered as a suggestion for you. PLEASE NOTE THAT THE VALIDATION TEXT WILL BE REMOVED WHEN YOU FINALIZE YOUR NOTE. IF YOU WANT TO FAX A PRELIMINARY NOTE YOU WILL NEED TO TOGGLE THIS TO 'NO' IF YOU DO NOT WANT IT IN YOUR FAXED NOTE. Undermining Location (Optional): at the junction of the superficial and deep fat Area H Indication Text: Tumors in this location are included in Area H (eyelids, eyebrows, nose, lips, chin, ear, pre-auricular, post-auricular, temple, genitalia, hands, feet, ankles and areola).  Tissue conservation is critical in these anatomic locations. Complex Repair And Flap Additional Text (Will Appearing After The Standard Complex Repair Text): The complex repair was not sufficient to completely close the primary defect. The remaining additional defect was repaired with the flap mentioned below. Crescentic Advancement Flap Text: The defect edges were debeveled with a #15 scalpel blade.  Given the location of the defect and the proximity to free margins a crescentic advancement flap was deemed most appropriate.  Using a sterile surgical marker, the appropriate advancement flap was drawn incorporating the defect and placing the expected incisions within the relaxed skin tension lines where possible.    The area thus outlined was incised deep to adipose tissue with a #15 scalpel blade.  The skin margins were undermined to an appropriate distance in all directions utilizing iris scissors. O-T Plasty Text: The defect edges were debeveled with a #15 scalpel blade.  Given the location of the defect, shape of the defect and the proximity to free margins an O-T plasty was deemed most appropriate.  Using a sterile surgical marker, an appropriate O-T plasty was drawn incorporating the defect and placing the expected incisions within the relaxed skin tension lines where possible.    The area thus outlined was incised deep to adipose tissue with a #15 scalpel blade.  The skin margins were undermined to an appropriate distance in all directions utilizing iris scissors. Inflammation Suggestive Of Cancer Camouflage Histology Text: There was a dense lymphocytic infiltrate which prevented adequate histologic evaluation of adjacent structures. Cheiloplasty (Less Than 50%) Text: A decision was made to reconstruct the defect with a  cheiloplasty.  The defect was undermined extensively.  Additional obicularis oris muscle was excised with a 15 blade scalpel.  The defect was converted into a full thickness wedge, of less than 50% of the vertical height of the lip, to facilite a better cosmetic result.  Small vessels were then tied off with 5-0 monocyrl. The obicularis oris, superficial fascia, adipose and dermis were then reapproximated.  After the deeper layers were approximated the epidermis was reapproximated with particular care given to realign the vermillion border. Mohs Method Verbiage: An incision at a 45 degree angle following the standard Mohs approach was done and the specimen was harvested as a microscopic controlled layer. Zygomaticofacial Flap Text: Given the location of the defect, shape of the defect and the proximity to free margins a zygomaticofacial flap was deemed most appropriate for repair.  Using a sterile surgical marker, the appropriate flap was drawn incorporating the defect and placing the expected incisions within the relaxed skin tension lines where possible. The area thus outlined was incised deep to adipose tissue with a #15 scalpel blade with preservation of a vascular pedicle.  The skin margins were undermined to an appropriate distance in all directions utilizing iris scissors.  The flap was then placed into the defect and anchored with interrupted buried subcutaneous sutures. Debridement Text: The wound edges were debrided prior to proceeding with the closure to facilitate wound healing. Distance Of Undermining In Cm (Required): 1 O-Z Plasty Text: The defect edges were debeveled with a #15 scalpel blade.  Given the location of the defect, shape of the defect and the proximity to free margins an O-Z plasty (double transposition flap) was deemed most appropriate.  Using a sterile surgical marker, the appropriate transposition flaps were drawn incorporating the defect and placing the expected incisions within the relaxed skin tension lines where possible.    The area thus outlined was incised deep to adipose tissue with a #15 scalpel blade.  The skin margins were undermined to an appropriate distance in all directions utilizing iris scissors.  Hemostasis was achieved with electrocautery.  The flaps were then transposed into place, one clockwise and the other counterclockwise, and anchored with interrupted buried subcutaneous sutures. Tissue Cultured Epidermal Autograft Text: The defect edges were debeveled with a #15 scalpel blade.  Given the location of the defect, shape of the defect and the proximity to free margins a tissue cultured epidermal autograft was deemed most appropriate.  The graft was then trimmed to fit the size of the defect.  The graft was then placed in the primary defect and oriented appropriately. Closure 4 Information: This tab is for additional flaps and grafts above and beyond our usual structured repairs.  Please note if you enter information here it will not currently bill and you will need to add the billing information manually. Depth Of Tumor Invasion (For Histology): tumor not visualized (deep and peripheral margins are clear of tumor) Referring Physician (Optional): William Orbicularis Oris Muscle Flap Text: The defect edges were debeveled with a #15 scalpel blade.  Given that the defect affected the competency of the oral sphincter an obicularis oris muscle flap was deemed most appropriate to restore this competency and normal muscle function.  Using a sterile surgical marker, an appropriate flap was drawn incorporating the defect. The area thus outlined was incised with a #15 scalpel blade. Split-Thickness Skin Graft Text: The defect edges were debeveled with a #15 scalpel blade.  Given the location of the defect, shape of the defect and the proximity to free margins a split thickness skin graft was deemed most appropriate.  Using a sterile surgical marker, the primary defect shape was transferred to the donor site. The split thickness graft was then harvested.  The skin graft was then placed in the primary defect and oriented appropriately. Scc In Situ Histology Text: Within the epidermis is a full-thickness proliferation of atypical keratinocytes with mitoses. The overlying stratum corneum demonstrates parakeratosis. No invasion is noted. Surgeon: Ayad Presley MD Interpolation Flap Text: A decision was made to reconstruct the defect utilizing an interpolation axial flap and a staged reconstruction.  A telfa template was made of the defect.  This telfa template was then used to outline the interpolation flap.  The donor area for the pedicle flap was then injected with anesthesia.  The flap was excised through the skin and subcutaneous tissue down to the layer of the underlying musculature.  The interpolation flap was carefully excised within this deep plane to maintain its blood supply.  The edges of the donor site were undermined.   The donor site was closed in a primary fashion.  The pedicle was then rotated into position and sutured.  Once the tube was sutured into place, adequate blood supply was confirmed with blanching and refill.  The pedicle was then wrapped with xeroform gauze and dressed appropriately with a telfa and gauze bandage to ensure continued blood supply and protect the attached pedicle. Home Suture Removal Text: Patient was provided instructions on removing sutures and will remove their sutures at home.  If they have any questions or difficulties they will call the office. Rotation Flap Text: The defect edges were debeveled with a #15 scalpel blade.  Given the location of the defect, shape of the defect and the proximity to free margins a rotation flap was deemed most appropriate.  Using a sterile surgical marker, an appropriate rotation flap was drawn incorporating the defect and placing the expected incisions within the relaxed skin tension lines where possible.    The area thus outlined was incised deep to adipose tissue with a #15 scalpel blade.  The skin margins were undermined to an appropriate distance in all directions utilizing iris scissors. Keystone Flap Text: The defect edges were debeveled with a #15 scalpel blade.  Given the location of the defect, shape of the defect a keystone flap was deemed most appropriate.  Using a sterile surgical marker, an appropriate keystone flap was drawn incorporating the defect, outlining the appropriate donor tissue and placing the expected incisions within the relaxed skin tension lines where possible. The area thus outlined was incised deep to adipose tissue with a #15 scalpel blade.  The skin margins were undermined to an appropriate distance in all directions around the primary defect and laterally outward around the flap utilizing iris scissors. No Residual Tumor Seen Histology Text: There were no malignant cells seen in the sections examined. Metatypical Bcc Histology Text: Multiple islands of malignant cells throughout the dermis, exhibiting both basaloid and squamous differentiation. The areas had Basal Cell Carcinoma, with large and rounded basaloid cells, and Squamous Cell Carcinoma, with polygonal squamoid cells with abundant eosinophilic cytoplasm, large nuclei, and prominent nucleoli. Manual Repair Warning Statement: We plan on removing the manually selected variable below in favor of our much easier automatic structured text blocks found in the previous tab. We decided to do this to help make the flow better and give you the full power of structured data. Manual selection is never going to be ideal in our platform and I would encourage you to avoid using manual selection from this point on, especially since I will be sunsetting this feature. It is important that you do one of two things with the customized text below. First, you can save all of the text in a word file so you can have it for future reference. Second, transfer the text to the appropriate area in the Library tab. Lastly, if there is a flap or graft type which we do not have you need to let us know right away so I can add it in before the variable is hidden. No need to panic, we plan to give you roughly 6 months to make the change. Bcc  Nodulocystic Histology Text: Nests of basaloid cells with peripheral palisading associated with a fibromyxoid stroma. Consent 3/Introductory Paragraph: I gave the patient a chance to ask questions they had about the procedure.  Following this I explained the Mohs procedure and consent was obtained. The risks, benefits and alternatives to therapy were discussed in detail. Specifically, the risks of infection, scarring, bleeding, prolonged wound healing, incomplete removal, allergy to anesthesia, nerve injury and recurrence were addressed. Prior to the procedure, the treatment site was clearly identified and confirmed by the patient. All components of Universal Protocol/PAUSE Rule completed. Hemigard Intro: Due to skin fragility and wound tension, it was decided to use HEMIGARD adhesive retention suture devices to permit a linear closure. The skin was cleaned and dried for a 6cm distance away from the wound. Excessive hair, if present, was removed to allow for adhesion. Location Indication Override (Is Already Calculated Based On Selected Body Location): Area H Initial Size Of Lesion: 0.6 Chonodrocutaneous Helical Advancement Flap Text: The defect edges were debeveled with a #15 scalpel blade.  Given the location of the defect and the proximity to free margins a chondrocutaneous helical advancement flap was deemed most appropriate.  Using a sterile surgical marker, the appropriate advancement flap was drawn incorporating the defect and placing the expected incisions within the relaxed skin tension lines where possible.    The area thus outlined was incised deep to adipose tissue with a #15 scalpel blade.  The skin margins were undermined to an appropriate distance in all directions utilizing iris scissors. Nasalis-Muscle-Based Myocutaneous Island Pedicle Flap Text: Using a #15 blade, an incision was made around the donor flap to the level of the nasalis muscle. Wide lateral undermining was then performed in both the subcutaneous plane above the nasalis muscle, and in a submuscular plane just above periosteum. This allowed the formation of a free nasalis muscle axial pedicle (based on the angular artery) which was still attached to the actual cutaneous flap, increasing its mobility and vascular viability. Hemostasis was obtained with pinpoint electrocoagulation. The flap was mobilized into position and the pivotal anchor points positioned and stabilized with buried interrupted sutures. Subcutaneous and dermal tissues were closed in a multilayered fashion with sutures. Tissue redundancies were excised, and the epidermal edges were apposed without significant tension and sutured with sutures. Helical Rim Text: The closure involved the helical rim. Scc Poorly Differentiated Histology Text: Arising from the epidermis is a pleomorphic proliferation of atypical keratinocytes with mitoses. Invasion into the dermis is noted. Paramedian Forehead Flap Text: A decision was made to reconstruct the defect utilizing an interpolation axial flap and a staged reconstruction.  A telfa template was made of the defect.  This telfa template was then used to outline the paramedian forehead pedicle flap.  The donor area for the pedicle flap was then injected with anesthesia.  The flap was excised through the skin and subcutaneous tissue down to the layer of the underlying musculature.  The pedicle flap was carefully excised within this deep plane to maintain its blood supply.  The edges of the donor site were undermined.   The donor site was closed in a primary fashion.  The pedicle was then rotated into position and sutured.  Once the tube was sutured into place, adequate blood supply was confirmed with blanching and refill.  The pedicle was then wrapped with xeroform gauze and dressed appropriately with a telfa and gauze bandage to ensure continued blood supply and protect the attached pedicle. Eye Protection Verbiage: Before proceeding with the stage, a plastic scleral shield was inserted. The globe was anesthetized with a few drops of 1% lidocaine with 1:100,000 epinephrine. Then, an appropriate sized scleral shield was chosen and coated with lacrilube ointment. The shield was gently inserted and left in place for the duration of each stage. After the stage was completed, the shield was gently removed. Z Plasty Text: The lesion was extirpated to the level of the fat with a #15 scalpel blade.  Given the location of the defect, shape of the defect and the proximity to free margins a Z-plasty was deemed most appropriate for repair.  Using a sterile surgical marker, the appropriate transposition arms of the Z-plasty were drawn incorporating the defect and placing the expected incisions within the relaxed skin tension lines where possible.    The area thus outlined was incised deep to adipose tissue with a #15 scalpel blade.  The skin margins were undermined to an appropriate distance in all directions utilizing iris scissors.  The opposing transposition arms were then transposed into place in opposite direction and anchored with interrupted buried subcutaneous sutures. Mart-1 - Positive Histology Text: MART-1 staining demonstrates areas of higher density and clustering of melanocytes with Pagetoid spread upwards within the epidermis. The surgical margins are positive for tumor cells. Banner Transposition Flap Text: The defect edges were debeveled with a #15 scalpel blade.  Given the location of the defect and the proximity to free margins a Banner transposition flap was deemed most appropriate.  Using a sterile surgical marker, an appropriate flap drawn around the defect. The area thus outlined was incised deep to adipose tissue with a #15 scalpel blade.  The skin margins were undermined to an appropriate distance in all directions utilizing iris scissors. Localized Dermabrasion With Wire Brush Text: The patient was draped in routine manner.  Localized dermabrasion using 3 x 17 mm wire brush was performed in routine manner to papillary dermis. This spot dermabrasion is being performed to complete skin cancer reconstruction. It also will eliminate the other sun damaged precancerous cells that are known to be part of the regional effect of a lifetime's worth of sun exposure. This localized dermabrasion is therapeutic and should not be considered cosmetic in any regard. Consent (Spinal Accessory)/Introductory Paragraph: The rationale for Mohs was explained to the patient and consent was obtained. The risks, benefits and alternatives to therapy were discussed in detail. Specifically, the risks of damage to the spinal accessory nerve, infection, scarring, bleeding, prolonged wound healing, incomplete removal, allergy to anesthesia, and recurrence were addressed. Prior to the procedure, the treatment site was clearly identified and confirmed by the patient. All components of Universal Protocol/PAUSE Rule completed. Mohs Case Number:  Bcc Infiltrative Histology Text: Irregularly shaped cords and strands of basaloid keratinocytes infiltrate the dermis with a spiky growth pattern. The cells have scant cytoplasm and round dark nuclei. Mitotic figures and apoptotic bodies are evident. The nuclei at the periphery of the islands have a palisaded arrangement. The islands are associated with a fibromyxoid stroma and there is a cleft formation between some of the islands and stroma. Skin Substitute Text: The defect edges were debeveled with a #15 scalpel blade.  Given the location of the defect, shape of the defect and the proximity to free margins a skin substitute graft was deemed most appropriate.  The graft material was trimmed to fit the size of the defect. The graft was then placed in the primary defect and oriented appropriately. Graft Cartilage Fenestration Text: The cartilage was fenestrated with a 2mm punch biopsy to help facilitate graft survival and healing. Epidermal Sutures: 6-0 Monosof Graft Donor Site Bandage (Optional-Leave Blank If You Don't Want In Note): Steri-strips and a pressure bandage were applied to the donor site. Mixed Nodular And Micronodular Bcc Histology Text: Emanating from the epidermis and infiltrating the dermis are irregularly shaped islands of basaloid keratinocytes. The cells have scant cytoplasm and round dark nuclei. Mitotic figures and apoptotic bodies are evident. The nuclei at the periphery of the islands have a palisaded arrangement. The islands are associated with a fibromyxoid stroma and there is a cleft formation between some of the islands and stroma.  Emanating from the epidermis and infiltrating the dermis are irregularly shaped islands of basaloid keratinocytes. The cells have scant cytoplasm and dark round nuclei. Mitotic figures and apoptotic bodies are evident. The nuclei at the periphery of the islands have a palisaded arrangement. The islands are associated with a fibromyxoid stroma and there is cleft formation between some of the islands and stroma. In areas the tumor breaks up into a small, micronodular, infiltrative growth pattern with deeper extension into the dermis. Tumor Depth: Less than 6mm from granular layer and no invasion beyond the subcutaneous fat Island Pedicle Flap-Requiring Vessel Identification Text: The defect edges were debeveled with a #15 scalpel blade.  Given the location of the defect, shape of the defect and the proximity to free margins an island pedicle advancement flap was deemed most appropriate.  Using a sterile surgical marker, an appropriate advancement flap was drawn, based on the axial vessel mentioned above, incorporating the defect, outlining the appropriate donor tissue and placing the expected incisions within the relaxed skin tension lines where possible.    The area thus outlined was incised deep to adipose tissue with a #15 scalpel blade.  The skin margins were undermined to an appropriate distance in all directions around the primary defect and laterally outward around the island pedicle utilizing iris scissors.  There was minimal undermining beneath the pedicle flap. Same Histology In Subsequent Stages Text: The pattern and morphology of the tumor is as described in the first stage. Consent (Temporal Branch)/Introductory Paragraph: The rationale for Mohs was explained to the patient and consent was obtained. The risks, benefits and alternatives to therapy were discussed in detail. Specifically, the risks of damage to the temporal branch of the facial nerve, infection, scarring, bleeding, prolonged wound healing, incomplete removal, allergy to anesthesia, and recurrence were addressed. Prior to the procedure, the treatment site was clearly identified and confirmed by the patient. All components of Universal Protocol/PAUSE Rule completed. Repair Type: Complex Repair O-Z Flap Text: The defect edges were debeveled with a #15 scalpel blade.  Given the location of the defect, shape of the defect and the proximity to free margins an O-Z flap was deemed most appropriate.  Using a sterile surgical marker, an appropriate transposition flap was drawn incorporating the defect and placing the expected incisions within the relaxed skin tension lines where possible. The area thus outlined was incised deep to adipose tissue with a #15 scalpel blade.  The skin margins were undermined to an appropriate distance in all directions utilizing iris scissors. Alar Island Pedicle Flap Text: The defect edges were debeveled with a #15 scalpel blade.  Given the location of the defect, shape of the defect and the proximity to the alar rim an island pedicle advancement flap was deemed most appropriate.  Using a sterile surgical marker, an appropriate advancement flap was drawn incorporating the defect, outlining the appropriate donor tissue and placing the expected incisions within the nasal ala running parallel to the alar rim. The area thus outlined was incised with a #15 scalpel blade.  The skin margins were undermined minimally to an appropriate distance in all directions around the primary defect and laterally outward around the island pedicle utilizing iris scissors.  There was minimal undermining beneath the pedicle flap. Retention Suture Text: Retention sutures were placed to support the closure and prevent dehiscence. Suturegard Body: The suture ends were repeatedly re-tightened and re-clamped to achieve the desired tissue expansion. Advancement Flap (Single) Text: The defect edges were debeveled with a #15 scalpel blade.  Given the location of the defect and the proximity to free margins a single advancement flap was deemed most appropriate.  Using a sterile surgical marker, an appropriate advancement flap was drawn incorporating the defect and placing the expected incisions within the relaxed skin tension lines where possible.    The area thus outlined was incised deep to adipose tissue with a #15 scalpel blade.  The skin margins were undermined to an appropriate distance in all directions utilizing iris scissors. Rhomboid Transposition Flap Text: The defect edges were debeveled with a #15 scalpel blade.  Given the location of the defect and the proximity to free margins a rhomboid transposition flap was deemed most appropriate.  Using a sterile surgical marker, an appropriate rhomboid flap was drawn incorporating the defect.    The area thus outlined was incised deep to adipose tissue with a #15 scalpel blade.  The skin margins were undermined to an appropriate distance in all directions utilizing iris scissors. Non-Graft Cartilage Fenestration Text: The cartilage was fenestrated with a 2mm punch biopsy to help facilitate healing. Post-Care Instructions: I reviewed with the patient in detail post-care instructions. Patient is not to engage in any heavy lifting, exercise, or swimming for the next 7 days. Should the patient develop any fevers, chills, bleeding, severe pain patient will contact the office immediately. Retention Suture Bite Size: 3 mm Ear Star Wedge Flap Text: The defect edges were debeveled with a #15 blade scalpel.  Given the location of the defect and the proximity to free margins (helical rim) an ear star wedge flap was deemed most appropriate.  Using a sterile surgical marker, the appropriate flap was drawn incorporating the defect and placing the expected incisions between the helical rim and antihelix where possible.  The area thus outlined was incised through and through with a #15 scalpel blade. Partial Purse String (Intermediate) Text: Given the location of the defect and the characteristics of the surrounding skin an intermediate purse string closure was deemed most appropriate.  Undermining was performed circumfirentially around the surgical defect.  A purse string suture was then placed and tightened. Wound tension only allowed a partial closure of the circular defect. Cheek-To-Nose Interpolation Flap Text: A decision was made to reconstruct the defect utilizing an interpolation axial flap and a staged reconstruction.  A telfa template was made of the defect.  This telfa template was then used to outline the Cheek-To-Nose Interpolation flap.  The donor area for the pedicle flap was then injected with anesthesia.  The flap was excised through the skin and subcutaneous tissue down to the layer of the underlying musculature.  The interpolation flap was carefully excised within this deep plane to maintain its blood supply.  The edges of the donor site were undermined.   The donor site was closed in a primary fashion.  The pedicle was then rotated into position and sutured.  Once the tube was sutured into place, adequate blood supply was confirmed with blanching and refill.  The pedicle was then wrapped with xeroform gauze and dressed appropriately with a telfa and gauze bandage to ensure continued blood supply and protect the attached pedicle. Nasal Turnover Hinge Flap Text: The defect edges were debeveled with a #15 scalpel blade.  Given the size, depth, location of the defect and the defect being full thickness a nasal turnover hinge flap was deemed most appropriate.  Using a sterile surgical marker, an appropriate hinge flap was drawn incorporating the defect. The area thus outlined was incised with a #15 scalpel blade. The flap was designed to recreate the nasal mucosal lining and the alar rim. The skin margins were undermined to an appropriate distance in all directions utilizing iris scissors. Full Thickness Lip Wedge Repair (Flap) Text: Given the location of the defect and the proximity to free margins a full thickness wedge repair was deemed most appropriate.  Using a sterile surgical marker, the appropriate repair was drawn incorporating the defect and placing the expected incisions perpendicular to the vermillion border.  The vermillion border was also meticulously outlined to ensure appropriate reapproximation during the repair.  The area thus outlined was incised through and through with a #15 scalpel blade.  The muscularis and dermis were reaproximated with deep sutures following hemostasis. Care was taken to realign the vermillion border before proceeding with the superficial closure.  Once the vermillion was realigned the superfical and mucosal closure was finished. Dermal Autograft Text: The defect edges were debeveled with a #15 scalpel blade.  Given the location of the defect, shape of the defect and the proximity to free margins a dermal autograft was deemed most appropriate.  Using a sterile surgical marker, the primary defect shape was transferred to the donor site. The area thus outlined was incised deep to adipose tissue with a #15 scalpel blade.  The harvested graft was then trimmed of adipose and epidermal tissue until only dermis was left.  The skin graft was then placed in the primary defect and oriented appropriately. Bcc  Nodular Histology Text: Emanating from the epidermis and infiltrating the dermis are irregularly shaped islands of basaloid keratinocytes. The cells have scant cytoplasm and round dark nuclei. Mitotic figures and apoptotic bodies are evident. The nuclei at the periphery of the islands have a palisaded arrangement. The islands are associated with a fibromyxoid stroma and there is a cleft formation between some of the islands and stroma. Deep Sutures: 5-0 Polysorb Unna Boot Text: An Unna boot was placed to help immobilize the limb and facilitate more rapid healing. Consent 2/Introductory Paragraph: Mohs surgery was explained to the patient and consent was obtained. The risks, benefits and alternatives to therapy were discussed in detail. Specifically, the risks of infection, scarring, bleeding, prolonged wound healing, incomplete removal, allergy to anesthesia, nerve injury and recurrence were addressed. Prior to the procedure, the treatment site was clearly identified and confirmed by the patient. All components of Universal Protocol/PAUSE Rule completed. Consent (Marginal Mandibular)/Introductory Paragraph: The rationale for Mohs was explained to the patient and consent was obtained. The risks, benefits and alternatives to therapy were discussed in detail. Specifically, the risks of damage to the marginal mandibular branch of the facial nerve, infection, scarring, bleeding, prolonged wound healing, incomplete removal, allergy to anesthesia, and recurrence were addressed. Prior to the procedure, the treatment site was clearly identified and confirmed by the patient. All components of Universal Protocol/PAUSE Rule completed. Patient Name (Optional- Will Render 'the Patient' If Blank): Renita Almaraz Advancement-Rotation Flap Text: The defect edges were debeveled with a #15 scalpel blade.  Given the location of the defect, shape of the defect and the proximity to free margins an advancement-rotation flap was deemed most appropriate.  Using a sterile surgical marker, an appropriate flap was drawn incorporating the defect and placing the expected incisions within the relaxed skin tension lines where possible. The area thus outlined was incised deep to adipose tissue with a #15 scalpel blade.  The skin margins were undermined to an appropriate distance in all directions utilizing iris scissors. Suture Removal: 7 days Unique Flap 1 Name: Fernandez Elder Island Pedicle Flap With Canthal Suspension Text: The defect edges were debeveled with a #15 scalpel blade.  Given the location of the defect, shape of the defect and the proximity to free margins an island pedicle advancement flap was deemed most appropriate.  Using a sterile surgical marker, an appropriate advancement flap was drawn incorporating the defect, outlining the appropriate donor tissue and placing the expected incisions within the relaxed skin tension lines where possible. The area thus outlined was incised deep to adipose tissue with a #15 scalpel blade.  The skin margins were undermined to an appropriate distance in all directions around the primary defect and laterally outward around the island pedicle utilizing iris scissors.  There was minimal undermining beneath the pedicle flap. A suspension suture was placed in the canthal tendon to prevent tension and prevent ectropion. Suturegard Intro: Intraoperative tissue expansion was performed, utilizing the SUTUREGARD device, in order to reduce wound tension. Scc Moderately Differentiated Histology Text: Arising from the epidermis is a proliferation of atypical keratinocytes with mitoses. Invasion into the dermis is noted. In areas the tumor is keratin producing and in other areas the tumor is less well differentiated. Posterior Auricular Interpolation Flap Text: A decision was made to reconstruct the defect utilizing an interpolation axial flap and a staged reconstruction.  A telfa template was made of the defect.  This telfa template was then used to outline the posterior auricular interpolation flap.  The donor area for the pedicle flap was then injected with anesthesia.  The flap was excised through the skin and subcutaneous tissue down to the layer of the underlying musculature.  The pedicle flap was carefully excised within this deep plane to maintain its blood supply.  The edges of the donor site were undermined.   The donor site was closed in a primary fashion.  The pedicle was then rotated into position and sutured.  Once the tube was sutured into place, adequate blood supply was confirmed with blanching and refill.  The pedicle was then wrapped with xeroform gauze and dressed appropriately with a telfa and gauze bandage to ensure continued blood supply and protect the attached pedicle. Dressing (No Sutures): dry sterile dressing Postop Diagnosis: same Bcc Histology Text: There were numerous aggregates of basaloid cells. V-Y Flap Text: The defect edges were debeveled with a #15 scalpel blade.  Given the location of the defect, shape of the defect and the proximity to free margins a V-Y flap was deemed most appropriate.  Using a sterile surgical marker, an appropriate advancement flap was drawn incorporating the defect and placing the expected incisions within the relaxed skin tension lines where possible.    The area thus outlined was incised deep to adipose tissue with a #15 scalpel blade.  The skin margins were undermined to an appropriate distance in all directions utilizing iris scissors. Consent (Scalp)/Introductory Paragraph: The rationale for Mohs was explained to the patient and consent was obtained. The risks, benefits and alternatives to therapy were discussed in detail. Specifically, the risks of changes in hair growth pattern secondary to repair, infection, scarring, bleeding, prolonged wound healing, incomplete removal, allergy to anesthesia, nerve injury and recurrence were addressed. Prior to the procedure, the treatment site was clearly identified and confirmed by the patient. All components of Universal Protocol/PAUSE Rule completed. Complex Repair And Graft Additional Text (Will Appearing After The Standard Complex Repair Text): The complex repair was not sufficient to completely close the primary defect. The remaining additional defect was repaired with the graft mentioned below. Bilateral Helical Rim Advancement Flap Text: The defect edges were debeveled with a #15 blade scalpel.  Given the location of the defect and the proximity to free margins (helical rim) a bilateral helical rim advancement flap was deemed most appropriate.  Using a sterile surgical marker, the appropriate advancement flaps were drawn incorporating the defect and placing the expected incisions between the helical rim and antihelix where possible.  The area thus outlined was incised through and through with a #15 scalpel blade.  With a skin hook and iris scissors, the flaps were gently and sharply undermined and freed up. Partial Purse String (Simple) Text: Given the location of the defect and the characteristics of the surrounding skin a simple purse string closure was deemed most appropriate.  Undermining was performed circumfirentially around the surgical defect.  A purse string suture was then placed and tightened. Wound tension only allowed a partial closure of the circular defect. Staged Advancement Flap Text: The defect edges were debeveled with a #15 scalpel blade.  Given the location of the defect, shape of the defect and the proximity to free margins a staged advancement flap was deemed most appropriate.  Using a sterile surgical marker, an appropriate advancement flap was drawn incorporating the defect and placing the expected incisions within the relaxed skin tension lines where possible. The area thus outlined was incised deep to adipose tissue with a #15 scalpel blade.  The skin margins were undermined to an appropriate distance in all directions utilizing iris scissors. Mastoid Interpolation Flap Text: A decision was made to reconstruct the defect utilizing an interpolation axial flap and a staged reconstruction.  A telfa template was made of the defect.  This telfa template was then used to outline the mastoid interpolation flap.  The donor area for the pedicle flap was then injected with anesthesia.  The flap was excised through the skin and subcutaneous tissue down to the layer of the underlying musculature.  The pedicle flap was carefully excised within this deep plane to maintain its blood supply.  The edges of the donor site were undermined.   The donor site was closed in a primary fashion.  The pedicle was then rotated into position and sutured.  Once the tube was sutured into place, adequate blood supply was confirmed with blanching and refill.  The pedicle was then wrapped with xeroform gauze and dressed appropriately with a telfa and gauze bandage to ensure continued blood supply and protect the attached pedicle. Bcc  Morpheaform/Sclerosing Histology Text: Irregularly shaped cords and strands of basaloid keratinocytes are present within the dermis. In areas the cells assume a single cell strand formation with a highly-infiltrative growth pattern. The cells have scant cytoplasm and round dark nuclei. There are nodular aggregates of darkly staining basophilic cells exhibiting peripheral palisading and stromal retraction. The islands are associated with a dense morpheic stroma. Brow Lift Text: A midfrontal incision was made medially to the defect to allow access to the tissues just superior to the left eyebrow. Following careful dissection inferiorly in a supraperiosteal plane to the level of the left eyebrow, several 3-0 monocryl sutures were used to resuspend the eyebrow orbicularis oculi muscular unit to the superior frontal bone periosteum. This resulted in an appropriate reapproximation of static eyebrow symmetry and correction of the left brow ptosis. Consent 1/Introductory Paragraph: The rationale for Mohs was explained to the patient and consent was obtained. The risks, benefits and alternatives to therapy were discussed in detail. Specifically, the risks of infection, scarring, bleeding, prolonged wound healing, incomplete removal, allergy to anesthesia, nerve injury and recurrence were addressed. Prior to the procedure, the treatment site was clearly identified and confirmed by the patient. All components of Universal Protocol/PAUSE Rule completed. Double Island Pedicle Flap Text: The defect edges were debeveled with a #15 scalpel blade.  Given the location of the defect, shape of the defect and the proximity to free margins a double island pedicle advancement flap was deemed most appropriate.  Using a sterile surgical marker, an appropriate advancement flap was drawn incorporating the defect, outlining the appropriate donor tissue and placing the expected incisions within the relaxed skin tension lines where possible.    The area thus outlined was incised deep to adipose tissue with a #15 scalpel blade.  The skin margins were undermined to an appropriate distance in all directions around the primary defect and laterally outward around the island pedicle utilizing iris scissors.  There was minimal undermining beneath the pedicle flap. Hatchet Flap Text: The defect edges were debeveled with a #15 scalpel blade.  Given the location of the defect, shape of the defect and the proximity to free margins a hatchet flap was deemed most appropriate.  Using a sterile surgical marker, an appropriate hatchet flap was drawn incorporating the defect and placing the expected incisions within the relaxed skin tension lines where possible.    The area thus outlined was incised deep to adipose tissue with a #15 scalpel blade.  The skin margins were undermined to an appropriate distance in all directions utilizing iris scissors. Mixed Nodular And Infiltrative Bcc Histology Text: Emanating from the epidermis and infiltrating the dermis are irregularly shaped islands of basaloid keratinocytes. The cells have scant cytoplasm and round dark nuclei. Mitotic figures and apoptotic bodies are evident. The nuclei at the periphery of the islands have a palisaded arrangement. The islands are associated with a fibromyxoid stroma and there is a cleft formation between some of the islands and stroma.  Irregularly shaped cords and strands of basaloid keratinocytes infiltrate the dermis with a spiky growth pattern. The cells have scant cytoplasm and round dark nuclei. Mitotic figures and apoptotic bodies are evident. The nuclei at the periphery of the islands have a palisaded arrangement. The islands are associated with a fibromyxoid stroma and there is a cleft formation between some of the islands and stroma.

## 2022-02-10 PROCEDURE — 99497 ADVNCD CARE PLAN 30 MIN: CPT

## 2022-02-10 PROCEDURE — 99233 SBSQ HOSP IP/OBS HIGH 50: CPT

## 2022-02-10 PROCEDURE — 99232 SBSQ HOSP IP/OBS MODERATE 35: CPT

## 2022-02-10 RX ORDER — RISPERIDONE 4 MG/1
0.5 TABLET ORAL DAILY
Refills: 0 | Status: ACTIVE | OUTPATIENT
Start: 2022-02-10 | End: 2023-01-09

## 2022-02-10 RX ORDER — MINERAL OIL
133 OIL (ML) MISCELLANEOUS ONCE
Refills: 0 | Status: ACTIVE | OUTPATIENT
Start: 2022-02-10 | End: 2023-01-09

## 2022-02-10 RX ORDER — RISPERIDONE 4 MG/1
0.25 TABLET ORAL ONCE
Refills: 0 | Status: COMPLETED | OUTPATIENT
Start: 2022-02-10 | End: 2022-02-10

## 2022-02-10 RX ADMIN — RISPERIDONE 0.25 MILLIGRAM(S): 4 TABLET ORAL at 16:32

## 2022-02-10 RX ADMIN — Medication 1 DROP(S): at 06:22

## 2022-02-10 RX ADMIN — Medication 975 MILLIGRAM(S): at 17:35

## 2022-02-10 RX ADMIN — ATORVASTATIN CALCIUM 20 MILLIGRAM(S): 80 TABLET, FILM COATED ORAL at 21:48

## 2022-02-10 RX ADMIN — Medication 325 MILLIGRAM(S): at 11:13

## 2022-02-10 RX ADMIN — CELECOXIB 200 MILLIGRAM(S): 200 CAPSULE ORAL at 11:13

## 2022-02-10 RX ADMIN — FENTANYL CITRATE 1 PATCH: 50 INJECTION INTRAVENOUS at 22:14

## 2022-02-10 RX ADMIN — Medication 1 DROP(S): at 22:26

## 2022-02-10 RX ADMIN — Medication 975 MILLIGRAM(S): at 06:22

## 2022-02-10 RX ADMIN — POLYETHYLENE GLYCOL 3350 17 GRAM(S): 17 POWDER, FOR SOLUTION ORAL at 06:10

## 2022-02-10 RX ADMIN — RISPERIDONE 0.25 MILLIGRAM(S): 4 TABLET ORAL at 06:10

## 2022-02-10 RX ADMIN — Medication 975 MILLIGRAM(S): at 21:48

## 2022-02-10 RX ADMIN — POLYETHYLENE GLYCOL 3350 17 GRAM(S): 17 POWDER, FOR SOLUTION ORAL at 17:05

## 2022-02-10 RX ADMIN — CELECOXIB 200 MILLIGRAM(S): 200 CAPSULE ORAL at 12:13

## 2022-02-10 RX ADMIN — Medication 975 MILLIGRAM(S): at 06:11

## 2022-02-10 RX ADMIN — SENNA PLUS 2 TABLET(S): 8.6 TABLET ORAL at 21:49

## 2022-02-10 RX ADMIN — Medication 3 MILLIGRAM(S): at 22:14

## 2022-02-10 RX ADMIN — Medication 500 MILLIGRAM(S): at 17:04

## 2022-02-10 RX ADMIN — Medication 25 MICROGRAM(S): at 06:10

## 2022-02-10 RX ADMIN — ENOXAPARIN SODIUM 40 MILLIGRAM(S): 100 INJECTION SUBCUTANEOUS at 11:13

## 2022-02-10 RX ADMIN — RISPERIDONE 0.5 MILLIGRAM(S): 4 TABLET ORAL at 22:26

## 2022-02-10 RX ADMIN — TAMSULOSIN HYDROCHLORIDE 0.4 MILLIGRAM(S): 0.4 CAPSULE ORAL at 21:48

## 2022-02-10 RX ADMIN — Medication 81 MILLIGRAM(S): at 11:13

## 2022-02-10 RX ADMIN — Medication 1 DROP(S): at 17:07

## 2022-02-10 RX ADMIN — Medication 975 MILLIGRAM(S): at 22:13

## 2022-02-10 RX ADMIN — Medication 1 DROP(S): at 11:13

## 2022-02-10 RX ADMIN — Medication 500 MILLIGRAM(S): at 06:11

## 2022-02-10 RX ADMIN — Medication 975 MILLIGRAM(S): at 16:35

## 2022-02-10 RX ADMIN — FENTANYL CITRATE 1 PATCH: 50 INJECTION INTRAVENOUS at 07:02

## 2022-02-10 NOTE — PROGRESS NOTE ADULT - ASSESSMENT
Assessment and Plan:   89 yo admitted s/p fall sustaining left hip fracture s/p repair with IM nailing complicated by hyperactive delirium with agitation.    Hyperactive Delirium  Underlying Advanced dementia,   1:1 with intermittent agitation and frequent reorientation  multifactorial etiologies including dementia, hospitalization, pain related  Risperidone 0.25mg BID  increased  AM dose  now 0.5mg 2x/d PRN quetiapine   Avoiding using Seroquel , family visiting early afternoon, need to see her when behaviors are severe    Acute Pain   Fentanyl patch 12mcg + ATC celecoxib (both started on 2/8)  APAP 975mg ATC    Constipation  Patient has been getting more uncomfortable, she is asking to go to [facility]  Constipation may also be a factor in increasing confusion and restlessnesss  Dulcolax suppository follow with Fleet oil enema if needed    S/P Fall with Lt Hip Fracture  s/p IM nailing 2/1 by ortho  -Pain control, PT/OT    Advance Care Planning  HCP Gabe Mendosa  visited yesterday   DNR with trial of NIV, MOLST completed by hospitalist 2/7    Plan: [facility] once medically optimized, if mental status improves.  Will benefit from hospice services vs home care/palliative based program after discharge from [facility].  Prognosis guarded and high risk of rehospitalization given comorbidities, progressive debility, frailty.

## 2022-02-10 NOTE — PROGRESS NOTE ADULT - ASSESSMENT
89 yr old female with dementia, hypothyroidism, TIA presented after a fall, imaging revealed an acute left intertrochanteric fracture, labs with WBC 18 on admission. She is s/p IM nail of left hip. Post operative course was complicated by anemia, Hb 7.4. PRBC transfusion was given. Also developed urinary retention requiring Stover placement. She developed agitation and pulled out Stover, required constant observation. Her Hb did not improve, was 7.4 on 2/3, hence additional PRBC ordered and CT abdomen/pelvis with hip hematoma.  Hgb has been stable.  S/p IMN on 2/1.      1. Acute comminuted intertrochanteric fracture due to fall   s/p IM Nails on feb 1   for [facility] however on 1 :1 due to confusion , pulling on stover and lines    palliative care team follow up appreciated ; pain meds adjusted.  Appears comfortable.   cont pt/ot daily as able     2.  Dementia  with delirium  with intermittent  episode of agitation , impulsive behaviours   pulling on lines and stover   will continue 1 :1   cont risperidone , seroquel prn   pain meds.  Voiding trial.      3- Urinary retention stover in place   cont void trial removal once more ambulatory  cont flomax     4- Vit d deficiency    started ergocalciferol 77777 unit weekly     5- Constipation - bowel regimen.  KUB ordered cont miralxa senna  will give lactulose if still no BM by evening add enema     6-hx TIA:   On aspirin and statin    7-Hypothyroidism: Continue Synthroid    8--- Severe protein manjeet mlanutrition   cont diet and ensure   MVI with minerals     9- Anemia post op   iron studies reviewed   cont  ferrous sulfate vit c daily   Hb stable       DVT ppx - on heparin     Code/social - MOLST completed DNR   niece is the HCP Navya, cell: [phone]    89 yr old female with dementia, hypothyroidism, TIA presented after a fall, imaging revealed an acute left intertrochanteric fracture, labs with WBC 18 on admission. She is s/p IM nail of left hip. Post operative course was complicated by anemia, Hb 7.4. PRBC transfusion was given. Also developed urinary retention requiring Stover placement. She developed agitation and pulled out Stover, required constant observation. Her Hb did not improve, was 7.4 on 2/3, hence additional PRBC ordered and CT abdomen/pelvis with hip hematoma.  Hgb has been stable.  S/p IMN on 2/1.      1. Acute comminuted intertrochanteric fracture due to fall   s/p IM Nails on feb 1   for [facility] however on 1 :1 due to confusion , pulling on stover and lines    palliative care team follow up appreciated ; pain meds adjusted.  Appears comfortable.   cont pt/ot daily as able.  Voiding trial in am.     2.  Dementia  with delirium  with intermittent  episode of agitation , impulsive behaviours   pulling on lines and stover   will continue 1 :1   cont risperidone , seroquel prn   pain meds.      3- Urinary retention stover in place   cont void trial removal once more ambulatory  cont flomax     4- Vit d deficiency    started ergocalciferol 62798 unit weekly     5- Constipation - bowel regimen.  KUB ordered cont miralxa senna  will give lactulose if still no BM by evening add enema     6-hx TIA:   On aspirin and statin    7-Hypothyroidism: Continue Synthroid    8--- Severe protein manjeet mlanutrition   cont diet and ensure   MVI with minerals     9- Anemia post op   iron studies reviewed   cont  ferrous sulfate vit c daily   Hb stable       DVT ppx - on heparin     Code/social - MOLST completed DNR   niece is the HCP   Navya, cell: [phone]

## 2022-02-10 NOTE — PROGRESS NOTE ADULT - SUBJECTIVE AND OBJECTIVE BOX
Patient: DANISH MICHAELS [id number] 89y Female                            Hospitalist Attending Note    Seen with aide at bedside.  States pt slept well last night, restless this am.   Pt offers no complaints.   T 100.5 720pm last night, no recurrence.     ____________________PHYSICAL EXAM:  GENERAL:  NAD Alert and Oriented x 1 to person confused.   HEENT: NCAT  CARDIOVASCULAR:  S1, S2  LUNGS: CTAB  ABDOMEN:  soft, (-) tenderness, (-) distension, (+) bowel sounds, (-) guarding, (-) rebound (-) rigidity  EXTREMITIES:  no cyanosis / clubbing / edema.  L hip dressing in pladce.   NEURO: strength symmetric.   ____________________       VITALS:  Vital Signs Last 24 Hrs  T(C): 36.8 (10 Feb 2022 10:32), Max: 38.1 (09 Feb 2022 19:20)  T(F): 98.2 (10 Feb 2022 10:32), Max: 100.5 (09 Feb 2022 19:20)  HR: 85 (10 Feb 2022 10:32) (85 - 105)  BP: 110/62 (10 Feb 2022 10:32) (99/57 - 123/71)  BP(mean): --  RR: 18 (10 Feb 2022 10:32) (17 - 18)  SpO2: 92% (10 Feb 2022 10:32) (90% - 92%) Daily     Daily   CAPILLARY BLOOD GLUCOSE        I&O's Summary    09 Feb 2022 07:01  -  10 Feb 2022 07:00  --------------------------------------------------------  IN: 120 mL / OUT: 850 mL / NET: -730 mL        HISTORY:  PAST MEDICAL & SURGICAL HISTORY:  HTN (hypertension)    Dementia    Osteoporosis    Arthritis    Hypothyroidism    Degeneration macular    Poor historian    [place]las (hard of hearing)    Allergies    No Known Allergies    Intolerances       LABS:                        9.0    9.93  )-----------( 310      ( 09 Feb 2022 05:26 )             27.9     02-09    140  |  106  |  26.5<H>  ----------------------------<  121<H>  4.3   |  23.0  |  0.67    Ca    8.5<L>      09 Feb 2022 05:26  Phos  3.4     02-09                    MEDICATIONS:  MEDICATIONS  (STANDING):  acetaminophen     Tablet .. 975 milliGRAM(s) Oral every 8 hours  artificial  tears Solution 1 Drop(s) Right EYE every 6 hours  ascorbic acid 500 milliGRAM(s) Oral two times a day  aspirin  chewable 81 milliGRAM(s) Oral daily  atorvastatin 20 milliGRAM(s) Oral at bedtime  celecoxib 200 milliGRAM(s) Oral daily  enoxaparin Injectable 40 milliGRAM(s) SubCutaneous daily  ergocalciferol 75091 Unit(s) Oral <User Schedule>  fentaNYL   Patch  12 MICROgram(s)/Hr 1 Patch Transdermal every 72 hours  ferrous    sulfate 325 milliGRAM(s) Oral daily  levothyroxine 25 MICROGram(s) Oral daily  melatonin 3 milliGRAM(s) Oral at bedtime  polyethylene glycol 3350 17 Gram(s) Oral two times a day  risperiDONE   Tablet 0.5 milliGRAM(s) Oral at bedtime  risperiDONE  Disintegrating Tablet 0.5 milliGRAM(s) Oral daily  risperiDONE  Disintegrating Tablet 0.25 milliGRAM(s) Oral once  senna 2 Tablet(s) Oral at bedtime  tamsulosin 0.4 milliGRAM(s) Oral at bedtime  tranexamic acid IVPB 1000 milliGRAM(s) IV Intermittent once    MEDICATIONS  (PRN):  bisacodyl Suppository 10 milliGRAM(s) Rectal daily PRN Constipation  mineral oil enema 133 milliLiter(s) Rectal once PRN persistent constipation  QUEtiapine 12.5 milliGRAM(s) Oral every 8 hours PRN restlessness

## 2022-02-10 NOTE — GOALS OF CARE CONVERSATION - ADVANCED CARE PLANNING - WHAT MATTERS MOST
THAT HER PAIN BE MANAGED  hER MENTAL STATE IMPROVEO SHE CAN BE ACCEPTED AT [facility] GET BACK ON HER FEET AGAIN.  MAYBE EVEN retunr to [facility], she was very happy there

## 2022-02-10 NOTE — PROGRESS NOTE ADULT - SUBJECTIVE AND OBJECTIVE BOX
OVERNIGHT EVENTS:  Patient slept well last night  Medicated with Seroquel yesterday, was sleepy when family visited, during which they were concerned, but not understanding how servere her hyperactive Delirium symptoms have been.  Coming in today at 1300 hrs  Patient just fell asleep after a very active restless morning.  C/O needing to have a BM- she is constipated  LLE swollen and discolored, acute pain responding to analgesic plane  ROS not possible due to Mental status    Present Symptoms:     Dyspnea: none  Nausea/Vomiting: No  Anxiety:  Yes agitated  Depression:  No  Fatigue: Yes   Loss of appetite: Yes   Constipation: yes bowel regime ordered today    Pain: L hip and upper extremity  site of hip nailing            Character-            Duration-            Effect-            Factors-            Frequency-            Location-            Severity-    Pain AD Score:  4-6/10 with movement    Review of Systems: Reviewed                       Unable to obtain due to poor mentation     MEDICATIONS  (STANDING):  acetaminophen     Tablet .. 975 milliGRAM(s) Oral every 8 hours  artificial  tears Solution 1 Drop(s) Right EYE every 6 hours  ascorbic acid 500 milliGRAM(s) Oral two times a day  aspirin  chewable 81 milliGRAM(s) Oral daily  atorvastatin 20 milliGRAM(s) Oral at bedtime  celecoxib 200 milliGRAM(s) Oral daily  enoxaparin Injectable 40 milliGRAM(s) SubCutaneous daily  ergocalciferol 04221 Unit(s) Oral <User Schedule>  fentaNYL   Patch  12 MICROgram(s)/Hr 1 Patch Transdermal every 72 hours  ferrous    sulfate 325 milliGRAM(s) Oral daily  levothyroxine 25 MICROGram(s) Oral daily  melatonin 3 milliGRAM(s) Oral at bedtime  polyethylene glycol 3350 17 Gram(s) Oral two times a day  risperiDONE   Tablet 0.5 milliGRAM(s) Oral at bedtime  risperiDONE  Disintegrating Tablet 0.5 milliGRAM(s) Oral daily  risperiDONE  Disintegrating Tablet 0.25 milliGRAM(s) Oral once  senna 2 Tablet(s) Oral at bedtime  tamsulosin 0.4 milliGRAM(s) Oral at bedtime  tranexamic acid IVPB 1000 milliGRAM(s) IV Intermittent once    MEDICATIONS  (PRN):  bisacodyl Suppository 10 milliGRAM(s) Rectal daily PRN Constipation  mineral oil enema 133 milliLiter(s) Rectal once PRN persistent constipation  QUEtiapine 12.5 milliGRAM(s) Oral every 8 hours PRN restlessness    PHYSICAL EXAM:    Vital Signs Last 24 Hrs  T(C): 36.8 (10 Feb 2022 10:32), Max: 38.1 (09 Feb 2022 19:20)  T(F): 98.2 (10 Feb 2022 10:32), Max: 100.5 (09 Feb 2022 19:20)  HR: 85 (10 Feb 2022 10:32) (85 - 105)  BP: 110/62 (10 Feb 2022 10:32) (99/57 - 123/71)  BP(mean): --  RR: 18 (10 Feb 2022 10:32) (17 - 18)  SpO2: 92% (10 Feb 2022 10:32) (90% - 92%)    General: alert  oriented x _self___ sleep deprived due to prolonged Delirium                  agitated restless when awake cachexia  verbal      Karnofsky:  %20-30    HEENT: dry mouth     Lungs: comfortable    CV: normal     GI: normal  distended                 constipation  last BM: ?    : incontinent stover    MSK weakness  edema               bedbound/    Skin: _  no rash    LABS:                          9.0    9.93  )-----------( 310      ( 09 Feb 2022 05:26 )             27.9     02-09    140  |  106  |  26.5<H>  ----------------------------<  121<H>  4.3   |  23.0  |  0.67    Ca    8.5<L>      09 Feb 2022 05:26  Phos  3.4     02-09    I&O's Summary    09 Feb 2022 07:01  -  10 Feb 2022 07:00  --------------------------------------------------------  IN: 120 mL / OUT: 850 mL / NET: -730 mL    RADIOLOGY & ADDITIONAL STUDIES:    IMPRESSION:  Post traumatic/postoperative changes in the left hip status post ORIF of   intertrochanteric fracture with areas of intramuscular hematoma again   identified  Minimal bilateral pleural effusions        --- End of Report ---      CHUNG HAMLIN MD; Attending Radiologist  This document has been electronically signed. Feb  3 2022  7:43PM      ADVANCE DIRECTIVES/TREATMENT PREFERENCES:  DNR YES   Completed on:                     MOLST  YES    Completed on: by hospitalist 2/7  Living Will  NO   Completed on:    ADVANCE DIRECTIVES/TREATMENT PREFERENCES:  DNR YES   Completed on:                     MOLST  YES   Completed on:  Living Will   NO   Completed on: OVERNIGHT EVENTS:  Patient slept well last night  Medicated with Seroquel yesterday, was sleepy when family visited, during which they were concerned, but not understanding how servere her hyperactive Delirium symptoms have been.  Coming in today at 1300 hrs  Patient just fell asleep after a very active restless morning.  C/O needing to have a BM- she is constipated  LLE swollen and discolored, acute pain responding to analgesic plane  ROS not possible due to Mental status    Present Symptoms:     Dyspnea: none  Nausea/Vomiting: No  Anxiety:  Yes agitated  Depression:  No  Fatigue: Yes   Loss of appetite: Yes   Constipation: yes bowel regime ordered today    Pain: L hip and upper extremity  site of hip nailing            Character-            Duration-            Effect-            Factors-            Frequency-            Location-            Severity-    Pain AD Score:  4-6/10 with movement    Review of Systems: Reviewed                       Unable to obtain due to poor mentation     MEDICATIONS  (STANDING):  acetaminophen     Tablet .. 975 milliGRAM(s) Oral every 8 hours  artificial  tears Solution 1 Drop(s) Right EYE every 6 hours  ascorbic acid 500 milliGRAM(s) Oral two times a day  aspirin  chewable 81 milliGRAM(s) Oral daily  atorvastatin 20 milliGRAM(s) Oral at bedtime  celecoxib 200 milliGRAM(s) Oral daily  enoxaparin Injectable 40 milliGRAM(s) SubCutaneous daily  ergocalciferol 55926 Unit(s) Oral <User Schedule>  fentaNYL   Patch  12 MICROgram(s)/Hr 1 Patch Transdermal every 72 hours  ferrous    sulfate 325 milliGRAM(s) Oral daily  levothyroxine 25 MICROGram(s) Oral daily  melatonin 3 milliGRAM(s) Oral at bedtime  polyethylene glycol 3350 17 Gram(s) Oral two times a day  risperiDONE   Tablet 0.5 milliGRAM(s) Oral at bedtime  risperiDONE  Disintegrating Tablet 0.5 milliGRAM(s) Oral daily  risperiDONE  Disintegrating Tablet 0.25 milliGRAM(s) Oral once  senna 2 Tablet(s) Oral at bedtime  tamsulosin 0.4 milliGRAM(s) Oral at bedtime  tranexamic acid IVPB 1000 milliGRAM(s) IV Intermittent once    MEDICATIONS  (PRN):  bisacodyl Suppository 10 milliGRAM(s) Rectal daily PRN Constipation  mineral oil enema 133 milliLiter(s) Rectal once PRN persistent constipation  QUEtiapine 12.5 milliGRAM(s) Oral every 8 hours PRN restlessness    PHYSICAL EXAM:    Vital Signs Last 24 Hrs  T(C): 36.8 (10 Feb 2022 10:32), Max: 38.1 (09 Feb 2022 19:20)  T(F): 98.2 (10 Feb 2022 10:32), Max: 100.5 (09 Feb 2022 19:20)  HR: 85 (10 Feb 2022 10:32) (85 - 105)  BP: 110/62 (10 Feb 2022 10:32) (99/57 - 123/71)  BP(mean): --  RR: 18 (10 Feb 2022 10:32) (17 - 18)  SpO2: 92% (10 Feb 2022 10:32) (90% - 92%)    General: alert  oriented x _self___ sleep deprived due to prolonged Delirium                  agitated restless when awake cachexia  verbal      Karnofsky:  %20-30    HEENT: dry mouth     Lungs: comfortable    CV: normal     GI: normal  distended                 constipation  last BM: ?    : incontinent stover    MSK weakness  edema               bedbound/    Skin: _  no rash    LABS:                          9.0    9.93  )-----------( 310      ( 09 Feb 2022 05:26 )             27.9     02-09    140  |  106  |  26.5<H>  ----------------------------<  121<H>  4.3   |  23.0  |  0.67    Ca    8.5<L>      09 Feb 2022 05:26  Phos  3.4     02-09    I&O's Summary    09 Feb 2022 07:01  -  10 Feb 2022 07:00  --------------------------------------------------------  IN: 120 mL / OUT: 850 mL / NET: -730 mL    RADIOLOGY & ADDITIONAL STUDIES:    IMPRESSION:  Post traumatic/postoperative changes in the left hip status post ORIF of   intertrochanteric fracture with areas of intramuscular hematoma again   identified  Minimal bilateral pleural effusions        --- End of Report ---      CHUNG HAMLIN MD; Attending Radiologist  This document has been electronically signed. Feb  3 2022  7:43PM      ADVANCE DIRECTIVES/TREATMENT PREFERENCES:  DNR YES   Completed on:                     MOLST  YES    Completed on: by hospitalist 2/7  Living Will  NO   Completed on:

## 2022-02-11 LAB
ANION GAP SERPL CALC-SCNC: 12 MMOL/L — SIGNIFICANT CHANGE UP (ref 5–17)
BUN SERPL-MCNC: 36.9 MG/DL — HIGH (ref 8–20)
CALCIUM SERPL-MCNC: 8.2 MG/DL — LOW (ref 8.6–10.2)
CHLORIDE SERPL-SCNC: 107 MMOL/L — SIGNIFICANT CHANGE UP (ref 98–107)
CO2 SERPL-SCNC: 23 MMOL/L — SIGNIFICANT CHANGE UP (ref 22–29)
CREAT SERPL-MCNC: 0.76 MG/DL — SIGNIFICANT CHANGE UP (ref 0.5–1.3)
GLUCOSE SERPL-MCNC: 112 MG/DL — HIGH (ref 70–99)
HCT VFR BLD CALC: 25.5 % — LOW (ref 34.5–45)
HGB BLD-MCNC: 8.1 G/DL — LOW (ref 11.5–15.5)
MCHC RBC-ENTMCNC: 29.9 PG — SIGNIFICANT CHANGE UP (ref 27–34)
MCHC RBC-ENTMCNC: 31.8 GM/DL — LOW (ref 32–36)
MCV RBC AUTO: 94.1 FL — SIGNIFICANT CHANGE UP (ref 80–100)
PLATELET # BLD AUTO: 359 K/UL — SIGNIFICANT CHANGE UP (ref 150–400)
POTASSIUM SERPL-MCNC: 4.8 MMOL/L — SIGNIFICANT CHANGE UP (ref 3.5–5.3)
POTASSIUM SERPL-SCNC: 4.8 MMOL/L — SIGNIFICANT CHANGE UP (ref 3.5–5.3)
RBC # BLD: 2.71 M/UL — LOW (ref 3.8–5.2)
RBC # FLD: 15.4 % — HIGH (ref 10.3–14.5)
SODIUM SERPL-SCNC: 142 MMOL/L — SIGNIFICANT CHANGE UP (ref 135–145)
WBC # BLD: 10.4 K/UL — SIGNIFICANT CHANGE UP (ref 3.8–10.5)
WBC # FLD AUTO: 10.4 K/UL — SIGNIFICANT CHANGE UP (ref 3.8–10.5)

## 2022-02-11 PROCEDURE — 99232 SBSQ HOSP IP/OBS MODERATE 35: CPT

## 2022-02-11 PROCEDURE — 99231 SBSQ HOSP IP/OBS SF/LOW 25: CPT

## 2022-02-11 RX ORDER — OXYCODONE HYDROCHLORIDE 5 MG/1
5 TABLET ORAL ONCE
Refills: 0 | Status: DISCONTINUED | OUTPATIENT
Start: 2022-02-11 | End: 2022-02-11

## 2022-02-11 RX ADMIN — FENTANYL CITRATE 1 PATCH: 50 INJECTION INTRAVENOUS at 12:31

## 2022-02-11 RX ADMIN — Medication 1 DROP(S): at 23:10

## 2022-02-11 RX ADMIN — ATORVASTATIN CALCIUM 20 MILLIGRAM(S): 80 TABLET, FILM COATED ORAL at 21:14

## 2022-02-11 RX ADMIN — ENOXAPARIN SODIUM 40 MILLIGRAM(S): 100 INJECTION SUBCUTANEOUS at 12:25

## 2022-02-11 RX ADMIN — Medication 975 MILLIGRAM(S): at 06:45

## 2022-02-11 RX ADMIN — Medication 500 MILLIGRAM(S): at 05:55

## 2022-02-11 RX ADMIN — Medication 975 MILLIGRAM(S): at 13:15

## 2022-02-11 RX ADMIN — Medication 500 MILLIGRAM(S): at 17:31

## 2022-02-11 RX ADMIN — POLYETHYLENE GLYCOL 3350 17 GRAM(S): 17 POWDER, FOR SOLUTION ORAL at 05:55

## 2022-02-11 RX ADMIN — CELECOXIB 200 MILLIGRAM(S): 200 CAPSULE ORAL at 12:26

## 2022-02-11 RX ADMIN — FENTANYL CITRATE 1 PATCH: 50 INJECTION INTRAVENOUS at 07:30

## 2022-02-11 RX ADMIN — FENTANYL CITRATE 1 PATCH: 50 INJECTION INTRAVENOUS at 19:45

## 2022-02-11 RX ADMIN — FENTANYL CITRATE 1 PATCH: 50 INJECTION INTRAVENOUS at 13:15

## 2022-02-11 RX ADMIN — Medication 1 DROP(S): at 17:32

## 2022-02-11 RX ADMIN — POLYETHYLENE GLYCOL 3350 17 GRAM(S): 17 POWDER, FOR SOLUTION ORAL at 17:31

## 2022-02-11 RX ADMIN — RISPERIDONE 0.5 MILLIGRAM(S): 4 TABLET ORAL at 21:14

## 2022-02-11 RX ADMIN — SENNA PLUS 2 TABLET(S): 8.6 TABLET ORAL at 21:14

## 2022-02-11 RX ADMIN — Medication 1 DROP(S): at 12:28

## 2022-02-11 RX ADMIN — OXYCODONE HYDROCHLORIDE 5 MILLIGRAM(S): 5 TABLET ORAL at 21:31

## 2022-02-11 RX ADMIN — Medication 975 MILLIGRAM(S): at 22:00

## 2022-02-11 RX ADMIN — Medication 1 DROP(S): at 05:58

## 2022-02-11 RX ADMIN — CELECOXIB 200 MILLIGRAM(S): 200 CAPSULE ORAL at 13:15

## 2022-02-11 RX ADMIN — Medication 975 MILLIGRAM(S): at 12:27

## 2022-02-11 RX ADMIN — Medication 25 MICROGRAM(S): at 05:55

## 2022-02-11 RX ADMIN — Medication 975 MILLIGRAM(S): at 21:14

## 2022-02-11 RX ADMIN — Medication 81 MILLIGRAM(S): at 12:25

## 2022-02-11 RX ADMIN — Medication 975 MILLIGRAM(S): at 05:55

## 2022-02-11 RX ADMIN — TAMSULOSIN HYDROCHLORIDE 0.4 MILLIGRAM(S): 0.4 CAPSULE ORAL at 21:14

## 2022-02-11 RX ADMIN — RISPERIDONE 0.5 MILLIGRAM(S): 4 TABLET ORAL at 12:27

## 2022-02-11 RX ADMIN — Medication 325 MILLIGRAM(S): at 12:26

## 2022-02-11 RX ADMIN — Medication 3 MILLIGRAM(S): at 21:14

## 2022-02-11 RX ADMIN — OXYCODONE HYDROCHLORIDE 5 MILLIGRAM(S): 5 TABLET ORAL at 22:30

## 2022-02-11 NOTE — PROGRESS NOTE ADULT - SUBJECTIVE AND OBJECTIVE BOX
Patient: DANISH MICHAELS [id number] 89y Female                            Hospitalist Attending Note    Seen with aide at bedside.  Agitated at times.  no fevers.  Denies complaints.     ____________________PHYSICAL EXAM:  GENERAL:  NAD Alert and Oriented x 1 to person confused.   HEENT: NCAT  CARDIOVASCULAR:  S1, S2  LUNGS: CTAB  ABDOMEN:  soft, (-) tenderness, (-) distension, (+) bowel sounds, (-) guarding, (-) rebound (-) rigidity  EXTREMITIES:  no cyanosis / clubbing / edema.  L hip dressing in pladce.   NEURO: strength symmetric.   ____________________    VITALS:  Vital Signs Last 24 Hrs  T(C): 36.7 (11 Feb 2022 10:32), Max: 37 (10 Feb 2022 21:44)  T(F): 98 (11 Feb 2022 10:32), Max: 98.6 (10 Feb 2022 21:44)  HR: 94 (11 Feb 2022 10:32) (79 - 94)  BP: 116/64 (11 Feb 2022 10:32) (103/53 - 116/64)  BP(mean): --  RR: 18 (11 Feb 2022 10:32) (18 - 18)  SpO2: 92% (11 Feb 2022 10:32) (92% - 93%) Daily     Daily   CAPILLARY BLOOD GLUCOSE        I&O's Summary    10 Feb 2022 07:01  -  11 Feb 2022 07:00  --------------------------------------------------------  IN: 0 mL / OUT: 601 mL / NET: -601 mL        LABS:                        8.1    10.40 )-----------( 359      ( 11 Feb 2022 04:27 )             25.5     02-11    142  |  107  |  36.9<H>  ----------------------------<  112<H>  4.8   |  23.0  |  0.76    Ca    8.2<L>      11 Feb 2022 04:27                    MEDICATIONS:  acetaminophen     Tablet .. 975 milliGRAM(s) Oral every 8 hours  artificial  tears Solution 1 Drop(s) Right EYE every 6 hours  ascorbic acid 500 milliGRAM(s) Oral two times a day  aspirin  chewable 81 milliGRAM(s) Oral daily  atorvastatin 20 milliGRAM(s) Oral at bedtime  bisacodyl Suppository 10 milliGRAM(s) Rectal daily PRN  celecoxib 200 milliGRAM(s) Oral daily  enoxaparin Injectable 40 milliGRAM(s) SubCutaneous daily  ergocalciferol 05287 Unit(s) Oral <User Schedule>  fentaNYL   Patch  12 MICROgram(s)/Hr 1 Patch Transdermal every 72 hours  ferrous    sulfate 325 milliGRAM(s) Oral daily  levothyroxine 25 MICROGram(s) Oral daily  melatonin 3 milliGRAM(s) Oral at bedtime  mineral oil enema 133 milliLiter(s) Rectal once PRN  polyethylene glycol 3350 17 Gram(s) Oral two times a day  QUEtiapine 12.5 milliGRAM(s) Oral every 8 hours PRN  risperiDONE   Tablet 0.5 milliGRAM(s) Oral at bedtime  risperiDONE  Disintegrating Tablet 0.5 milliGRAM(s) Oral daily  senna 2 Tablet(s) Oral at bedtime  tamsulosin 0.4 milliGRAM(s) Oral at bedtime  tranexamic acid IVPB 1000 milliGRAM(s) IV Intermittent once

## 2022-02-11 NOTE — PROGRESS NOTE ADULT - NSPROGADDITIONALINFOA_GEN_ALL_CORE
Total Time Spent_15___ minutes  This includes chart review, patient assessment, discussion and collaboration with interdisciplinary team members, ACP planning    COUNSELING:  Face to face meeting to discuss Advanced Care Planning - Time Spent ______Minutes.      Thank you for the opportunity to assist with the care of this patient.   [facility] Palliative Medicine Consult Service [phone].
Total Time Spent_  60___ minutes  This includes chart review, patient assessment, discussion and collaboration with interdisciplinary team members, ACP planning    COUNSELING:  Face to face meeting to discuss Advanced Care Planning - Time Spent ______Minutes.      Thank you for the opportunity to assist with the care of this patient.   [facility] Palliative Medicine Consult Service [phone].
Total Time Spent__35__ minutes  This includes chart review, patient assessment, discussion and collaboration with interdisciplinary team members, ACP planning    COUNSELING:  Face to face meeting to discuss Advanced Care Planning - Time Spent _30_____Minutes.      Thank you for the opportunity to assist with the care of this patient.   [facility] Palliative Medicine Consult Service [phone].

## 2022-02-11 NOTE — PROGRESS NOTE ADULT - SUBJECTIVE AND OBJECTIVE BOX
OVERNIGHT EVENTS:  F/U Note  No new events overnight  Patient much calmer, verbally-able to make needs known  Assisted to commode (2) constipated  Able to increase her activity, will discontinue 1:1 and prepare to discharge to [facility]  Pain managed well. No fever or chills    Present Symptoms:     Dyspnea: none  Nausea/Vomiting:  No  Anxiety:  Yes less  Depression:  No  Fatigue: Yes   Loss of appetite:  No  Constipation: yes using commode    Pain: Assume Pain is present L hip repaired  assisted to stand and pivot to commode            Character-            Duration-            Effect-            Factors-            Frequency-            Location-            Severity-moderate    Review of Systems: Reviewed                   Unable to obtain due to poor mentation       MEDICATIONS  (STANDING):  acetaminophen     Tablet .. 975 milliGRAM(s) Oral every 8 hours  artificial  tears Solution 1 Drop(s) Right EYE every 6 hours  ascorbic acid 500 milliGRAM(s) Oral two times a day  aspirin  chewable 81 milliGRAM(s) Oral daily  atorvastatin 20 milliGRAM(s) Oral at bedtime  celecoxib 200 milliGRAM(s) Oral daily  enoxaparin Injectable 40 milliGRAM(s) SubCutaneous daily  ergocalciferol 82219 Unit(s) Oral <User Schedule>  fentaNYL   Patch  12 MICROgram(s)/Hr 1 Patch Transdermal every 72 hours  ferrous    sulfate 325 milliGRAM(s) Oral daily  levothyroxine 25 MICROGram(s) Oral daily  melatonin 3 milliGRAM(s) Oral at bedtime  polyethylene glycol 3350 17 Gram(s) Oral two times a day  risperiDONE   Tablet 0.5 milliGRAM(s) Oral at bedtime  risperiDONE  Disintegrating Tablet 0.5 milliGRAM(s) Oral daily  senna 2 Tablet(s) Oral at bedtime  tamsulosin 0.4 milliGRAM(s) Oral at bedtime  tranexamic acid IVPB 1000 milliGRAM(s) IV Intermittent once    MEDICATIONS  (PRN):  bisacodyl Suppository 10 milliGRAM(s) Rectal daily PRN Constipation  mineral oil enema 133 milliLiter(s) Rectal once PRN persistent constipation  QUEtiapine 12.5 milliGRAM(s) Oral every 8 hours PRN restlessness    PHYSICAL EXAM:  Vital Signs Last 24 Hrs  T(C): 36.7 (11 Feb 2022 10:32), Max: 37 (10 Feb 2022 21:44)  T(F): 98 (11 Feb 2022 10:32), Max: 98.6 (10 Feb 2022 21:44)  HR: 94 (11 Feb 2022 10:32) (79 - 94)  BP: 116/64 (11 Feb 2022 10:32) (103/53 - 116/64)  BP(mean): --  RR: 18 (11 Feb 2022 10:32) (18 - 18)  SpO2: 92% (11 Feb 2022 10:32) (92% - 93%)    General: alert  oriented x __self__                   cachexia      Karnofsky:  45%    HEENT: normal      Lungs: comfortable    CV: normal      GI: normal  distended                 constipation  last BM:     : normal  incontinent      MSK:   weakness             OOB/stand pivot to commode  bedbound/wheelchair bound    Skin:   no rash    LABS:                          8.1    10.40 )-----------( 359      ( 11 Feb 2022 04:27 )             25.5     02-11    142  |  107  |  36.9<H>  ----------------------------<  112<H>  4.8   |  23.0  |  0.76    Ca    8.2<L>      11 Feb 2022 04:27    I&O's Summary    10 Feb 2022 07:01  -  11 Feb 2022 07:00  --------------------------------------------------------  IN: 0 mL / OUT: 601 mL / NET: -601 mL    RADIOLOGY & ADDITIONAL STUDIES:    ADVANCE DIRECTIVES/TREATMENT PREFERENCES:  DNR YES   Completed on:                     MOLST  YES    Completed on: 2/10/22  Living Will   NO   Completed on:

## 2022-02-11 NOTE — PROGRESS NOTE ADULT - ASSESSMENT
89 yr old female with dementia, hypothyroidism, TIA presented after a fall, imaging revealed an acute left intertrochanteric fracture, labs with WBC 18 on admission. She is s/p IM nail of left hip. Post operative course was complicated by anemia, Hb 7.4. PRBC transfusion was given. Also developed urinary retention requiring Stover placement. She developed agitation and pulled out Stover, required constant observation. Her Hb did not improve, was 7.4 on 2/3, hence additional PRBC ordered and CT abdomen/pelvis with hip hematoma.  Hgb has been stable.  S/p IMN on 2/1.      1. Acute comminuted intertrochanteric fracture due to fall   s/p IM Nails on feb 1   for [facility] however on 1 :1 due to confusion , pulling on stover and lines    palliative care team follow up appreciated ; pain meds adjusted.  Appears comfortable.   cont pt/ot daily as able.  Voiding trial today.     2.  Dementia  with delirium  with intermittent  episode of agitation , impulsive behaviours   pulling on lines and stover   will continue 1 :1   cont risperidone , seroquel prn   pain meds.      3- Urinary retention stover in place.  continue Flomax.  voiding trial.     4- Vit d deficiency    started ergocalciferol 94461 unit weekly     5- Constipation - bowel regimen.  KUB ordered cont miralxa senna  will give lactulose if still no BM by evening add enema     6-hx TIA:   On aspirin and statin    7-Hypothyroidism: Continue Synthroid    8--- Severe protein manjeet mlanutrition   cont diet and ensure   MVI with minerals     9- Anemia post op   iron studies reviewed   cont  ferrous sulfate vit c daily   Hb stable     DVT ppx - on heparin     Code/social - MOLST completed DNR   niece is the HCP Navya, cell: [phone]

## 2022-02-11 NOTE — PROGRESS NOTE ADULT - REASON FOR ADMISSION
Fall L Hip Fx  S/P L intertrochanteric hip Fx repair IM Nailing
S/P Fall Repair of Left Intertrochanteric  Fx with IM Nailing
S/P Fall Repair of L Intertrochanteric Fx

## 2022-02-11 NOTE — PROGRESS NOTE ADULT - ASSESSMENT
Hyperactive Delirium  Underlying Advanced dementia,   1:1 with intermittent agitation and frequent reorientation  Multifactorial etiologies including dementia, hospitalization, pain related  Risperidone 0.25mg BID  increased  AM dose  now 0.5mg 2x/d PRN quetiapine   No prn antipsychotics necessary    Acute Pain   Fentanyl patch 12mcg tolerating, and functional status improving   celecoxib (both started on 2/8)  APAP 975mg ATC    Constipation  Patient has been getting more uncomfortable, she is asking to go to [facility]  Constipation may also be a factor in increasing confusion and restlessnesss  Dulcolax suppository follow with Fleet oil enema if needed  OOB to commode this afternoon    S/P Fall with Lt Hip Fracture  s/p IM nailing 2/1 by ortho  -Pain control, PT/OT    Advance Care Planning     DNR with trial of NIV, MOLST completed by hospitalist 2/7-revised on 2/10  Plan: [facility] once mental status improves enough to DC 1:1 supv  Will benefit from hospice services vs home care/palliative based program after discharge from [facility].  Prognosis guarded and high risk of rehospitalization given comorbidities, progressive debility, frailty.                       Hyperactive Delirium resolving  Underlying Advanced dementia,   1:1 with intermittent agitation and frequent reorientation-will be discontinued today  Bed alarm in place room across from Nurses station-frequent rounding  Multifactorial etiologies including dementia, hospitalization, pain related  Risperidone Increased  yesterday  now 0.5mg 2x/d   PRN quetiapine No prn antipsychotics necessary      Acute Pain   Fentanyl patch 12mcg tolerating, and functional status improving   Celecoxib (both started on 2/8)  APAP 975mg ATC    Constipation  Patient has been getting more uncomfortable, she is asking to go to [facility]  Constipation may also be a factor in increasing confusion and restlessness  Dulcolax suppository follow with Fleet oil enema if needed  OOB to commode this afternoon    S/P Fall with Lt Hip Fracture  s/p IM nailing 2/1 by ortho  -Pain control, PT/OT    Advance Care Planning     DNR with trial of NIV, MOLST completed by hospitalist 2/7-revised on 2/10  Plan: [facility] once mental status improves enough to DC 1:1 supv  Will benefit from hospice services vs home care/palliative based program after discharge from [facility].  Prognosis guarded and high risk of rehospitalization given comorbidities, progressive debility, frailty.

## 2022-02-12 LAB
ANION GAP SERPL CALC-SCNC: 12 MMOL/L — SIGNIFICANT CHANGE UP (ref 5–17)
BUN SERPL-MCNC: 36.3 MG/DL — HIGH (ref 8–20)
CALCIUM SERPL-MCNC: 8.2 MG/DL — LOW (ref 8.6–10.2)
CHLORIDE SERPL-SCNC: 105 MMOL/L — SIGNIFICANT CHANGE UP (ref 98–107)
CO2 SERPL-SCNC: 22 MMOL/L — SIGNIFICANT CHANGE UP (ref 22–29)
CREAT SERPL-MCNC: 0.73 MG/DL — SIGNIFICANT CHANGE UP (ref 0.5–1.3)
GLUCOSE SERPL-MCNC: 104 MG/DL — HIGH (ref 70–99)
HCT VFR BLD CALC: 25.1 % — LOW (ref 34.5–45)
HGB BLD-MCNC: 7.8 G/DL — LOW (ref 11.5–15.5)
MCHC RBC-ENTMCNC: 29.1 PG — SIGNIFICANT CHANGE UP (ref 27–34)
MCHC RBC-ENTMCNC: 31.1 GM/DL — LOW (ref 32–36)
MCV RBC AUTO: 93.7 FL — SIGNIFICANT CHANGE UP (ref 80–100)
PLATELET # BLD AUTO: 389 K/UL — SIGNIFICANT CHANGE UP (ref 150–400)
POTASSIUM SERPL-MCNC: 4.5 MMOL/L — SIGNIFICANT CHANGE UP (ref 3.5–5.3)
POTASSIUM SERPL-SCNC: 4.5 MMOL/L — SIGNIFICANT CHANGE UP (ref 3.5–5.3)
RBC # BLD: 2.68 M/UL — LOW (ref 3.8–5.2)
RBC # FLD: 15.3 % — HIGH (ref 10.3–14.5)
SODIUM SERPL-SCNC: 139 MMOL/L — SIGNIFICANT CHANGE UP (ref 135–145)
WBC # BLD: 8.01 K/UL — SIGNIFICANT CHANGE UP (ref 3.8–10.5)
WBC # FLD AUTO: 8.01 K/UL — SIGNIFICANT CHANGE UP (ref 3.8–10.5)

## 2022-02-12 PROCEDURE — 99232 SBSQ HOSP IP/OBS MODERATE 35: CPT

## 2022-02-12 RX ADMIN — POLYETHYLENE GLYCOL 3350 17 GRAM(S): 17 POWDER, FOR SOLUTION ORAL at 06:02

## 2022-02-12 RX ADMIN — RISPERIDONE 0.5 MILLIGRAM(S): 4 TABLET ORAL at 12:16

## 2022-02-12 RX ADMIN — ATORVASTATIN CALCIUM 20 MILLIGRAM(S): 80 TABLET, FILM COATED ORAL at 21:23

## 2022-02-12 RX ADMIN — FENTANYL CITRATE 1 PATCH: 50 INJECTION INTRAVENOUS at 19:53

## 2022-02-12 RX ADMIN — Medication 500 MILLIGRAM(S): at 06:03

## 2022-02-12 RX ADMIN — Medication 1 DROP(S): at 23:04

## 2022-02-12 RX ADMIN — CELECOXIB 200 MILLIGRAM(S): 200 CAPSULE ORAL at 12:16

## 2022-02-12 RX ADMIN — Medication 975 MILLIGRAM(S): at 22:00

## 2022-02-12 RX ADMIN — Medication 975 MILLIGRAM(S): at 12:16

## 2022-02-12 RX ADMIN — Medication 1 DROP(S): at 06:02

## 2022-02-12 RX ADMIN — Medication 325 MILLIGRAM(S): at 12:15

## 2022-02-12 RX ADMIN — Medication 1 DROP(S): at 12:17

## 2022-02-12 RX ADMIN — Medication 25 MICROGRAM(S): at 06:03

## 2022-02-12 RX ADMIN — Medication 3 MILLIGRAM(S): at 21:23

## 2022-02-12 RX ADMIN — FENTANYL CITRATE 1 PATCH: 50 INJECTION INTRAVENOUS at 07:45

## 2022-02-12 RX ADMIN — Medication 975 MILLIGRAM(S): at 21:24

## 2022-02-12 RX ADMIN — Medication 975 MILLIGRAM(S): at 06:03

## 2022-02-12 RX ADMIN — Medication 975 MILLIGRAM(S): at 13:00

## 2022-02-12 RX ADMIN — Medication 1 DROP(S): at 17:22

## 2022-02-12 RX ADMIN — CELECOXIB 200 MILLIGRAM(S): 200 CAPSULE ORAL at 13:00

## 2022-02-12 RX ADMIN — POLYETHYLENE GLYCOL 3350 17 GRAM(S): 17 POWDER, FOR SOLUTION ORAL at 17:22

## 2022-02-12 RX ADMIN — RISPERIDONE 0.5 MILLIGRAM(S): 4 TABLET ORAL at 21:25

## 2022-02-12 RX ADMIN — ENOXAPARIN SODIUM 40 MILLIGRAM(S): 100 INJECTION SUBCUTANEOUS at 12:16

## 2022-02-12 RX ADMIN — Medication 81 MILLIGRAM(S): at 12:16

## 2022-02-12 RX ADMIN — SENNA PLUS 2 TABLET(S): 8.6 TABLET ORAL at 21:23

## 2022-02-12 RX ADMIN — TAMSULOSIN HYDROCHLORIDE 0.4 MILLIGRAM(S): 0.4 CAPSULE ORAL at 21:23

## 2022-02-12 RX ADMIN — Medication 500 MILLIGRAM(S): at 17:23

## 2022-02-12 RX ADMIN — Medication 975 MILLIGRAM(S): at 07:45

## 2022-02-12 NOTE — PROGRESS NOTE ADULT - SUBJECTIVE AND OBJECTIVE BOX
Patient is a 89y old  Female who presents with a chief complaint of S/P Fall Repair of L Intertrochanteric Fx (11 Feb 2022 12:27)    Pt is calm and confused, denies any pain.  REVIEW OF SYSTEMS: Ams- unable    MEDICATIONS  (STANDING):  acetaminophen     Tablet .. 975 milliGRAM(s) Oral every 8 hours  artificial  tears Solution 1 Drop(s) Right EYE every 6 hours  ascorbic acid 500 milliGRAM(s) Oral two times a day  aspirin  chewable 81 milliGRAM(s) Oral daily  atorvastatin 20 milliGRAM(s) Oral at bedtime  celecoxib 200 milliGRAM(s) Oral daily  enoxaparin Injectable 40 milliGRAM(s) SubCutaneous daily  ergocalciferol 93274 Unit(s) Oral <User Schedule>  fentaNYL   Patch  12 MICROgram(s)/Hr 1 Patch Transdermal every 72 hours  ferrous    sulfate 325 milliGRAM(s) Oral daily  levothyroxine 25 MICROGram(s) Oral daily  melatonin 3 milliGRAM(s) Oral at bedtime  polyethylene glycol 3350 17 Gram(s) Oral two times a day  risperiDONE   Tablet 0.5 milliGRAM(s) Oral at bedtime  risperiDONE  Disintegrating Tablet 0.5 milliGRAM(s) Oral daily  senna 2 Tablet(s) Oral at bedtime  tamsulosin 0.4 milliGRAM(s) Oral at bedtime  tranexamic acid IVPB 1000 milliGRAM(s) IV Intermittent once    MEDICATIONS  (PRN):  bisacodyl Suppository 10 milliGRAM(s) Rectal daily PRN Constipation  mineral oil enema 133 milliLiter(s) Rectal once PRN persistent constipation  QUEtiapine 12.5 milliGRAM(s) Oral every 8 hours PRN restlessness      CAPILLARY BLOOD GLUCOSE        I&O's Summary    11 Feb 2022 07:01  -  12 Feb 2022 07:00  --------------------------------------------------------  IN: 370 mL / OUT: 530 mL / NET: -160 mL        PHYSICAL EXAM:  Vital Signs Last 24 Hrs  T(C): 36.3 (12 Feb 2022 04:06), Max: 36.7 (11 Feb 2022 10:32)  T(F): 97.4 (12 Feb 2022 04:06), Max: 98 (11 Feb 2022 10:32)  HR: 83 (12 Feb 2022 04:06) (83 - 102)  BP: 117/62 (12 Feb 2022 04:06) (112/64 - 166/75)  BP(mean): --  RR: 17 (12 Feb 2022 04:06) (17 - 18)  SpO2: 90% (12 Feb 2022 04:06) (90% - 92%)    CONSTITUTIONAL: NAD,  EYES: PERRLA; conjunctiva and sclera clear  ENMT: Moist oral mucosa,   RESPIRATORY: Normal respiratory effort; lungs are clear to auscultation bilaterally  CARDIOVASCULAR: Regular rate and rhythm, normal S1 and S2, no murmur   EXTS: No lower extremity edema; Peripheral pulses are 2+ bilaterally  ABDOMEN: Nontender to palpation, normoactive bowel sounds, no rebound/guarding;   MUSCLOSKELETAL:   no  cyanosis of digits; no joint swelling or tenderness to palpation  PSYCH:calm,confused  NEUROLOGY: A+O to person only , not to place, and time; no gross sensory/motor deficits;       LABS:                        7.8    8.01  )-----------( 389      ( 12 Feb 2022 04:24 )             25.1     02-12    139  |  105  |  36.3<H>  ----------------------------<  104<H>  4.5   |  22.0  |  0.73    Ca    8.2<L>      12 Feb 2022 04:24                  RADIOLOGY & ADDITIONAL TESTS:  Results Reviewed:

## 2022-02-12 NOTE — PROGRESS NOTE ADULT - ASSESSMENT
89 yr old female with dementia, hypothyroidism, TIA presented after a fall, imaging revealed an acute left intertrochanteric fracture, labs with WBC 18 on admission. She is s/p IM nail of left hip. Post operative course was complicated by anemia, Hb 7.4. PRBC transfusion was given. Also developed urinary retention requiring Stover placement. She developed agitation and pulled out Stover, required constant observation. Her Hb did not improve, was 7.4 on 2/3, hence additional PRBC ordered and CT abdomen/pelvis with hip hematoma.  Hgb has been stable.  S/p IMN on 2/1.      Acute lt comminuted intertrochanteric fracture due to fall s/p IM Nails on feb 1   -pain control  - dvt ppx  -PT eval   -dispo: [facility] however on 1 :1 due to confusion , pulling on stover and lines   - palliative care team following .   - cont pt/ot daily as able.      Dementia  with delirium  - with intermittent  episode of agitation , impulsive behaviour   - continue 1 :1   - cont risperidone , seroquel prn   pain meds.       Urinary retention stover in place.    -continue Flomax.      Vit d deficiency    -started ergocalciferol 69441 unit weekly     Constipation - bowel regimen.  KUB ordered cont miralxa senna  will give lactulose if still no BM by evening add enema     hx TIA:   On aspirin and statin    Hypothyroidism: Continue Synthroid     Severe protein manjeet mlanutrition   cont diet and ensure   MVI with minerals      Anemia post op   iron studies reviewed   cont  ferrous sulfate vit c daily   Hb 7.8  f/u am    DVT ppx - on heparin   disposition: [facility] when able to dc 1:1  sw for safe disposition  beck rn  Code/social - MOLST completed DNR   niece is the HCP Navya, cell: [phone]

## 2022-02-13 LAB
ANION GAP SERPL CALC-SCNC: 9 MMOL/L — SIGNIFICANT CHANGE UP (ref 5–17)
BUN SERPL-MCNC: 32.5 MG/DL — HIGH (ref 8–20)
CALCIUM SERPL-MCNC: 8.4 MG/DL — LOW (ref 8.6–10.2)
CHLORIDE SERPL-SCNC: 108 MMOL/L — HIGH (ref 98–107)
CO2 SERPL-SCNC: 23 MMOL/L — SIGNIFICANT CHANGE UP (ref 22–29)
CREAT SERPL-MCNC: 0.76 MG/DL — SIGNIFICANT CHANGE UP (ref 0.5–1.3)
GLUCOSE SERPL-MCNC: 110 MG/DL — HIGH (ref 70–99)
HCT VFR BLD CALC: 24.6 % — LOW (ref 34.5–45)
HGB BLD-MCNC: 7.7 G/DL — LOW (ref 11.5–15.5)
MCHC RBC-ENTMCNC: 29.4 PG — SIGNIFICANT CHANGE UP (ref 27–34)
MCHC RBC-ENTMCNC: 31.3 GM/DL — LOW (ref 32–36)
MCV RBC AUTO: 93.9 FL — SIGNIFICANT CHANGE UP (ref 80–100)
PLATELET # BLD AUTO: 405 K/UL — HIGH (ref 150–400)
POTASSIUM SERPL-MCNC: 4.7 MMOL/L — SIGNIFICANT CHANGE UP (ref 3.5–5.3)
POTASSIUM SERPL-SCNC: 4.7 MMOL/L — SIGNIFICANT CHANGE UP (ref 3.5–5.3)
RBC # BLD: 2.62 M/UL — LOW (ref 3.8–5.2)
RBC # FLD: 15.4 % — HIGH (ref 10.3–14.5)
SODIUM SERPL-SCNC: 140 MMOL/L — SIGNIFICANT CHANGE UP (ref 135–145)
WBC # BLD: 7.77 K/UL — SIGNIFICANT CHANGE UP (ref 3.8–10.5)
WBC # FLD AUTO: 7.77 K/UL — SIGNIFICANT CHANGE UP (ref 3.8–10.5)

## 2022-02-13 PROCEDURE — 99232 SBSQ HOSP IP/OBS MODERATE 35: CPT

## 2022-02-13 RX ADMIN — QUETIAPINE FUMARATE 12.5 MILLIGRAM(S): 200 TABLET, FILM COATED ORAL at 03:29

## 2022-02-13 RX ADMIN — TAMSULOSIN HYDROCHLORIDE 0.4 MILLIGRAM(S): 0.4 CAPSULE ORAL at 22:28

## 2022-02-13 RX ADMIN — FENTANYL CITRATE 1 PATCH: 50 INJECTION INTRAVENOUS at 11:18

## 2022-02-13 RX ADMIN — CELECOXIB 200 MILLIGRAM(S): 200 CAPSULE ORAL at 12:30

## 2022-02-13 RX ADMIN — FENTANYL CITRATE 1 PATCH: 50 INJECTION INTRAVENOUS at 19:58

## 2022-02-13 RX ADMIN — Medication 975 MILLIGRAM(S): at 05:37

## 2022-02-13 RX ADMIN — Medication 500 MILLIGRAM(S): at 17:36

## 2022-02-13 RX ADMIN — RISPERIDONE 0.5 MILLIGRAM(S): 4 TABLET ORAL at 22:29

## 2022-02-13 RX ADMIN — Medication 1 DROP(S): at 23:10

## 2022-02-13 RX ADMIN — Medication 1 DROP(S): at 17:36

## 2022-02-13 RX ADMIN — ENOXAPARIN SODIUM 40 MILLIGRAM(S): 100 INJECTION SUBCUTANEOUS at 11:28

## 2022-02-13 RX ADMIN — ATORVASTATIN CALCIUM 20 MILLIGRAM(S): 80 TABLET, FILM COATED ORAL at 22:29

## 2022-02-13 RX ADMIN — Medication 975 MILLIGRAM(S): at 17:00

## 2022-02-13 RX ADMIN — Medication 81 MILLIGRAM(S): at 11:28

## 2022-02-13 RX ADMIN — Medication 975 MILLIGRAM(S): at 16:00

## 2022-02-13 RX ADMIN — SENNA PLUS 2 TABLET(S): 8.6 TABLET ORAL at 22:29

## 2022-02-13 RX ADMIN — Medication 325 MILLIGRAM(S): at 11:29

## 2022-02-13 RX ADMIN — Medication 975 MILLIGRAM(S): at 23:15

## 2022-02-13 RX ADMIN — Medication 975 MILLIGRAM(S): at 22:28

## 2022-02-13 RX ADMIN — Medication 1 DROP(S): at 11:30

## 2022-02-13 RX ADMIN — Medication 25 MICROGRAM(S): at 05:06

## 2022-02-13 RX ADMIN — Medication 1 DROP(S): at 05:06

## 2022-02-13 RX ADMIN — RISPERIDONE 0.5 MILLIGRAM(S): 4 TABLET ORAL at 11:45

## 2022-02-13 RX ADMIN — Medication 500 MILLIGRAM(S): at 05:07

## 2022-02-13 RX ADMIN — CELECOXIB 200 MILLIGRAM(S): 200 CAPSULE ORAL at 11:28

## 2022-02-13 RX ADMIN — Medication 975 MILLIGRAM(S): at 05:07

## 2022-02-13 RX ADMIN — Medication 3 MILLIGRAM(S): at 22:29

## 2022-02-13 NOTE — PROGRESS NOTE ADULT - SUBJECTIVE AND OBJECTIVE BOX
Patient is a 89y old  Female who presents with a chief complaint of S/P Fall Repair of L Intertrochanteric Fx (11 Feb 2022 12:27)    PT IS CALM,EATING BREAKFAST W/ASSIST.Denies any pain,son,cp  REVIEW OF SYSTEMS: AMS- not reliable    MEDICATIONS  (STANDING):  acetaminophen     Tablet .. 975 milliGRAM(s) Oral every 8 hours  artificial  tears Solution 1 Drop(s) Right EYE every 6 hours  ascorbic acid 500 milliGRAM(s) Oral two times a day  aspirin  chewable 81 milliGRAM(s) Oral daily  atorvastatin 20 milliGRAM(s) Oral at bedtime  celecoxib 200 milliGRAM(s) Oral daily  enoxaparin Injectable 40 milliGRAM(s) SubCutaneous daily  ergocalciferol 99719 Unit(s) Oral <User Schedule>  fentaNYL   Patch  12 MICROgram(s)/Hr 1 Patch Transdermal every 72 hours  ferrous    sulfate 325 milliGRAM(s) Oral daily  levothyroxine 25 MICROGram(s) Oral daily  melatonin 3 milliGRAM(s) Oral at bedtime  polyethylene glycol 3350 17 Gram(s) Oral two times a day  risperiDONE   Tablet 0.5 milliGRAM(s) Oral at bedtime  risperiDONE  Disintegrating Tablet 0.5 milliGRAM(s) Oral daily  senna 2 Tablet(s) Oral at bedtime  tamsulosin 0.4 milliGRAM(s) Oral at bedtime  tranexamic acid IVPB 1000 milliGRAM(s) IV Intermittent once    MEDICATIONS  (PRN):  bisacodyl Suppository 10 milliGRAM(s) Rectal daily PRN Constipation  mineral oil enema 133 milliLiter(s) Rectal once PRN persistent constipation  QUEtiapine 12.5 milliGRAM(s) Oral every 8 hours PRN restlessness      CAPILLARY BLOOD GLUCOSE        I&O's Summary    12 Feb 2022 07:01  -  13 Feb 2022 07:00  --------------------------------------------------------  IN: 520 mL / OUT: 1020 mL / NET: -500 mL        PHYSICAL EXAM:  Vital Signs Last 24 Hrs  T(C): 36.6 (13 Feb 2022 03:43), Max: 36.8 (12 Feb 2022 17:20)  T(F): 97.8 (13 Feb 2022 03:43), Max: 98.2 (12 Feb 2022 17:20)  HR: 89 (13 Feb 2022 03:43) (89 - 97)  BP: 129/63 (13 Feb 2022 03:43) (128/66 - 147/77)  BP(mean): --  RR: 18 (13 Feb 2022 03:43) (18 - 18)  SpO2: 92% (13 Feb 2022 03:43) (91% - 92%)    CONSTITUTIONAL: NAD,  EYES: PERRLA; conjunctiva and sclera clear  ENMT: Moist oral mucosa,   RESPIRATORY: Normal respiratory effort; lungs are clear to auscultation bilaterally  CARDIOVASCULAR: Regular rate and rhythm, normal S1 and S2, no murmur   EXTS: No lower extremity edema; Peripheral pulses are 2+ bilaterally  ABDOMEN: Nontender to palpation, normoactive bowel sounds, no rebound/guarding;   PSYCH: calm  NEUROLOGY: A+O to self , not to place, and time; +m/s      LABS:                        7.7    7.77  )-----------( 405      ( 13 Feb 2022 07:26 )             24.6     02-13    140  |  108<H>  |  32.5<H>  ----------------------------<  110<H>  4.7   |  23.0  |  0.76    Ca    8.4<L>      13 Feb 2022 07:26                  RADIOLOGY & ADDITIONAL TESTS:  Results Reviewed:

## 2022-02-13 NOTE — PROGRESS NOTE ADULT - ASSESSMENT
89 yr old female with dementia, hypothyroidism, TIA presented after a fall, imaging revealed an acute left intertrochanteric fracture, labs with WBC 18 on admission. She is s/p IM nail of left hip. Post operative course was complicated by anemia, Hb 7.4. PRBC transfusion was given. Also developed urinary retention requiring Stover placement. She developed agitation and pulled out Stover, required constant observation. Her Hb did not improve, was 7.4 on 2/3, hence additional PRBC ordered and CT abdomen/pelvis with hip hematoma.  Hgb has been stable.  S/p IMN on 2/1.      Acute lt comminuted intertrochanteric fracture due to fall s/p IM Nails on feb 1   -pain control  - dvt ppx  -PT eval   -dispo: [facility] however on 1 :1 due to confusion , pulling on stover and lines   - palliative care team following .   - cont pt/ot daily as able.      Dementia  with delirium  - with intermittent  episode of agitation , impulsive behaviour   - continue 1 :1   - cont risperidone , seroquel prn   pain meds.       Urinary retention stover in place.    -continue Flomax.      Vit d deficiency    -started ergocalciferol 97205 unit weekly     Constipation - bowel regimen.  KUB ordered cont miralxa senna  will give lactulose if still no BM by evening add enema     hx TIA:   On aspirin and statin    Hypothyroidism: Continue Synthroid     Severe protein manjeet mlanutrition   cont diet and ensure   MVI with minerals      Anemia post op   iron studies reviewed   cont  ferrous sulfate vit c daily   Hb 7.8  f/u am    DVT ppx - on heparin   disposition: [facility] when able to dc 1:1  sw for safe disposition  beck rn  Code/social - MOLST completed DNR   niece is the HCP Navya, cell: [phone]

## 2022-02-14 LAB
HCT VFR BLD CALC: 25.9 % — LOW (ref 34.5–45)
HGB BLD-MCNC: 8.1 G/DL — LOW (ref 11.5–15.5)
MCHC RBC-ENTMCNC: 29.6 PG — SIGNIFICANT CHANGE UP (ref 27–34)
MCHC RBC-ENTMCNC: 31.3 GM/DL — LOW (ref 32–36)
MCV RBC AUTO: 94.5 FL — SIGNIFICANT CHANGE UP (ref 80–100)
PLATELET # BLD AUTO: 467 K/UL — HIGH (ref 150–400)
RBC # BLD: 2.74 M/UL — LOW (ref 3.8–5.2)
RBC # FLD: 15.5 % — HIGH (ref 10.3–14.5)
SARS-COV-2 RNA SPEC QL NAA+PROBE: SIGNIFICANT CHANGE UP
WBC # BLD: 11.61 K/UL — HIGH (ref 3.8–10.5)
WBC # FLD AUTO: 11.61 K/UL — HIGH (ref 3.8–10.5)

## 2022-02-14 PROCEDURE — 99232 SBSQ HOSP IP/OBS MODERATE 35: CPT

## 2022-02-14 RX ORDER — ASPIRIN/CALCIUM CARB/MAGNESIUM 324 MG
1 TABLET ORAL
Qty: 0 | Refills: 0 | DISCHARGE

## 2022-02-14 RX ORDER — ASPIRIN/CALCIUM CARB/MAGNESIUM 324 MG
1 TABLET ORAL
Qty: 0 | Refills: 0 | DISCHARGE
Start: 2022-02-14

## 2022-02-14 RX ORDER — ERGOCALCIFEROL 1.25 MG/1
50000 CAPSULE ORAL
Qty: 0 | Refills: 0 | DISCHARGE
Start: 2022-02-14

## 2022-02-14 RX ORDER — QUETIAPINE FUMARATE 200 MG/1
12.5 TABLET, FILM COATED ORAL
Qty: 0 | Refills: 0 | DISCHARGE
Start: 2022-02-14

## 2022-02-14 RX ORDER — RISPERIDONE 4 MG/1
1 TABLET ORAL
Qty: 0 | Refills: 0 | DISCHARGE
Start: 2022-02-14

## 2022-02-14 RX ORDER — LANOLIN ALCOHOL/MO/W.PET/CERES
1 CREAM (GRAM) TOPICAL
Qty: 0 | Refills: 0 | DISCHARGE
Start: 2022-02-14

## 2022-02-14 RX ORDER — AMLODIPINE BESYLATE 2.5 MG/1
1 TABLET ORAL
Qty: 0 | Refills: 0 | DISCHARGE

## 2022-02-14 RX ORDER — FERROUS SULFATE 325(65) MG
1 TABLET ORAL
Qty: 0 | Refills: 0 | DISCHARGE
Start: 2022-02-14

## 2022-02-14 RX ORDER — LEVOTHYROXINE SODIUM 125 MCG
1 TABLET ORAL
Qty: 0 | Refills: 0 | DISCHARGE
Start: 2022-02-14

## 2022-02-14 RX ORDER — CELECOXIB 200 MG/1
1 CAPSULE ORAL
Qty: 0 | Refills: 0 | DISCHARGE
Start: 2022-02-14

## 2022-02-14 RX ORDER — LEVOTHYROXINE SODIUM 125 MCG
1 TABLET ORAL
Qty: 0 | Refills: 0 | DISCHARGE

## 2022-02-14 RX ORDER — ACETAMINOPHEN 500 MG
3 TABLET ORAL
Qty: 0 | Refills: 0 | DISCHARGE
Start: 2022-02-14

## 2022-02-14 RX ORDER — SENNA PLUS 8.6 MG/1
1 TABLET ORAL
Qty: 0 | Refills: 0 | DISCHARGE

## 2022-02-14 RX ORDER — TAMSULOSIN HYDROCHLORIDE 0.4 MG/1
1 CAPSULE ORAL
Qty: 0 | Refills: 0 | DISCHARGE
Start: 2022-02-14

## 2022-02-14 RX ORDER — ASCORBIC ACID 60 MG
1 TABLET,CHEWABLE ORAL
Qty: 0 | Refills: 0 | DISCHARGE
Start: 2022-02-14

## 2022-02-14 RX ORDER — SENNA PLUS 8.6 MG/1
2 TABLET ORAL
Qty: 0 | Refills: 0 | DISCHARGE
Start: 2022-02-14

## 2022-02-14 RX ORDER — ATORVASTATIN CALCIUM 80 MG/1
1 TABLET, FILM COATED ORAL
Qty: 0 | Refills: 0 | DISCHARGE
Start: 2022-02-14

## 2022-02-14 RX ORDER — MINERAL OIL
133 OIL (ML) MISCELLANEOUS
Qty: 0 | Refills: 0 | DISCHARGE
Start: 2022-02-14

## 2022-02-14 RX ORDER — DICLOFENAC SODIUM 30 MG/G
1 GEL TOPICAL
Qty: 0 | Refills: 0 | DISCHARGE

## 2022-02-14 RX ORDER — ENOXAPARIN SODIUM 100 MG/ML
40 INJECTION SUBCUTANEOUS
Qty: 0 | Refills: 0 | DISCHARGE
Start: 2022-02-14

## 2022-02-14 RX ORDER — POLYETHYLENE GLYCOL 3350 17 G/17G
17 POWDER, FOR SOLUTION ORAL
Qty: 0 | Refills: 0 | DISCHARGE
Start: 2022-02-14

## 2022-02-14 RX ORDER — ZINC OXIDE 200 MG/G
1 OINTMENT TOPICAL
Qty: 0 | Refills: 0 | DISCHARGE

## 2022-02-14 RX ORDER — ROSUVASTATIN CALCIUM 5 MG/1
1 TABLET ORAL
Qty: 0 | Refills: 0 | DISCHARGE

## 2022-02-14 RX ADMIN — RISPERIDONE 0.5 MILLIGRAM(S): 4 TABLET ORAL at 21:23

## 2022-02-14 RX ADMIN — Medication 975 MILLIGRAM(S): at 07:19

## 2022-02-14 RX ADMIN — Medication 1 DROP(S): at 17:01

## 2022-02-14 RX ADMIN — POLYETHYLENE GLYCOL 3350 17 GRAM(S): 17 POWDER, FOR SOLUTION ORAL at 17:00

## 2022-02-14 RX ADMIN — Medication 1 DROP(S): at 12:55

## 2022-02-14 RX ADMIN — Medication 975 MILLIGRAM(S): at 21:23

## 2022-02-14 RX ADMIN — Medication 500 MILLIGRAM(S): at 06:32

## 2022-02-14 RX ADMIN — Medication 975 MILLIGRAM(S): at 06:32

## 2022-02-14 RX ADMIN — ATORVASTATIN CALCIUM 20 MILLIGRAM(S): 80 TABLET, FILM COATED ORAL at 21:22

## 2022-02-14 RX ADMIN — FENTANYL CITRATE 1 PATCH: 50 INJECTION INTRAVENOUS at 07:33

## 2022-02-14 RX ADMIN — SENNA PLUS 2 TABLET(S): 8.6 TABLET ORAL at 21:22

## 2022-02-14 RX ADMIN — RISPERIDONE 0.5 MILLIGRAM(S): 4 TABLET ORAL at 12:54

## 2022-02-14 RX ADMIN — Medication 975 MILLIGRAM(S): at 13:45

## 2022-02-14 RX ADMIN — Medication 81 MILLIGRAM(S): at 12:54

## 2022-02-14 RX ADMIN — CELECOXIB 200 MILLIGRAM(S): 200 CAPSULE ORAL at 13:45

## 2022-02-14 RX ADMIN — Medication 3 MILLIGRAM(S): at 21:23

## 2022-02-14 RX ADMIN — Medication 975 MILLIGRAM(S): at 22:10

## 2022-02-14 RX ADMIN — Medication 1 DROP(S): at 06:31

## 2022-02-14 RX ADMIN — Medication 25 MICROGRAM(S): at 06:32

## 2022-02-14 RX ADMIN — Medication 500 MILLIGRAM(S): at 17:00

## 2022-02-14 RX ADMIN — QUETIAPINE FUMARATE 12.5 MILLIGRAM(S): 200 TABLET, FILM COATED ORAL at 02:10

## 2022-02-14 RX ADMIN — CELECOXIB 200 MILLIGRAM(S): 200 CAPSULE ORAL at 12:54

## 2022-02-14 RX ADMIN — ENOXAPARIN SODIUM 40 MILLIGRAM(S): 100 INJECTION SUBCUTANEOUS at 12:55

## 2022-02-14 RX ADMIN — Medication 325 MILLIGRAM(S): at 12:54

## 2022-02-14 RX ADMIN — Medication 975 MILLIGRAM(S): at 12:54

## 2022-02-14 RX ADMIN — TAMSULOSIN HYDROCHLORIDE 0.4 MILLIGRAM(S): 0.4 CAPSULE ORAL at 21:23

## 2022-02-14 RX ADMIN — FENTANYL CITRATE 1 PATCH: 50 INJECTION INTRAVENOUS at 13:45

## 2022-02-14 RX ADMIN — FENTANYL CITRATE 1 PATCH: 50 INJECTION INTRAVENOUS at 12:54

## 2022-02-14 NOTE — PROGRESS NOTE ADULT - ASSESSMENT
89 yr old female with dementia, hypothyroidism, TIA presented after a fall, imaging revealed an acute left intertrochanteric fracture, labs with WBC 18 on admission. She is s/p IM nail of left hip. Post operative course was complicated by anemia, Hb 7.4. PRBC transfusion was given. Also developed urinary retention requiring Stover placement. She developed agitation and pulled out Stover, required constant observation. Her Hb did not improve, was 7.4 on 2/3, hence additional PRBC ordered and CT abdomen/pelvis with hip hematoma.  Hgb has been stable.  S/p IMN on 2/1.      Acute lt comminuted intertrochanteric fracture due to fall s/p IM Nails on feb 1   - pain control  - dvt ppx  -PT eval   - dispo: [facility].  - palliative care team following .   - cont pt/ot daily as able.      Dementia  with delirium  - with intermittent  episode of agitation , impulsive behaviour   -calm for last 24 hr  - cont risperidone , seroquel prn   pain meds.       Urinary retention stover in place.    -continue Flomax.      Vit d deficiency    -started ergocalciferol 62446 unit weekly     Constipation - bowel regimen.  KUB ordered cont miralxa senna  will give lactulose if still no BM by evening add enema     hx TIA:   On aspirin and statin    Hypothyroidism: Continue Synthroid     Severe protein manjeet mlanutrition   cont diet and ensure   MVI with minerals      Anemia post op   iron studies reviewed   cont  ferrous sulfate vit c daily   Hb 7.8  f/u am    DVT ppx - on heparin   disposition: [facility] byrd for safe disposition  beck rn  Code/social - MOLST completed DNR   niece is the HCP ,  Navya, cell: [phone]

## 2022-02-14 NOTE — PROGRESS NOTE ADULT - SUBJECTIVE AND OBJECTIVE BOX
Patient is a 89y old  Female who presents with a chief complaint of S/P Fall Repair of L Intertrochanteric Fx (11 Feb 2022 12:27)    pt is calm am. eating breakfast. sitting on chair.  pleasant am  denies abd pain,cp,sob,vomiting  REVIEW OF SYSTEMS: denies any acute issues,  not reliable- ams    MEDICATIONS  (STANDING):  acetaminophen     Tablet .. 975 milliGRAM(s) Oral every 8 hours  artificial  tears Solution 1 Drop(s) Right EYE every 6 hours  ascorbic acid 500 milliGRAM(s) Oral two times a day  aspirin  chewable 81 milliGRAM(s) Oral daily  atorvastatin 20 milliGRAM(s) Oral at bedtime  celecoxib 200 milliGRAM(s) Oral daily  enoxaparin Injectable 40 milliGRAM(s) SubCutaneous daily  ergocalciferol 26347 Unit(s) Oral <User Schedule>  fentaNYL   Patch  12 MICROgram(s)/Hr 1 Patch Transdermal every 72 hours  ferrous    sulfate 325 milliGRAM(s) Oral daily  levothyroxine 25 MICROGram(s) Oral daily  melatonin 3 milliGRAM(s) Oral at bedtime  polyethylene glycol 3350 17 Gram(s) Oral two times a day  risperiDONE   Tablet 0.5 milliGRAM(s) Oral at bedtime  risperiDONE  Disintegrating Tablet 0.5 milliGRAM(s) Oral daily  senna 2 Tablet(s) Oral at bedtime  tamsulosin 0.4 milliGRAM(s) Oral at bedtime  tranexamic acid IVPB 1000 milliGRAM(s) IV Intermittent once    MEDICATIONS  (PRN):  bisacodyl Suppository 10 milliGRAM(s) Rectal daily PRN Constipation  mineral oil enema 133 milliLiter(s) Rectal once PRN persistent constipation  QUEtiapine 12.5 milliGRAM(s) Oral every 8 hours PRN restlessness      CAPILLARY BLOOD GLUCOSE        I&O's Summary    13 Feb 2022 07:01  -  14 Feb 2022 07:00  --------------------------------------------------------  IN: 0 mL / OUT: 550 mL / NET: -550 mL        PHYSICAL EXAM:  Vital Signs Last 24 Hrs  T(C): 36.4 (14 Feb 2022 08:46), Max: 37.3 (13 Feb 2022 11:30)  T(F): 97.6 (14 Feb 2022 08:46), Max: 99.1 (13 Feb 2022 11:30)  HR: 103 (14 Feb 2022 08:46) (81 - 103)  BP: 144/68 (14 Feb 2022 08:46) (101/61 - 144/68)  BP(mean): --  RR: 18 (14 Feb 2022 08:46) (17 - 18)  SpO2: 92% (14 Feb 2022 08:46) (91% - 94%)    CONSTITUTIONAL: NAD,  EYES: PERRLA; conjunctiva and sclera clear  ENMT: Moist oral mucosa, no soreness tongue/lips  RESPIRATORY: Normal respiratory effort; lungs are clear to auscultation bilaterally  CARDIOVASCULAR: Regular rate and rhythm, normal S1 and S2, no murmur   EXTS: No lower extremity edema; Peripheral pulses are 2+ bilaterally  ABDOMEN: Nontender to palpation, normoactive bowel sounds, no rebound/guarding;   MUSCLOSKELETAL:   no cyanosis of digits; no joint swelling or tenderness to palpation  PSYCH: calm am  NEUROLOGY: A+O to person, not to place, and not to time;  no gross sensory/motor deficits;       LABS:                        8.1    11.61 )-----------( 467      ( 14 Feb 2022 05:45 )             25.9     02-13    140  |  108<H>  |  32.5<H>  ----------------------------<  110<H>  4.7   |  23.0  |  0.76    Ca    8.4<L>      13 Feb 2022 07:26                  RADIOLOGY & ADDITIONAL TESTS:  Results Reviewed:

## 2022-02-15 LAB
HCT VFR BLD CALC: 26.9 % — LOW (ref 34.5–45)
HGB BLD-MCNC: 8.4 G/DL — LOW (ref 11.5–15.5)
MCHC RBC-ENTMCNC: 29.6 PG — SIGNIFICANT CHANGE UP (ref 27–34)
MCHC RBC-ENTMCNC: 31.2 GM/DL — LOW (ref 32–36)
MCV RBC AUTO: 94.7 FL — SIGNIFICANT CHANGE UP (ref 80–100)
PLATELET # BLD AUTO: 471 K/UL — HIGH (ref 150–400)
RBC # BLD: 2.84 M/UL — LOW (ref 3.8–5.2)
RBC # FLD: 15.6 % — HIGH (ref 10.3–14.5)
WBC # BLD: 9.95 K/UL — SIGNIFICANT CHANGE UP (ref 3.8–10.5)
WBC # FLD AUTO: 9.95 K/UL — SIGNIFICANT CHANGE UP (ref 3.8–10.5)

## 2022-02-15 PROCEDURE — 99232 SBSQ HOSP IP/OBS MODERATE 35: CPT

## 2022-02-15 RX ADMIN — Medication 1 DROP(S): at 17:22

## 2022-02-15 RX ADMIN — Medication 500 MILLIGRAM(S): at 06:41

## 2022-02-15 RX ADMIN — FENTANYL CITRATE 1 PATCH: 50 INJECTION INTRAVENOUS at 19:32

## 2022-02-15 RX ADMIN — Medication 975 MILLIGRAM(S): at 21:31

## 2022-02-15 RX ADMIN — FENTANYL CITRATE 1 PATCH: 50 INJECTION INTRAVENOUS at 00:10

## 2022-02-15 RX ADMIN — Medication 500 MILLIGRAM(S): at 17:22

## 2022-02-15 RX ADMIN — Medication 325 MILLIGRAM(S): at 12:53

## 2022-02-15 RX ADMIN — FENTANYL CITRATE 1 PATCH: 50 INJECTION INTRAVENOUS at 07:16

## 2022-02-15 RX ADMIN — Medication 81 MILLIGRAM(S): at 12:54

## 2022-02-15 RX ADMIN — TAMSULOSIN HYDROCHLORIDE 0.4 MILLIGRAM(S): 0.4 CAPSULE ORAL at 21:32

## 2022-02-15 RX ADMIN — Medication 3 MILLIGRAM(S): at 21:31

## 2022-02-15 RX ADMIN — ATORVASTATIN CALCIUM 20 MILLIGRAM(S): 80 TABLET, FILM COATED ORAL at 21:31

## 2022-02-15 RX ADMIN — Medication 975 MILLIGRAM(S): at 05:17

## 2022-02-15 RX ADMIN — ERGOCALCIFEROL 50000 UNIT(S): 1.25 CAPSULE ORAL at 17:21

## 2022-02-15 RX ADMIN — SENNA PLUS 2 TABLET(S): 8.6 TABLET ORAL at 21:32

## 2022-02-15 RX ADMIN — CELECOXIB 200 MILLIGRAM(S): 200 CAPSULE ORAL at 12:53

## 2022-02-15 RX ADMIN — POLYETHYLENE GLYCOL 3350 17 GRAM(S): 17 POWDER, FOR SOLUTION ORAL at 17:21

## 2022-02-15 RX ADMIN — Medication 1 DROP(S): at 12:54

## 2022-02-15 RX ADMIN — CELECOXIB 200 MILLIGRAM(S): 200 CAPSULE ORAL at 13:50

## 2022-02-15 RX ADMIN — Medication 975 MILLIGRAM(S): at 13:50

## 2022-02-15 RX ADMIN — Medication 975 MILLIGRAM(S): at 06:00

## 2022-02-15 RX ADMIN — RISPERIDONE 0.5 MILLIGRAM(S): 4 TABLET ORAL at 12:53

## 2022-02-15 RX ADMIN — Medication 25 MICROGRAM(S): at 05:17

## 2022-02-15 RX ADMIN — Medication 1 DROP(S): at 00:01

## 2022-02-15 RX ADMIN — ENOXAPARIN SODIUM 40 MILLIGRAM(S): 100 INJECTION SUBCUTANEOUS at 12:54

## 2022-02-15 RX ADMIN — POLYETHYLENE GLYCOL 3350 17 GRAM(S): 17 POWDER, FOR SOLUTION ORAL at 05:18

## 2022-02-15 RX ADMIN — Medication 1 DROP(S): at 23:08

## 2022-02-15 RX ADMIN — Medication 975 MILLIGRAM(S): at 22:20

## 2022-02-15 RX ADMIN — RISPERIDONE 0.5 MILLIGRAM(S): 4 TABLET ORAL at 21:31

## 2022-02-15 RX ADMIN — Medication 1 DROP(S): at 05:18

## 2022-02-15 RX ADMIN — Medication 975 MILLIGRAM(S): at 12:53

## 2022-02-15 NOTE — PROGRESS NOTE ADULT - ASSESSMENT
89 yr old female with dementia, hypothyroidism, TIA presented after a fall, imaging revealed an acute left intertrochanteric fracture, labs with WBC 18 on admission. She is s/p IM nail of left hip. Post operative course was complicated by anemia, Hb 7.4. PRBC transfusion was given. Also developed urinary retention requiring Stover placement. She developed agitation and pulled out Stover, required constant observation. Her Hb did not improve, was 7.4 on 2/3, hence additional PRBC ordered and CT abdomen/pelvis with hip hematoma.  Hgb has been stable.  S/p IMN on 2/1.      Acute lt comminuted intertrochanteric fracture due to fall s/p IM Nails on feb 1   - pain control  - dvt ppx  -PT eval   - dispo: [facility].  - palliative care team following .   - cont pt/ot daily as able.      Dementia  with delirium  - with intermittent  episode of agitation , impulsive behaviour   - cont risperidone , seroquel prn   pain meds.       Urinary retention stover in place.    -continue Flomax.      Vit d deficiency    -started ergocalciferol 29289 unit weekly     Constipation - bowel regimen.  KUB ordered cont miralxa senna  will give lactulose if still no BM by evening add enema     hx TIA:   On aspirin and statin    Hypothyroidism: Continue Synthroid     Severe protein manjeet mlanutrition   cont diet and ensure   MVI with minerals     Anemia post op   iron studies reviewed   cont  ferrous sulfate vit c daily   Hb 7.8  f/u am    DVT ppx - on heparin   disposition: [facility] for safe disposition, bed offered, carson in contact with Arabella solares rn  Code/social - MOLST completed DNR   Called arabella is the HCP Navya, cell: [phone] -unble to reach left msg

## 2022-02-15 NOTE — CHART NOTE - NSCHARTNOTEFT_GEN_A_CORE
Palliative NP Note. Chart reviewed, hospital course followed; Patient much calmer still confused and conversation nonsensical. Patient still having persistent delirium with underlying dementia, frequent rounding by staff essential. Palliative service will be signing off Ms Avila's  case at this time, Planning on Disposition to [facility], [place], if her mental status stabalizes. Please reconsult if GOC or condition changes. Rocio Perez NP

## 2022-02-15 NOTE — PROGRESS NOTE ADULT - SUBJECTIVE AND OBJECTIVE BOX
Patient is a 89y old  Female who presents with a chief complaint of S/P Fall Repair of L Intertrochanteric Fx (11 Feb 2022 12:27)  Pt is confuse but calm/pleasant am  denies any pain,sob,cp.    REVIEW OF SYSTEMS: All systems are reviewed and found to be negative except above--not reliable for mental status     MEDICATIONS  (STANDING):  acetaminophen     Tablet .. 975 milliGRAM(s) Oral every 8 hours  artificial  tears Solution 1 Drop(s) Right EYE every 6 hours  ascorbic acid 500 milliGRAM(s) Oral two times a day  aspirin  chewable 81 milliGRAM(s) Oral daily  atorvastatin 20 milliGRAM(s) Oral at bedtime  celecoxib 200 milliGRAM(s) Oral daily  enoxaparin Injectable 40 milliGRAM(s) SubCutaneous daily  ergocalciferol 99805 Unit(s) Oral <User Schedule>  ferrous    sulfate 325 milliGRAM(s) Oral daily  levothyroxine 25 MICROGram(s) Oral daily  melatonin 3 milliGRAM(s) Oral at bedtime  polyethylene glycol 3350 17 Gram(s) Oral two times a day  risperiDONE   Tablet 0.5 milliGRAM(s) Oral at bedtime  risperiDONE  Disintegrating Tablet 0.5 milliGRAM(s) Oral daily  senna 2 Tablet(s) Oral at bedtime  tamsulosin 0.4 milliGRAM(s) Oral at bedtime  tranexamic acid IVPB 1000 milliGRAM(s) IV Intermittent once    MEDICATIONS  (PRN):  bisacodyl Suppository 10 milliGRAM(s) Rectal daily PRN Constipation  mineral oil enema 133 milliLiter(s) Rectal once PRN persistent constipation  QUEtiapine 12.5 milliGRAM(s) Oral every 8 hours PRN restlessness      CAPILLARY BLOOD GLUCOSE        I&O's Summary    14 Feb 2022 07:01  -  15 Feb 2022 07:00  --------------------------------------------------------  IN: 300 mL / OUT: 1150 mL / NET: -850 mL    15 Feb 2022 07:01  -  15 Feb 2022 11:53  --------------------------------------------------------  IN: 0 mL / OUT: 275 mL / NET: -275 mL        PHYSICAL EXAM:  Vital Signs Last 24 Hrs  T(C): 36.7 (15 Feb 2022 09:05), Max: 37.2 (14 Feb 2022 16:35)  T(F): 98.1 (15 Feb 2022 09:05), Max: 99 (14 Feb 2022 16:35)  HR: 82 (15 Feb 2022 09:05) (82 - 94)  BP: 115/68 (15 Feb 2022 09:05) (107/66 - 115/68)  BP(mean): --  RR: 19 (15 Feb 2022 09:05) (18 - 19)  SpO2: 95% (15 Feb 2022 09:05) (94% - 95%)    CONSTITUTIONAL: NAD,  EYES: PERRLA; conjunctiva and sclera clear  ENMT: Moist oral mucosa,   RESPIRATORY: Normal respiratory effort; lungs are clear to auscultation bilaterally  CARDIOVASCULAR: Regular rate and rhythm, normal S1 and S2, no murmur   EXTS: No lower extremity edema; Peripheral pulses are 2+ bilaterally  ABDOMEN: Nontender to palpation, normoactive bowel sounds, no rebound/guarding;   MUSCLOSKELETAL: no joint swelling or tenderness to palpation  PSYCH: calm,coop am  NEUROLOGY: A+O to person, Not to place, and time; no gross sensory/motor  deficits;       LABS:                        8.4    9.95  )-----------( 471      ( 15 Feb 2022 07:16 )             26.9                       RADIOLOGY & ADDITIONAL TESTS:  Results Reviewed:

## 2022-02-16 PROCEDURE — 99232 SBSQ HOSP IP/OBS MODERATE 35: CPT

## 2022-02-16 RX ADMIN — RISPERIDONE 0.5 MILLIGRAM(S): 4 TABLET ORAL at 21:24

## 2022-02-16 RX ADMIN — SENNA PLUS 2 TABLET(S): 8.6 TABLET ORAL at 21:25

## 2022-02-16 RX ADMIN — Medication 325 MILLIGRAM(S): at 12:14

## 2022-02-16 RX ADMIN — Medication 975 MILLIGRAM(S): at 05:34

## 2022-02-16 RX ADMIN — Medication 975 MILLIGRAM(S): at 06:15

## 2022-02-16 RX ADMIN — RISPERIDONE 0.5 MILLIGRAM(S): 4 TABLET ORAL at 12:14

## 2022-02-16 RX ADMIN — Medication 500 MILLIGRAM(S): at 05:35

## 2022-02-16 RX ADMIN — TAMSULOSIN HYDROCHLORIDE 0.4 MILLIGRAM(S): 0.4 CAPSULE ORAL at 21:24

## 2022-02-16 RX ADMIN — POLYETHYLENE GLYCOL 3350 17 GRAM(S): 17 POWDER, FOR SOLUTION ORAL at 16:22

## 2022-02-16 RX ADMIN — FENTANYL CITRATE 1 PATCH: 50 INJECTION INTRAVENOUS at 07:47

## 2022-02-16 RX ADMIN — Medication 500 MILLIGRAM(S): at 16:22

## 2022-02-16 RX ADMIN — FENTANYL CITRATE 1 PATCH: 50 INJECTION INTRAVENOUS at 19:50

## 2022-02-16 RX ADMIN — Medication 25 MICROGRAM(S): at 05:34

## 2022-02-16 RX ADMIN — Medication 975 MILLIGRAM(S): at 21:24

## 2022-02-16 RX ADMIN — Medication 81 MILLIGRAM(S): at 12:14

## 2022-02-16 RX ADMIN — Medication 3 MILLIGRAM(S): at 21:24

## 2022-02-16 RX ADMIN — Medication 1 DROP(S): at 05:34

## 2022-02-16 RX ADMIN — Medication 1 DROP(S): at 16:22

## 2022-02-16 RX ADMIN — Medication 975 MILLIGRAM(S): at 22:00

## 2022-02-16 RX ADMIN — ATORVASTATIN CALCIUM 20 MILLIGRAM(S): 80 TABLET, FILM COATED ORAL at 21:24

## 2022-02-16 RX ADMIN — Medication 1 DROP(S): at 12:20

## 2022-02-16 RX ADMIN — POLYETHYLENE GLYCOL 3350 17 GRAM(S): 17 POWDER, FOR SOLUTION ORAL at 05:35

## 2022-02-16 RX ADMIN — ENOXAPARIN SODIUM 40 MILLIGRAM(S): 100 INJECTION SUBCUTANEOUS at 12:14

## 2022-02-16 NOTE — PROGRESS NOTE ADULT - ASSESSMENT
89 yr old female with dementia, hypothyroidism, TIA presented after a fall, imaging revealed an acute left intertrochanteric fracture, labs with WBC 18 on admission. She is s/p IM nail of left hip. Post operative course was complicated by anemia, Hb 7.4. PRBC transfusion was given. Also developed urinary retention requiring Stover placement. She developed agitation and pulled out Stover, required constant observation. Her Hb did not improve, was 7.4 on 2/3, hence additional PRBC ordered and CT abdomen/pelvis with hip hematoma.  Hgb has been stable.  S/p IMN on 2/1.      Acute lt comminuted intertrochanteric fracture due to fall s/p IM Nails on feb 1   - pain control  - dvt ppx  -PT eval   - dispo: [facility].  - palliative care team following .   - cont pt/ot daily as able.      Dementia  with delirium  - with intermittent  episode of agitation , impulsive behaviour   - cont risperidone , seroquel prn   pain meds.       Urinary retention stover in place.    -pt was offf stover but reinsert 02/15 as pt was retaining urine. straight cath x2 in 24hr     -continue Flomax.      Vit d deficiency    -started ergocalciferol 44800 unit weekly     Constipation - bowel regimen.  KUB ordered cont miralxa senna  will give lactulose if still no BM by evening add enema     hx TIA:   On aspirin and statin    Hypothyroidism: Continue Synthroid     Severe protein manjeet mlanutrition   cont diet and ensure   MVI with minerals     Anemia post op   iron studies reviewed   cont  ferrous sulfate vit c daily   Hb 7.8  f/u am    DVT ppx - on heparin   disposition: [facility] for safe disposition, bed offered, carson in contact with Arabella solares rn  Code/social - MOLST completed DNR   Spoke with  arabella is the HCP ,  Navya, cell: [phone]  answers all quesions best of my knowledge.

## 2022-02-16 NOTE — PROGRESS NOTE ADULT - SUBJECTIVE AND OBJECTIVE BOX
Patient is a 89y old  Female who presents with a chief complaint of S/P Fall Repair of L Intertrochanteric Fx (11 Feb 2022 12:27)      Pt is sitting on chair,just finished breakfas. Ate well. denies any pain,sob,cp,abd pain.  REVIEW OF SYSTEMS: Ams- not reliable    MEDICATIONS  (STANDING):  acetaminophen     Tablet .. 975 milliGRAM(s) Oral every 8 hours  artificial  tears Solution 1 Drop(s) Right EYE every 6 hours  ascorbic acid 500 milliGRAM(s) Oral two times a day  aspirin  chewable 81 milliGRAM(s) Oral daily  atorvastatin 20 milliGRAM(s) Oral at bedtime  celecoxib 200 milliGRAM(s) Oral daily  enoxaparin Injectable 40 milliGRAM(s) SubCutaneous daily  ergocalciferol 78048 Unit(s) Oral <User Schedule>  ferrous    sulfate 325 milliGRAM(s) Oral daily  levothyroxine 25 MICROGram(s) Oral daily  melatonin 3 milliGRAM(s) Oral at bedtime  polyethylene glycol 3350 17 Gram(s) Oral two times a day  risperiDONE   Tablet 0.5 milliGRAM(s) Oral at bedtime  risperiDONE  Disintegrating Tablet 0.5 milliGRAM(s) Oral daily  senna 2 Tablet(s) Oral at bedtime  tamsulosin 0.4 milliGRAM(s) Oral at bedtime  tranexamic acid IVPB 1000 milliGRAM(s) IV Intermittent once    MEDICATIONS  (PRN):  bisacodyl Suppository 10 milliGRAM(s) Rectal daily PRN Constipation  mineral oil enema 133 milliLiter(s) Rectal once PRN persistent constipation  QUEtiapine 12.5 milliGRAM(s) Oral every 8 hours PRN restlessness      CAPILLARY BLOOD GLUCOSE        I&O's Summary    15 Feb 2022 07:01  -  16 Feb 2022 07:00  --------------------------------------------------------  IN: 0 mL / OUT: 575 mL / NET: -575 mL        PHYSICAL EXAM:  Vital Signs Last 24 Hrs  T(C): 36.7 (16 Feb 2022 09:02), Max: 37.3 (15 Feb 2022 21:55)  T(F): 98.1 (16 Feb 2022 09:02), Max: 99.1 (15 Feb 2022 21:55)  HR: 91 (16 Feb 2022 09:02) (64 - 91)  BP: 106/63 (16 Feb 2022 09:02) (106/63 - 143/75)  BP(mean): --  RR: 18 (16 Feb 2022 09:02) (18 - 18)  SpO2: 92% (16 Feb 2022 09:02) (92% - 94%)    CONSTITUTIONAL: NAD,  EYES: PERRLA; conjunctiva and sclera clear  ENMT: Moist oral mucosa,   RESPIRATORY: Normal respiratory effort; lungs are clear to auscultation bilaterally  CARDIOVASCULAR: Regular rate and rhythm, normal S1 and S2, no murmur   EXTS: No lower extremity edema; Peripheral pulses are 2+ bilaterally  ABDOMEN: Nontender to palpation, normoactive bowel sounds, no rebound/guarding;   MUSCLOSKELETAL:   no cyanosis of digits; no joint  tenderness to palpation  PSYCH: coop,calm,confuse  NEUROLOGY: A+O to self, not to place, and time; +m/s      LABS:                        8.4    9.95  )-----------( 471      ( 15 Feb 2022 07:16 )             26.9                       RADIOLOGY & ADDITIONAL TESTS:  Results Reviewed:

## 2022-02-16 NOTE — PROGRESS NOTE ADULT - NUTRITIONAL ASSESSMENT
This patient has been assessed with a concern for Malnutrition and has been determined to have a diagnosis/diagnoses of Severe protein-calorie malnutrition.    This patient is being managed with:   Diet Regular-  Supplement Feeding Modality:  Oral  Ensure Clear Cans or Servings Per Day:  3       Frequency:  Three Times a day  Entered: Feb  3 2022  4:39PM    

## 2022-02-16 NOTE — CHART NOTE - NSCHARTNOTEFT_GEN_A_CORE
Source: Patient [ ]  Family [ ]   other [ ]  89 yr old female with dementia, hypothyroidism, TIA presented after a fall, imaging revealed an acute left intertrochanteric fracture, labs with WBC 18 on admission. She is s/p IM nail of left hip. Post operative course was complicated by anemia, Hb 7.4. PRBC transfusion was given. Also developed urinary retention requiring Brenner placement. She developed agitation and pulled out Brenner, required constant observation. Her Hb did not improve, was 7.4 on 2/3, hence additional PRBC ordered and CT abdomen/pelvis with hip hematoma.  Hgb has been stable.  S/p IMN on 2/1.    Acute lt comminuted intertrochanteric fracture due to fall s/p IM Nails on feb 1     Current Diet: Diet, Regular:   Supplement Feeding Modality:  Oral  Ensure Clear Cans or Servings Per Day:  3       Frequency:  Three Times a day (02-03-22 @ 16:39)      Patient reports [ ] nausea  [ ] vomiting [ ] diarrhea [ ] constipation  [ ]chewing problems [ ] swallowing issues  [ ] other:     PO intake:  < 50% [x ]   50-75%  [ ]   %  [ ]  other :    Source for PO intake [ ] Patient [x ] family [ ] chart [ ] staff [ ] other        Current Weight:   2/16 58.8 kg  2/12 62.1 kg  2/9 64.9 kg  2/2 57.9 kg  2/1 49.9 kg    % Weight Change   ? accuracy of recorded weights  1+ edema to left hip    Pertinent Medications: MEDICATIONS  (STANDING):  acetaminophen     Tablet .. 975 milliGRAM(s) Oral every 8 hours  artificial  tears Solution 1 Drop(s) Right EYE every 6 hours  ascorbic acid 500 milliGRAM(s) Oral two times a day  aspirin  chewable 81 milliGRAM(s) Oral daily  atorvastatin 20 milliGRAM(s) Oral at bedtime  celecoxib 200 milliGRAM(s) Oral daily  enoxaparin Injectable 40 milliGRAM(s) SubCutaneous daily  ergocalciferol 51946 Unit(s) Oral <User Schedule>  ferrous    sulfate 325 milliGRAM(s) Oral daily  levothyroxine 25 MICROGram(s) Oral daily  melatonin 3 milliGRAM(s) Oral at bedtime  polyethylene glycol 3350 17 Gram(s) Oral two times a day  risperiDONE   Tablet 0.5 milliGRAM(s) Oral at bedtime  risperiDONE  Disintegrating Tablet 0.5 milliGRAM(s) Oral daily  senna 2 Tablet(s) Oral at bedtime  tamsulosin 0.4 milliGRAM(s) Oral at bedtime  tranexamic acid IVPB 1000 milliGRAM(s) IV Intermittent once    MEDICATIONS  (PRN):  bisacodyl Suppository 10 milliGRAM(s) Rectal daily PRN Constipation  mineral oil enema 133 milliLiter(s) Rectal once PRN persistent constipation  QUEtiapine 12.5 milliGRAM(s) Oral every 8 hours PRN restlessness    Pertinent Labs: CBC Full  -  ( 15 Feb 2022 07:16 )  WBC Count : 9.95 K/uL  RBC Count : 2.84 M/uL  Hemoglobin : 8.4 g/dL  Hematocrit : 26.9 %  Platelet Count - Automated : 471 K/uL  Mean Cell Volume : 94.7 fl  Mean Cell Hemoglobin : 29.6 pg  Mean Cell Hemoglobin Concentration : 31.2 gm/dL  Auto Neutrophil # : x  Auto Lymphocyte # : x  Auto Monocyte # : x  Auto Eosinophil # : x  Auto Basophil # : x  Auto Neutrophil % : x  Auto Lymphocyte % : x  Auto Monocyte % : x  Auto Eosinophil % : x  Auto Basophil % : x          Skin: sx incision    Nutrition focused physical exam conducted - found signs of malnutrition [ ]absent [ x]present    Subcutaneous fat loss: [x ] Orbital fat pads region, [x ]Buccal fat region, [ ]Triceps region,  [ ]Ribs region    Muscle wasting: [x ]Temples region, [x ]Clavicle region, [ ]Shoulder region, [ ]Scapula region, [ ]Interosseous region,  [ ]thigh region, [ ]Calf region    Estimated Needs:   [x ] no change since previous assessment  [ ] recalculated:     Current Nutrition Diagnosis: Pt presents ar risk secondary to Malnutrition severe acute, related to inadequate protein calorie intake in the setting of advanced dementia, as evidenced by PO<50% est needs and physical findings.     Recommendations:   1) Family requesting minced and moist diet  2) change ensure clear to ensure enlive (vanilla)  3)RX MVI   4) Swallow eval?          Monitoring and Evaluation:   [x ] PO intake [ x] Tolerance to diet prescription [X] Weights  [X] Follow up per protocol [X] Labs:

## 2022-02-16 NOTE — PROGRESS NOTE ADULT - PROVIDER SPECIALTY LIST ADULT
Hospitalist
Orthopedics
Orthopedics
Palliative Care
Hospitalist
Orthopedics
Palliative Care
Hospitalist
Hospitalist
Palliative Care
Trauma Surgery
Hospitalist
Palliative Care

## 2022-02-17 ENCOUNTER — TRANSCRIPTION ENCOUNTER (OUTPATIENT)
Age: 87
End: 2022-02-17

## 2022-02-17 VITALS
DIASTOLIC BLOOD PRESSURE: 67 MMHG | TEMPERATURE: 98 F | SYSTOLIC BLOOD PRESSURE: 135 MMHG | OXYGEN SATURATION: 92 % | HEART RATE: 71 BPM

## 2022-02-17 PROCEDURE — 82728 ASSAY OF FERRITIN: CPT

## 2022-02-17 PROCEDURE — 36430 TRANSFUSION BLD/BLD COMPNT: CPT

## 2022-02-17 PROCEDURE — 74176 CT ABD & PELVIS W/O CONTRAST: CPT

## 2022-02-17 PROCEDURE — 83550 IRON BINDING TEST: CPT

## 2022-02-17 PROCEDURE — C1713: CPT

## 2022-02-17 PROCEDURE — 74018 RADEX ABDOMEN 1 VIEW: CPT

## 2022-02-17 PROCEDURE — 85610 PROTHROMBIN TIME: CPT

## 2022-02-17 PROCEDURE — 72192 CT PELVIS W/O DYE: CPT | Mod: MD

## 2022-02-17 PROCEDURE — U0005: CPT

## 2022-02-17 PROCEDURE — 70450 CT HEAD/BRAIN W/O DYE: CPT | Mod: MA

## 2022-02-17 PROCEDURE — 97116 GAIT TRAINING THERAPY: CPT

## 2022-02-17 PROCEDURE — 86901 BLOOD TYPING SEROLOGIC RH(D): CPT

## 2022-02-17 PROCEDURE — 85730 THROMBOPLASTIN TIME PARTIAL: CPT

## 2022-02-17 PROCEDURE — 86923 COMPATIBILITY TEST ELECTRIC: CPT

## 2022-02-17 PROCEDURE — 82306 VITAMIN D 25 HYDROXY: CPT

## 2022-02-17 PROCEDURE — 84466 ASSAY OF TRANSFERRIN: CPT

## 2022-02-17 PROCEDURE — 76000 FLUOROSCOPY <1 HR PHYS/QHP: CPT

## 2022-02-17 PROCEDURE — 86850 RBC ANTIBODY SCREEN: CPT

## 2022-02-17 PROCEDURE — 36415 COLL VENOUS BLD VENIPUNCTURE: CPT

## 2022-02-17 PROCEDURE — 85025 COMPLETE CBC W/AUTO DIFF WBC: CPT

## 2022-02-17 PROCEDURE — C1769: CPT

## 2022-02-17 PROCEDURE — 97530 THERAPEUTIC ACTIVITIES: CPT

## 2022-02-17 PROCEDURE — 84100 ASSAY OF PHOSPHORUS: CPT

## 2022-02-17 PROCEDURE — 82607 VITAMIN B-12: CPT

## 2022-02-17 PROCEDURE — P9040: CPT

## 2022-02-17 PROCEDURE — 73502 X-RAY EXAM HIP UNI 2-3 VIEWS: CPT

## 2022-02-17 PROCEDURE — 80053 COMPREHEN METABOLIC PANEL: CPT

## 2022-02-17 PROCEDURE — U0003: CPT

## 2022-02-17 PROCEDURE — 74177 CT ABD & PELVIS W/CONTRAST: CPT | Mod: MA

## 2022-02-17 PROCEDURE — 83735 ASSAY OF MAGNESIUM: CPT

## 2022-02-17 PROCEDURE — 72125 CT NECK SPINE W/O DYE: CPT | Mod: MA

## 2022-02-17 PROCEDURE — 80048 BASIC METABOLIC PNL TOTAL CA: CPT

## 2022-02-17 PROCEDURE — 83540 ASSAY OF IRON: CPT

## 2022-02-17 PROCEDURE — 99239 HOSP IP/OBS DSCHRG MGMT >30: CPT

## 2022-02-17 PROCEDURE — 85027 COMPLETE CBC AUTOMATED: CPT

## 2022-02-17 PROCEDURE — 71045 X-RAY EXAM CHEST 1 VIEW: CPT

## 2022-02-17 PROCEDURE — 99285 EMERGENCY DEPT VISIT HI MDM: CPT

## 2022-02-17 PROCEDURE — 97110 THERAPEUTIC EXERCISES: CPT

## 2022-02-17 PROCEDURE — 86900 BLOOD TYPING SEROLOGIC ABO: CPT

## 2022-02-17 PROCEDURE — 72170 X-RAY EXAM OF PELVIS: CPT

## 2022-02-17 PROCEDURE — 84443 ASSAY THYROID STIM HORMONE: CPT

## 2022-02-17 PROCEDURE — P9016: CPT

## 2022-02-17 PROCEDURE — 71260 CT THORAX DX C+: CPT | Mod: MA

## 2022-02-17 PROCEDURE — 93005 ELECTROCARDIOGRAM TRACING: CPT

## 2022-02-17 RX ADMIN — Medication 325 MILLIGRAM(S): at 12:07

## 2022-02-17 RX ADMIN — Medication 1 DROP(S): at 05:55

## 2022-02-17 RX ADMIN — Medication 500 MILLIGRAM(S): at 05:56

## 2022-02-17 RX ADMIN — CELECOXIB 200 MILLIGRAM(S): 200 CAPSULE ORAL at 12:51

## 2022-02-17 RX ADMIN — CELECOXIB 200 MILLIGRAM(S): 200 CAPSULE ORAL at 12:07

## 2022-02-17 RX ADMIN — Medication 81 MILLIGRAM(S): at 12:06

## 2022-02-17 RX ADMIN — ENOXAPARIN SODIUM 40 MILLIGRAM(S): 100 INJECTION SUBCUTANEOUS at 12:06

## 2022-02-17 RX ADMIN — Medication 975 MILLIGRAM(S): at 05:56

## 2022-02-17 RX ADMIN — RISPERIDONE 0.5 MILLIGRAM(S): 4 TABLET ORAL at 12:21

## 2022-02-17 RX ADMIN — Medication 975 MILLIGRAM(S): at 07:18

## 2022-02-17 RX ADMIN — POLYETHYLENE GLYCOL 3350 17 GRAM(S): 17 POWDER, FOR SOLUTION ORAL at 05:56

## 2022-02-17 RX ADMIN — Medication 25 MICROGRAM(S): at 05:56

## 2022-02-17 RX ADMIN — FENTANYL CITRATE 1 PATCH: 50 INJECTION INTRAVENOUS at 07:18

## 2022-02-17 RX ADMIN — FENTANYL CITRATE 1 PATCH: 50 INJECTION INTRAVENOUS at 12:51

## 2022-02-17 NOTE — DISCHARGE NOTE NURSING/CASE MANAGEMENT/SOCIAL WORK - NSDCVIVACCINE_GEN_ALL_CORE_FT
Td (adult) preservative free; 15-Aug-2020 01:16; Carmen Bangura); [employer]; [id number] (Exp. Date: 04-Apr-2022); IntraMuscular; Deltoid Left.; 0.5 milliLiter(s); VIS (VIS Published: 15-Aug-2020, VIS Presented: 15-Aug-2020);

## 2022-02-17 NOTE — DISCHARGE NOTE NURSING/CASE MANAGEMENT/SOCIAL WORK - PATIENT PORTAL LINK FT
You can access the FollowMyHealth Patient Portal offered by [facility] by registering at the following website: [email or web address]. By joining [facility]’s FollowMyHealth portal, you will also be able to view your health information using other applications (apps) compatible with our system.

## 2022-02-17 NOTE — DISCHARGE NOTE NURSING/CASE MANAGEMENT/SOCIAL WORK - NSDCPEFALRISK_GEN_ALL_CORE
For information on Fall & Injury Prevention, visit: [email or web address] OR  [email or web address] OR  https://www.cdc.gov/steadi/patient.html

## 2022-03-01 ENCOUNTER — APPOINTMENT (OUTPATIENT)
Dept: NEUROLOGY | Facility: CLINIC | Age: 87
End: 2022-03-01

## 2022-03-07 PROBLEM — Z78.9 OTHER SPECIFIED HEALTH STATUS: Chronic | Status: ACTIVE | Noted: 2022-02-01

## 2022-03-07 PROBLEM — H91.90 UNSPECIFIED HEARING LOSS, UNSPECIFIED EAR: Chronic | Status: ACTIVE | Noted: 2022-02-01

## 2022-03-16 ENCOUNTER — APPOINTMENT (OUTPATIENT)
Dept: ORTHOPEDIC SURGERY | Facility: CLINIC | Age: 87
End: 2022-03-16
Payer: MEDICARE

## 2022-03-16 DIAGNOSIS — S72.142D DISPLACED INTERTROCHANTERIC FRACTURE OF LEFT FEMUR, SUBSEQUENT ENCOUNTER FOR CLOSED FRACTURE WITH ROUTINE HEALING: ICD-10-CM

## 2022-03-16 PROCEDURE — 73502 X-RAY EXAM HIP UNI 2-3 VIEWS: CPT | Mod: LT

## 2022-03-16 PROCEDURE — 99024 POSTOP FOLLOW-UP VISIT: CPT

## 2022-03-16 NOTE — HISTORY OF PRESENT ILLNESS
[3] : the patient reports pain that is 3/10 in severity [Chills] : no chills [Constipation] : no constipation [Diarrhea] : no diarrhea [Dysuria] : no dysuria [Fever] : no fever [Nausea] : no nausea [Vomiting] : no vomiting [Clean/Dry/Intact] : clean, dry and intact [Healed] : healed [Erythema] : not erythematous [Discharge] : absent of discharge [Swelling] : swollen [Neuro Intact] : an unremarkable neurological exam [Vascular Intact] : ~T peripheral vascular exam normal [Xray (Date:___)] : [unfilled] Xray -  [Hardware in Good Position] : hardware in good position [Good Overall Alignment] : good overall alignment [Doing Well] : is doing well [No Sign of Infection] : is showing no signs of infection [Adequate Pain Control] : has adequate pain control [None] : None [de-identified] :  Left hip intramedullary nail.\par  Left hip arthrogram\par DOS 02/01/2022 [de-identified] : 2 views left hip [de-identified] : WBAT, no restrictions\par \par Osteopenia and osteoporosis are significant risk factors for fragility fractures. Given the type of fracture that has occurred, I would highly recommend that the patient followup with their primary care physician to discuss treatment for low bone mineral density. We discussed the benefits of these medications frequently far outweigh the small risks. Their primary care physician can call the office with any specific questions or concerns.\par \par As long as she continues to improve, she may follow-up as needed

## 2022-04-20 ENCOUNTER — EMERGENCY (EMERGENCY)
Facility: HOSPITAL | Age: 87
LOS: 1 days | Discharge: HOME | End: 2022-04-20
Attending: EMERGENCY MEDICINE
Payer: MEDICARE

## 2022-04-20 VITALS
HEART RATE: 78 BPM | HEIGHT: 57 IN | RESPIRATION RATE: 17 BRPM | SYSTOLIC BLOOD PRESSURE: 153 MMHG | OXYGEN SATURATION: 99 % | DIASTOLIC BLOOD PRESSURE: 81 MMHG | TEMPERATURE: 99 F

## 2022-04-20 LAB
ALBUMIN SERPL ELPH-MCNC: 3.4 G/DL — SIGNIFICANT CHANGE UP (ref 3.3–5.2)
ALP SERPL-CCNC: 104 U/L — SIGNIFICANT CHANGE UP (ref 40–120)
ALT FLD-CCNC: 10 U/L — SIGNIFICANT CHANGE UP
ANION GAP SERPL CALC-SCNC: 14 MMOL/L — SIGNIFICANT CHANGE UP (ref 5–17)
AST SERPL-CCNC: 16 U/L — SIGNIFICANT CHANGE UP
BASOPHILS # BLD AUTO: 0.03 K/UL — SIGNIFICANT CHANGE UP (ref 0–0.2)
BASOPHILS NFR BLD AUTO: 0.4 % — SIGNIFICANT CHANGE UP (ref 0–2)
BILIRUB SERPL-MCNC: 0.3 MG/DL — LOW (ref 0.4–2)
BUN SERPL-MCNC: 33.1 MG/DL — HIGH (ref 8–20)
CALCIUM SERPL-MCNC: 8.5 MG/DL — LOW (ref 8.6–10.2)
CHLORIDE SERPL-SCNC: 105 MMOL/L — SIGNIFICANT CHANGE UP (ref 98–107)
CO2 SERPL-SCNC: 20 MMOL/L — LOW (ref 22–29)
CREAT SERPL-MCNC: 1.08 MG/DL — SIGNIFICANT CHANGE UP (ref 0.5–1.3)
EGFR: 48 ML/MIN/1.73M2 — LOW
EOSINOPHIL # BLD AUTO: 0.27 K/UL — SIGNIFICANT CHANGE UP (ref 0–0.5)
EOSINOPHIL NFR BLD AUTO: 3.5 % — SIGNIFICANT CHANGE UP (ref 0–6)
GLUCOSE SERPL-MCNC: 96 MG/DL — SIGNIFICANT CHANGE UP (ref 70–99)
HCT VFR BLD CALC: 34.1 % — LOW (ref 34.5–45)
HGB BLD-MCNC: 11.3 G/DL — LOW (ref 11.5–15.5)
IMM GRANULOCYTES NFR BLD AUTO: 0.4 % — SIGNIFICANT CHANGE UP (ref 0–1.5)
LYMPHOCYTES # BLD AUTO: 1.03 K/UL — SIGNIFICANT CHANGE UP (ref 1–3.3)
LYMPHOCYTES # BLD AUTO: 13.5 % — SIGNIFICANT CHANGE UP (ref 13–44)
MCHC RBC-ENTMCNC: 30.8 PG — SIGNIFICANT CHANGE UP (ref 27–34)
MCHC RBC-ENTMCNC: 33.1 GM/DL — SIGNIFICANT CHANGE UP (ref 32–36)
MCV RBC AUTO: 92.9 FL — SIGNIFICANT CHANGE UP (ref 80–100)
MONOCYTES # BLD AUTO: 0.78 K/UL — SIGNIFICANT CHANGE UP (ref 0–0.9)
MONOCYTES NFR BLD AUTO: 10.2 % — SIGNIFICANT CHANGE UP (ref 2–14)
NEUTROPHILS # BLD AUTO: 5.5 K/UL — SIGNIFICANT CHANGE UP (ref 1.8–7.4)
NEUTROPHILS NFR BLD AUTO: 72 % — SIGNIFICANT CHANGE UP (ref 43–77)
PLATELET # BLD AUTO: 238 K/UL — SIGNIFICANT CHANGE UP (ref 150–400)
POTASSIUM SERPL-MCNC: 4.7 MMOL/L — SIGNIFICANT CHANGE UP (ref 3.5–5.3)
POTASSIUM SERPL-SCNC: 4.7 MMOL/L — SIGNIFICANT CHANGE UP (ref 3.5–5.3)
PROT SERPL-MCNC: 6 G/DL — LOW (ref 6.6–8.7)
RBC # BLD: 3.67 M/UL — LOW (ref 3.8–5.2)
RBC # FLD: 15.2 % — HIGH (ref 10.3–14.5)
SODIUM SERPL-SCNC: 139 MMOL/L — SIGNIFICANT CHANGE UP (ref 135–145)
WBC # BLD: 7.64 K/UL — SIGNIFICANT CHANGE UP (ref 3.8–10.5)
WBC # FLD AUTO: 7.64 K/UL — SIGNIFICANT CHANGE UP (ref 3.8–10.5)

## 2022-04-20 PROCEDURE — 99220: CPT

## 2022-04-20 PROCEDURE — 73090 X-RAY EXAM OF FOREARM: CPT | Mod: 26,LT

## 2022-04-20 PROCEDURE — 73110 X-RAY EXAM OF WRIST: CPT | Mod: 26,RT

## 2022-04-20 PROCEDURE — 93010 ELECTROCARDIOGRAM REPORT: CPT

## 2022-04-20 PROCEDURE — 70450 CT HEAD/BRAIN W/O DYE: CPT | Mod: 26,MA

## 2022-04-20 PROCEDURE — 73522 X-RAY EXAM HIPS BI 3-4 VIEWS: CPT | Mod: 26

## 2022-04-20 RX ORDER — LANOLIN ALCOHOL/MO/W.PET/CERES
3 CREAM (GRAM) TOPICAL AT BEDTIME
Refills: 0 | Status: DISCONTINUED | OUTPATIENT
Start: 2022-04-20 | End: 2022-04-25

## 2022-04-20 RX ORDER — RISPERIDONE 4 MG/1
0.5 TABLET ORAL AT BEDTIME
Refills: 0 | Status: DISCONTINUED | OUTPATIENT
Start: 2022-04-20 | End: 2022-04-25

## 2022-04-20 RX ORDER — ASCORBIC ACID 60 MG
500 TABLET,CHEWABLE ORAL DAILY
Refills: 0 | Status: DISCONTINUED | OUTPATIENT
Start: 2022-04-20 | End: 2022-04-25

## 2022-04-20 RX ORDER — LEVOTHYROXINE SODIUM 125 MCG
25 TABLET ORAL DAILY
Refills: 0 | Status: DISCONTINUED | OUTPATIENT
Start: 2022-04-20 | End: 2022-04-25

## 2022-04-20 RX ORDER — MINERAL OIL
133 OIL (ML) MISCELLANEOUS DAILY
Refills: 0 | Status: DISCONTINUED | OUTPATIENT
Start: 2022-04-20 | End: 2022-04-20

## 2022-04-20 RX ORDER — ASPIRIN/CALCIUM CARB/MAGNESIUM 324 MG
81 TABLET ORAL DAILY
Refills: 0 | Status: DISCONTINUED | OUTPATIENT
Start: 2022-04-20 | End: 2022-04-25

## 2022-04-20 RX ORDER — TAMSULOSIN HYDROCHLORIDE 0.4 MG/1
0.4 CAPSULE ORAL AT BEDTIME
Refills: 0 | Status: DISCONTINUED | OUTPATIENT
Start: 2022-04-20 | End: 2022-04-25

## 2022-04-20 RX ORDER — ERGOCALCIFEROL 1.25 MG/1
50000 CAPSULE ORAL
Refills: 0 | Status: DISCONTINUED | OUTPATIENT
Start: 2022-04-20 | End: 2022-04-25

## 2022-04-20 RX ORDER — ENOXAPARIN SODIUM 100 MG/ML
40 INJECTION SUBCUTANEOUS EVERY 24 HOURS
Refills: 0 | Status: DISCONTINUED | OUTPATIENT
Start: 2022-04-20 | End: 2022-04-25

## 2022-04-20 RX ORDER — CELECOXIB 200 MG/1
200 CAPSULE ORAL DAILY
Refills: 0 | Status: DISCONTINUED | OUTPATIENT
Start: 2022-04-20 | End: 2022-04-25

## 2022-04-20 RX ORDER — ACETAMINOPHEN 500 MG
975 TABLET ORAL EVERY 8 HOURS
Refills: 0 | Status: DISCONTINUED | OUTPATIENT
Start: 2022-04-20 | End: 2022-04-25

## 2022-04-20 RX ORDER — ATORVASTATIN CALCIUM 80 MG/1
20 TABLET, FILM COATED ORAL AT BEDTIME
Refills: 0 | Status: DISCONTINUED | OUTPATIENT
Start: 2022-04-20 | End: 2022-04-25

## 2022-04-20 RX ORDER — POLYETHYLENE GLYCOL 3350 17 G/17G
17 POWDER, FOR SOLUTION ORAL
Refills: 0 | Status: DISCONTINUED | OUTPATIENT
Start: 2022-04-20 | End: 2022-04-25

## 2022-04-20 RX ORDER — SENNA PLUS 8.6 MG/1
2 TABLET ORAL AT BEDTIME
Refills: 0 | Status: DISCONTINUED | OUTPATIENT
Start: 2022-04-20 | End: 2022-04-25

## 2022-04-20 RX ORDER — MINERAL OIL
133 OIL (ML) MISCELLANEOUS DAILY
Refills: 0 | Status: DISCONTINUED | OUTPATIENT
Start: 2022-04-20 | End: 2022-04-25

## 2022-04-20 RX ORDER — TETANUS TOXOID, REDUCED DIPHTHERIA TOXOID AND ACELLULAR PERTUSSIS VACCINE, ADSORBED 5; 2.5; 8; 8; 2.5 [IU]/.5ML; [IU]/.5ML; UG/.5ML; UG/.5ML; UG/.5ML
0.5 SUSPENSION INTRAMUSCULAR ONCE
Refills: 0 | Status: COMPLETED | OUTPATIENT
Start: 2022-04-20 | End: 2022-04-20

## 2022-04-20 RX ORDER — RISPERIDONE 4 MG/1
0.5 TABLET ORAL DAILY
Refills: 0 | Status: DISCONTINUED | OUTPATIENT
Start: 2022-04-20 | End: 2022-04-25

## 2022-04-20 RX ORDER — QUETIAPINE FUMARATE 200 MG/1
12.5 TABLET, FILM COATED ORAL EVERY 8 HOURS
Refills: 0 | Status: DISCONTINUED | OUTPATIENT
Start: 2022-04-20 | End: 2022-04-25

## 2022-04-20 RX ORDER — FERROUS SULFATE 325(65) MG
325 TABLET ORAL DAILY
Refills: 0 | Status: DISCONTINUED | OUTPATIENT
Start: 2022-04-20 | End: 2022-04-25

## 2022-04-20 RX ADMIN — TETANUS TOXOID, REDUCED DIPHTHERIA TOXOID AND ACELLULAR PERTUSSIS VACCINE, ADSORBED 0.5 MILLILITER(S): 5; 2.5; 8; 8; 2.5 SUSPENSION INTRAMUSCULAR at 17:08

## 2022-04-20 RX ADMIN — Medication 975 MILLIGRAM(S): at 22:06

## 2022-04-20 NOTE — ED ADULT NURSE NOTE - OBJECTIVE STATEMENT
assumed care of pt  pt with small skin tear to RFA  gauze applied.   as per paperwork pt was found on floor at [facility]  pt states she lives [facility] and she is looking for her brother.   pt wearing glasses  pt pleasantly confused

## 2022-04-20 NOTE — ED PROVIDER NOTE - OBJECTIVE STATEMENT
89yof w/ dementia discharged from rehab after hip surgery to [facility] sent in after 3 falls in the past 3 days. Pt denies any complaints but is unable to provide any history. Has skin tear to right forearm, otherwise no injuries noted.

## 2022-04-20 NOTE — ED PROVIDER NOTE - CLINICAL SUMMARY MEDICAL DECISION MAKING FREE TEXT BOX
3 falls in 24 hours, small skin tear but otherwise no injuries apparent. [facility] refusing dc due to concern for safety. Will keep in ED obs for PT eval and disposition planning.

## 2022-04-20 NOTE — ED ADULT TRIAGE NOTE - NSPATIENTFLAG_GEN_A_ER
The patient tolerated exercise session well with no complaints  
Purple DH (Discharge Huddle; Vulnerable Patient)

## 2022-04-20 NOTE — ED CDU PROVIDER INITIAL DAY NOTE - NSICDXPASTMEDICALHX_GEN_ALL_CORE_FT
PAST MEDICAL HISTORY:  Arthritis     Degeneration macular     Dementia     [place] (hard of hearing)     HTN (hypertension)     Hypothyroidism     Osteoporosis     Poor historian

## 2022-04-20 NOTE — ED PROVIDER NOTE - NSICDXPASTMEDICALHX_GEN_ALL_CORE_FT
PAST MEDICAL HISTORY:  Arthritis     Degeneration macular     Dementia     [place] (hard of hearing)     HTN (hypertension)     Hypothyroidism     Osteoporosis     Poor historian

## 2022-04-20 NOTE — ED CDU PROVIDER INITIAL DAY NOTE - MEDICAL DECISION MAKING DETAILS
Pt is a 89 y.o. F hx dementia, HTN, hypothyroidism, and arthritis, presenting after 3 falls in 24 hours, small skin tear but otherwise no injuries apparent. Assisted living facility refusing ED discharge due to concern for safety. Pt requiring PT eval and social work consultation for further management and disposition planning. Pt with nephew at bedside, pleasantly demented, will require 1:1 when family not present. No acute changes or complaints at this time. Will restart home meds, diet, and reevaluate.

## 2022-04-20 NOTE — ED CDU PROVIDER INITIAL DAY NOTE - DIAGNOSIS COUNSELING, MDM
conducted a detailed discussion... Conducted detailed discussion especially about needs for PT evaluation in light of recent falls.

## 2022-04-20 NOTE — ED CDU PROVIDER INITIAL DAY NOTE - OBJECTIVE STATEMENT
89yof w/ dementia discharged from rehab after hip surgery to [facility] sent in after 3 falls in the past 3 days. Pt denies any complaints but is unable to provide any history. Has skin tear to right forearm, otherwise no injuries noted.

## 2022-04-20 NOTE — ED ADULT NURSE REASSESSMENT NOTE - NS ED NURSE REASSESS COMMENT FT1
pts nephew at bedside    questioned if he would transport pt back.   nephew declined  'let them take her'   aware will arrange transportation

## 2022-04-20 NOTE — ED ADULT TRIAGE NOTE - CHIEF COMPLAINT QUOTE
pt BIBA for multiple falls at assisted living, pt returned from rehab yesterday s/p right hip replacement.  pt at baseline mental status as per ems.  no blood thinners.

## 2022-04-20 NOTE — ED CDU PROVIDER INITIAL DAY NOTE - ATTENDING CONTRIBUTION TO CARE
Demetrice: I performed a face to face evaluation of this patient and performed a full history and physical examination on the patient.  I agree with the resident's history, physical examination, and plan of the patient unless otherwise noted. My brief assessment is as follows: multiple falls past few days, recently in rehab, poor historian. mateo unable to take pt back without 24 hour supervision, skin tear to right forearm, left leg shortened. obs for SW.

## 2022-04-20 NOTE — ED CDU PROVIDER INITIAL DAY NOTE - NEUROLOGICAL, MLM
Alert and oriented x1 (consistent with demented baseline as per family), no focal deficits, no motor or sensory deficits.

## 2022-04-21 VITALS
TEMPERATURE: 98 F | OXYGEN SATURATION: 93 % | RESPIRATION RATE: 16 BRPM | SYSTOLIC BLOOD PRESSURE: 155 MMHG | HEART RATE: 74 BPM | DIASTOLIC BLOOD PRESSURE: 77 MMHG

## 2022-04-21 LAB
APPEARANCE UR: CLEAR — SIGNIFICANT CHANGE UP
BACTERIA # UR AUTO: ABNORMAL
BILIRUB UR-MCNC: NEGATIVE — SIGNIFICANT CHANGE UP
COLOR SPEC: YELLOW — SIGNIFICANT CHANGE UP
DIFF PNL FLD: ABNORMAL
EPI CELLS # UR: SIGNIFICANT CHANGE UP
GLUCOSE UR QL: NEGATIVE — SIGNIFICANT CHANGE UP
KETONES UR-MCNC: NEGATIVE — SIGNIFICANT CHANGE UP
LEUKOCYTE ESTERASE UR-ACNC: ABNORMAL
NITRITE UR-MCNC: POSITIVE
PH UR: 5 — SIGNIFICANT CHANGE UP (ref 5–8)
PROT UR-MCNC: NEGATIVE — SIGNIFICANT CHANGE UP
RBC CASTS # UR COMP ASSIST: ABNORMAL /HPF (ref 0–4)
SP GR SPEC: 1.01 — SIGNIFICANT CHANGE UP (ref 1.01–1.02)
UROBILINOGEN FLD QL: NEGATIVE — SIGNIFICANT CHANGE UP
WBC UR QL: SIGNIFICANT CHANGE UP /HPF (ref 0–5)

## 2022-04-21 PROCEDURE — 87186 SC STD MICRODIL/AGAR DIL: CPT

## 2022-04-21 PROCEDURE — 70450 CT HEAD/BRAIN W/O DYE: CPT | Mod: MA

## 2022-04-21 PROCEDURE — 90471 IMMUNIZATION ADMIN: CPT

## 2022-04-21 PROCEDURE — 93005 ELECTROCARDIOGRAM TRACING: CPT

## 2022-04-21 PROCEDURE — 81001 URINALYSIS AUTO W/SCOPE: CPT

## 2022-04-21 PROCEDURE — 73090 X-RAY EXAM OF FOREARM: CPT

## 2022-04-21 PROCEDURE — G0378: CPT

## 2022-04-21 PROCEDURE — 87086 URINE CULTURE/COLONY COUNT: CPT

## 2022-04-21 PROCEDURE — 80053 COMPREHEN METABOLIC PANEL: CPT

## 2022-04-21 PROCEDURE — 36415 COLL VENOUS BLD VENIPUNCTURE: CPT

## 2022-04-21 PROCEDURE — 90715 TDAP VACCINE 7 YRS/> IM: CPT

## 2022-04-21 PROCEDURE — 99285 EMERGENCY DEPT VISIT HI MDM: CPT | Mod: 25

## 2022-04-21 PROCEDURE — 85025 COMPLETE CBC W/AUTO DIFF WBC: CPT

## 2022-04-21 PROCEDURE — 73110 X-RAY EXAM OF WRIST: CPT

## 2022-04-21 PROCEDURE — 99217: CPT | Mod: FS

## 2022-04-21 PROCEDURE — 73522 X-RAY EXAM HIPS BI 3-4 VIEWS: CPT

## 2022-04-21 RX ORDER — CEPHALEXIN 500 MG
500 CAPSULE ORAL EVERY 6 HOURS
Refills: 0 | Status: DISCONTINUED | OUTPATIENT
Start: 2022-04-21 | End: 2022-04-25

## 2022-04-21 RX ORDER — CEPHALEXIN 500 MG
1 CAPSULE ORAL
Qty: 28 | Refills: 0
Start: 2022-04-21 | End: 2022-04-27

## 2022-04-21 RX ADMIN — Medication 975 MILLIGRAM(S): at 07:07

## 2022-04-21 RX ADMIN — Medication 975 MILLIGRAM(S): at 02:55

## 2022-04-21 RX ADMIN — Medication 1 DROP(S): at 07:09

## 2022-04-21 RX ADMIN — ATORVASTATIN CALCIUM 20 MILLIGRAM(S): 80 TABLET, FILM COATED ORAL at 00:07

## 2022-04-21 RX ADMIN — Medication 500 MILLIGRAM(S): at 13:11

## 2022-04-21 RX ADMIN — Medication 10 MILLIGRAM(S): at 13:12

## 2022-04-21 RX ADMIN — Medication 81 MILLIGRAM(S): at 13:11

## 2022-04-21 RX ADMIN — CELECOXIB 200 MILLIGRAM(S): 200 CAPSULE ORAL at 13:12

## 2022-04-21 RX ADMIN — ERGOCALCIFEROL 50000 UNIT(S): 1.25 CAPSULE ORAL at 13:13

## 2022-04-21 RX ADMIN — Medication 25 MICROGRAM(S): at 07:07

## 2022-04-21 RX ADMIN — POLYETHYLENE GLYCOL 3350 17 GRAM(S): 17 POWDER, FOR SOLUTION ORAL at 07:07

## 2022-04-21 RX ADMIN — Medication 325 MILLIGRAM(S): at 13:12

## 2022-04-21 RX ADMIN — RISPERIDONE 0.5 MILLIGRAM(S): 4 TABLET ORAL at 13:14

## 2022-04-21 NOTE — ED CDU PROVIDER SUBSEQUENT DAY NOTE - NSICDXPASTMEDICALHX_GEN_ALL_CORE_FT
PAST MEDICAL HISTORY:  Arthritis     Degeneration macular     Dementia     [place] (hard of hearing)     HTN (hypertension)     Hypothyroidism     Osteoporosis     Poor historian

## 2022-04-21 NOTE — PHYSICAL THERAPY INITIAL EVALUATION ADULT - PHYSICAL ASSIST/NONPHYSICAL ASSIST: STAND/SIT, REHAB EVAL
Xeljanz Counseling: I discussed with the patient the risks of Xeljanz therapy including increased risk of infection, liver issues, headache, diarrhea, or cold symptoms. Live vaccines should be avoided. They were instructed to call if they have any problems. 1 person assist

## 2022-04-21 NOTE — PROVIDER CONTACT NOTE (OTHER) - ACTION/TREATMENT ORDERED:
PT Goals( to achieve in 2 weeks);Pt independent with bed mobility. Pt supervision with transfers.  Pt supervision with amb with RW X 200 feet

## 2022-04-21 NOTE — ED CDU PROVIDER DISPOSITION NOTE - PATIENT PORTAL LINK FT
You can access the FollowMyHealth Patient Portal offered by [facility] by registering at the following website: [email or web address]. By joining [employer]’s FollowMyHealth portal, you will also be able to view your health information using other applications (apps) compatible with our system.

## 2022-04-21 NOTE — ED ADULT NURSE REASSESSMENT NOTE - NS ED NURSE REASSESS COMMENT FT1
assumed care of pt from night RN; pt on 1:1 for safety a&ox1 baseline. per rn report pt refused AM PO meds. notified MD. will continue to monitor . assumed care of pt from night RN; pt on 1:1 for safety a&ox1 baseline. per rn report pt refused AM PO meds. notified MD. VSS offers no complaints resting at this time will continue to monitor .

## 2022-04-21 NOTE — ED CDU PROVIDER SUBSEQUENT DAY NOTE - ATTENDING CONTRIBUTION TO CARE
89 yo female [facility] dementia with falls  for evaluaton for safety and placement;

## 2022-04-21 NOTE — ED CDU PROVIDER SUBSEQUENT DAY NOTE - DIAGNOSIS COUNSELING, MDM
Conducted detailed discussion especially about needs for PT evaluation in light of recent falls. conducted a detailed discussion...

## 2022-04-21 NOTE — ED CDU PROVIDER DISPOSITION NOTE - CLINICAL COURSE
Pt is a 89y f w/ dementia discharged from rehab after hip surgery to [facility] sent in after 3 falls in the past 3 days. Pt denies any complaints but is unable to provide any history. Has skin tear to right forearm, otherwise no injuries noted. Pt placed in obs for PT and AARON.

## 2022-04-21 NOTE — ED CDU PROVIDER DISPOSITION NOTE - NS ED ATTENDING STATEMENT MOD
This was a shared visit with the MEETA. I reviewed and verified the documentation and independently performed the documented:

## 2022-04-21 NOTE — CHART NOTE - NSCHARTNOTEFT_GEN_A_CORE
ZAID Note: SW alerted by ED provider pt is from the clerk [place] attempted to arrange d/c back however facility called back to ED stating pt fell multiple times and is presently unsafe to return at this time. PT consult now pending for official dispo reccs, SW following
SOCIAL WORK NOTE:  AS PER FAMILY THEY HAVE PRIVATELY HIRED AID FOR PATIENT AND WOULD LIKE TO TRANSPORT BACK TO THE [facility] THEMSELVES.  SPOKE WITH [facility] AT THE [facility] # [phone] AND THEY CONFIRMED PLAN AND HAVE BEEN WORKING WITH THE FAMILY. THEY REPORTED THAT FAMILY WILL STAY AND SUPERVISE THE PATIENT THROUGH 9 PM WHEN AID WILL START.  ALL DC PAPERS FAXED TO THE [facility] AT FAX # [phone]. ED OBS TEAM MADE AWARE PLAN WAS CONFIRMED WITH [facility]IST[facility]IVING.

## 2022-04-21 NOTE — ED CDU PROVIDER DISPOSITION NOTE - NSFOLLOWUPINSTRUCTIONS_ED_ALL_ED_FT
Take antibiotic (keflex) every 6 hours x 7 days for UTI. started 4/21/22.   Come back with new or worsening symptoms.    Urinary Tract Infection    A urinary tract infection (UTI) is an infection of any part of the urinary tract, which includes the kidneys, ureters, bladder, and urethra. Risk factors include ignoring your need to urinate, wiping back to front if female, being an uncircumcised male, and having diabetes or a weak immune system. Symptoms include frequent urination, pain or burning with urination, foul smelling urine, cloudy urine, pain in the lower abdomen, blood in the urine, and fever. If you were prescribed an antibiotic medicine, take it as told by your health care provider. Do not stop taking the antibiotic even if you start to feel better.    SEEK IMMEDIATE MEDICAL CARE IF YOU HAVE ANY OF THE FOLLOWING SYMPTOMS: severe back or abdominal pain, fever, inability to keep fluids or medicine down, dizziness/lightheadedness, or a change in mental status.

## 2022-06-14 ENCOUNTER — EMERGENCY (EMERGENCY)
Facility: HOSPITAL | Age: 87
LOS: 1 days | Discharge: HOME | End: 2022-06-14
Attending: EMERGENCY MEDICINE
Payer: MEDICARE

## 2022-06-14 VITALS
DIASTOLIC BLOOD PRESSURE: 50 MMHG | HEART RATE: 92 BPM | TEMPERATURE: 98 F | RESPIRATION RATE: 18 BRPM | SYSTOLIC BLOOD PRESSURE: 119 MMHG | OXYGEN SATURATION: 99 % | HEIGHT: 57 IN

## 2022-06-14 LAB
ALBUMIN SERPL ELPH-MCNC: 3.3 G/DL — SIGNIFICANT CHANGE UP (ref 3.3–5.2)
ALP SERPL-CCNC: 78 U/L — SIGNIFICANT CHANGE UP (ref 40–120)
ALT FLD-CCNC: 11 U/L — SIGNIFICANT CHANGE UP
ANION GAP SERPL CALC-SCNC: 14 MMOL/L — SIGNIFICANT CHANGE UP (ref 5–17)
APTT BLD: 26.4 SEC — LOW (ref 27.5–35.5)
AST SERPL-CCNC: 12 U/L — SIGNIFICANT CHANGE UP
BASOPHILS # BLD AUTO: 0.04 K/UL — SIGNIFICANT CHANGE UP (ref 0–0.2)
BASOPHILS NFR BLD AUTO: 0.4 % — SIGNIFICANT CHANGE UP (ref 0–2)
BILIRUB SERPL-MCNC: 0.6 MG/DL — SIGNIFICANT CHANGE UP (ref 0.4–2)
BUN SERPL-MCNC: 30.4 MG/DL — HIGH (ref 8–20)
CALCIUM SERPL-MCNC: 8.4 MG/DL — LOW (ref 8.6–10.2)
CHLORIDE SERPL-SCNC: 107 MMOL/L — SIGNIFICANT CHANGE UP (ref 98–107)
CO2 SERPL-SCNC: 22 MMOL/L — SIGNIFICANT CHANGE UP (ref 22–29)
CREAT SERPL-MCNC: 1.22 MG/DL — SIGNIFICANT CHANGE UP (ref 0.5–1.3)
EGFR: 41 ML/MIN/1.73M2 — LOW
EOSINOPHIL # BLD AUTO: 0.53 K/UL — HIGH (ref 0–0.5)
EOSINOPHIL NFR BLD AUTO: 5.1 % — SIGNIFICANT CHANGE UP (ref 0–6)
GLUCOSE SERPL-MCNC: 111 MG/DL — HIGH (ref 70–99)
HCT VFR BLD CALC: 30.8 % — LOW (ref 34.5–45)
HGB BLD-MCNC: 10.1 G/DL — LOW (ref 11.5–15.5)
IMM GRANULOCYTES NFR BLD AUTO: 0.7 % — SIGNIFICANT CHANGE UP (ref 0–1.5)
INR BLD: 1.15 RATIO — SIGNIFICANT CHANGE UP (ref 0.88–1.16)
LYMPHOCYTES # BLD AUTO: 1.1 K/UL — SIGNIFICANT CHANGE UP (ref 1–3.3)
LYMPHOCYTES # BLD AUTO: 10.6 % — LOW (ref 13–44)
MCHC RBC-ENTMCNC: 30.4 PG — SIGNIFICANT CHANGE UP (ref 27–34)
MCHC RBC-ENTMCNC: 32.8 GM/DL — SIGNIFICANT CHANGE UP (ref 32–36)
MCV RBC AUTO: 92.8 FL — SIGNIFICANT CHANGE UP (ref 80–100)
MONOCYTES # BLD AUTO: 1.02 K/UL — HIGH (ref 0–0.9)
MONOCYTES NFR BLD AUTO: 9.9 % — SIGNIFICANT CHANGE UP (ref 2–14)
NEUTROPHILS # BLD AUTO: 7.58 K/UL — HIGH (ref 1.8–7.4)
NEUTROPHILS NFR BLD AUTO: 73.3 % — SIGNIFICANT CHANGE UP (ref 43–77)
PLATELET # BLD AUTO: 288 K/UL — SIGNIFICANT CHANGE UP (ref 150–400)
POTASSIUM SERPL-MCNC: 4.8 MMOL/L — SIGNIFICANT CHANGE UP (ref 3.5–5.3)
POTASSIUM SERPL-SCNC: 4.8 MMOL/L — SIGNIFICANT CHANGE UP (ref 3.5–5.3)
PROT SERPL-MCNC: 5.9 G/DL — LOW (ref 6.6–8.7)
PROTHROM AB SERPL-ACNC: 13.4 SEC — SIGNIFICANT CHANGE UP (ref 10.5–13.4)
RBC # BLD: 3.32 M/UL — LOW (ref 3.8–5.2)
RBC # FLD: 14.6 % — HIGH (ref 10.3–14.5)
SARS-COV-2 RNA SPEC QL NAA+PROBE: SIGNIFICANT CHANGE UP
SODIUM SERPL-SCNC: 143 MMOL/L — SIGNIFICANT CHANGE UP (ref 135–145)
WBC # BLD: 10.34 K/UL — SIGNIFICANT CHANGE UP (ref 3.8–10.5)
WBC # FLD AUTO: 10.34 K/UL — SIGNIFICANT CHANGE UP (ref 3.8–10.5)

## 2022-06-14 PROCEDURE — 99284 EMERGENCY DEPT VISIT MOD MDM: CPT | Mod: 25

## 2022-06-14 PROCEDURE — 72125 CT NECK SPINE W/O DYE: CPT | Mod: 26,MA

## 2022-06-14 PROCEDURE — 73030 X-RAY EXAM OF SHOULDER: CPT | Mod: 26,RT

## 2022-06-14 PROCEDURE — 27197 CLSD TX PELVIC RING FX: CPT

## 2022-06-14 PROCEDURE — 72192 CT PELVIS W/O DYE: CPT | Mod: 26,MA

## 2022-06-14 PROCEDURE — 73502 X-RAY EXAM HIP UNI 2-3 VIEWS: CPT | Mod: 26,RT

## 2022-06-14 PROCEDURE — 70450 CT HEAD/BRAIN W/O DYE: CPT | Mod: 26,MA

## 2022-06-14 PROCEDURE — 99284 EMERGENCY DEPT VISIT MOD MDM: CPT

## 2022-06-14 PROCEDURE — 99220: CPT | Mod: FS

## 2022-06-14 RX ORDER — LEVOTHYROXINE SODIUM 125 MCG
25 TABLET ORAL DAILY
Refills: 0 | Status: DISCONTINUED | OUTPATIENT
Start: 2022-06-14 | End: 2022-06-18

## 2022-06-14 RX ORDER — RISPERIDONE 4 MG/1
0.5 TABLET ORAL DAILY
Refills: 0 | Status: DISCONTINUED | OUTPATIENT
Start: 2022-06-14 | End: 2022-06-18

## 2022-06-14 RX ORDER — ATORVASTATIN CALCIUM 80 MG/1
20 TABLET, FILM COATED ORAL AT BEDTIME
Refills: 0 | Status: DISCONTINUED | OUTPATIENT
Start: 2022-06-14 | End: 2022-06-18

## 2022-06-14 RX ORDER — LANOLIN ALCOHOL/MO/W.PET/CERES
3 CREAM (GRAM) TOPICAL AT BEDTIME
Refills: 0 | Status: DISCONTINUED | OUTPATIENT
Start: 2022-06-14 | End: 2022-06-18

## 2022-06-14 RX ORDER — SODIUM CHLORIDE 9 MG/ML
1000 INJECTION, SOLUTION INTRAVENOUS ONCE
Refills: 0 | Status: COMPLETED | OUTPATIENT
Start: 2022-06-14 | End: 2022-06-14

## 2022-06-14 RX ORDER — ACETAMINOPHEN 500 MG
650 TABLET ORAL EVERY 8 HOURS
Refills: 0 | Status: DISCONTINUED | OUTPATIENT
Start: 2022-06-14 | End: 2022-06-18

## 2022-06-14 RX ORDER — ASPIRIN/CALCIUM CARB/MAGNESIUM 324 MG
81 TABLET ORAL DAILY
Refills: 0 | Status: DISCONTINUED | OUTPATIENT
Start: 2022-06-14 | End: 2022-06-18

## 2022-06-14 RX ORDER — QUETIAPINE FUMARATE 200 MG/1
12.5 TABLET, FILM COATED ORAL EVERY 8 HOURS
Refills: 0 | Status: DISCONTINUED | OUTPATIENT
Start: 2022-06-14 | End: 2022-06-18

## 2022-06-14 RX ORDER — ACETAMINOPHEN 500 MG
650 TABLET ORAL ONCE
Refills: 0 | Status: COMPLETED | OUTPATIENT
Start: 2022-06-14 | End: 2022-06-14

## 2022-06-14 RX ADMIN — SODIUM CHLORIDE 1000 MILLILITER(S): 9 INJECTION, SOLUTION INTRAVENOUS at 20:22

## 2022-06-14 NOTE — CONSULT NOTE ADULT - NS ATTEND AMEND GEN_ALL_CORE FT
Orthopaedic Trauma Surgeon Addendum:    I have personally performed a face-to-face diagnostic evaluation on this patient.  I have reviewed the physician assistant note and agree with the history, exam, and plan of care, except as noted.    Osteopenia and osteoporosis are significant risk factors for fragility fractures. Given the type of fracture that has occurred, I would highly recommend that the patient followup with their primary care physician to discuss treatment for low bone mineral density. We discussed the benefits of these medications frequently far outweigh the small risks. Their primary care physician can call the office with any specific questions or concerns.    No orthopedic trauma intervention is required but we will help facilitate future care as needed. The patient may follow up as needed.    Armand Martino MD  Orthopaedic Trauma Surgeon  [facility] [place]

## 2022-06-14 NOTE — ED PROVIDER NOTE - NS ED ROS FT
Constitutional: no fever, no chills  Head: NC, AT   Eyes: no redness   ENMT: no nasal congestion/drainage, no sore throat   CV: no chest pain, no edema  Resp: no cough, no dyspnea  GI: no abdominal pain, no nausea, no vomiting, no diarrhea  : no dysuria, no hematuria   Skin: no lesions, no rashes   Neuro: no LOC, no headache, no sensory deficits, no weakness  msk: right leg pain

## 2022-06-14 NOTE — CONSULT NOTE ADULT - SUBJECTIVE AND OBJECTIVE BOX
Pt Name: DANISH MICHAELS    MRN: [id number]      Patient is a 89y Female PMHx of dementia presenting for fall three days ago. Patient has 24 hours aid and was going to the bathroom and attempted to stand while the aid was getting toilet paper. The patient fell back, the aid tried to break her fall with her foot. Pt did not hit her head or have LOC. Since then she has been complaining of right hip pain when walking. Otherwise denies cp, sob, ab pain, change in strength or sensation, headache, dysuria.    REVIEW OF SYSTEMS    General: Alert, responsive, in NAD    Respiratory and Thorax: No difficulty breathing. No cough.  	   Cardiovascular:	No chest pain. No palpitations.    Genitourinary: No dysuria. No bleeding.    Musculoskeletal: SEE HPI.    Neurological: No sensory or motor changes.     Endocrine: No Hx of diabetes.    ROS is otherwise negative.      PAST MEDICAL & SURGICAL HISTORY:  PAST MEDICAL & SURGICAL HISTORY:  HTN (hypertension)      Dementia    Osteoporosis    Arthritis    Hypothyroidism    Degeneration macular    Poor historian    [place] (hard of hearing)    No significant past surgical history        Allergies: No Known Allergies      Medications:     FAMILY HISTORY:  No pertinent family history in first degree relatives  : non-contributory    Social History:   Ambulation: Walking independently With Walker        Vital Signs Last 24 Hrs  T(C): 36.7 (14 Jun 2022 10:55), Max: 36.7 (14 Jun 2022 10:55)  T(F): 98.1 (14 Jun 2022 10:55), Max: 98.1 (14 Jun 2022 10:55)  HR: 92 (14 Jun 2022 10:55) (92 - 92)  BP: 119/50 (14 Jun 2022 10:55) (119/50 - 119/50)  BP(mean): --  RR: 18 (14 Jun 2022 10:55) (18 - 18)  SpO2: 99% (14 Jun 2022 10:55) (99% - 99%)    Daily Height in cm: 144.78 (14 Jun 2022 10:55)    Daily       PHYSICAL EXAM:      Appearance: Alert, responsive, in no acute distress.    Neurological: Sensation is grossly intact to light touch. No focal deficits or weaknesses found.    Skin: Skin is clean, dry and intact. No tenting. No bleeding. No abrasions. No ulcerations.    Vascular: 2+ DP. Cap refill < 2 sec. No signs of venous insufficiency or stasis. No extremity ulcerations. No cyanosis.    Musculoskeletal:         Left Upper Extremity: Spontaneously moving. Active ROM of digits, wrist flexion extension, elbow flexion extension, shoulder abduction adduction without pain elicited. . Clavicle nontender to palpation. No bony tenderness. Compartments soft, compressible, nontender. Denies Numbness, tingling, other orthopedic complaints.       Right Upper Extremity: Spontaneously moving. Active ROM of digits, wrist flexion extension, elbow flexion extension, shoulder abduction adduction without pain elicited. Limited shoulder extension as per baseline shoulder degeneration. Clavicle nontender to palpation. No bony tenderness. Compartments soft, compressible, nontender. Denies Numbness, tingling, other orthopedic complaints.       Left Lower Extremity:+ SLR. + knee Flexion. + dorsi plantar flexion. EHL, FHL intact. Active FROM without elicited pain. No bony tenderness. Compartments Soft, compressible, nontender. Denies Numbness, tingling, other orthopedic complaints.       Right Lower Extremity: Lmited SLR due to pain. Limited due to knee Flexion due to pain. + dorsi plantar flexion. EHL, FHL intact. tender to palpation over Left hip region. Sacroiliac compression nontender. Compartments Soft, compressible, nontender. Denies Numbness, tingling, other orthopedic complaints.      Imaging Studies:  < from: CT Pelvis Bony Only No Cont (06.14.22 @ 14:05) >  ACC: [id number] EXAM:  CT PELVIS BONY ONLY                          PROCEDURE DATE:  06/14/2022          INTERPRETATION:  INDICATION: Fall    TECHNIQUE: CT of the pelvis without contrast with axial, sagittal, and   coronal multiplanar reformats.    Comparison:Comparison pelvic CT dated 2/3/2022; comparison hip   radiographs dated 6/14/2022    FINDINGS:    Bones are diffusely demineralized.    Acute, comminuted, displaced fracture of the right superior pubic ramus   with extension into the right pubic body. Acute, comminuted minimally   displaced fracture of the right inferior pubic ramus. Acute, comminuted   nondisplaced fracture of the right ischial tuberosity/right ischium.    Sclerosis within bilateral sacral ala at the level of S2-S3, which may   represent age-indeterminate nondisplaced sacral insufficiency fractures.    Old fracture deformities of the left superior pubic ramus, left pubic   body, left inferior pubic ramus    Internally fixated left femoral neck fracture with cement seen within the   left femoral head/neck and extensive heterotopic ossification/nonbridging   callus formation surrounding the fracture site. Minimal bridging callus   formation along the lateral aspect of the fracture site. Fracture margins   appear somewhat well corticated. 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    Mild bilateral hip arthrosis with areas of chondrocalcinosis.    Degenerative changes within the lower lumbar spine, sacroiliac joints,   and pubic symphysis.    Hematoma surrounding the fracture sites, some of which effaces the fat   plane with the right anterolateral aspect of the bladder. There is a   small amount of layering debris within the bladder lumen. Bladder is   distended.    Colonic diverticulosis. Atherosclerotic calcifications are noted.    IMPRESSION:    Acute fractures of the right superior pubic ramus, right pubic body,   right inferior pubic ramus, and right ischium/ischial tuberosity, as   described above.    Hematoma surrounding the fracture sites, some of which effaces the fat   plane with the right anterolateral aspect of the bladder. If there is   blood within the urine on urinalysis, CT cystogram should be performed to   rule out bladder injury. Distended bladder with layering debris within   the dependent portion of the bladder.    Internal fixated left femoral neck fracture with extensive heterotopic   ossification/nonbridging callus formation at the fracture site. 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    Age-indeterminate nondisplaced bilateral sacral alar insufficiency   fractures, new from prior CT.    Other findings as above.    --- End of Report---      MAURY TOBAR M.D., ATTENDING RADIOLOGIST  This document has been electronically signed. Jun 14 2022  2:34PM      < from: Xray Hip w/ Pelvis 2 or 3 Views, Right (06.14.22 @ 13:04) >    ACC: [id number] EXAM:  XR HIP WITH PELV 2-3V RT                        ACC: [id number] EXAM:  XR SHOULDER COMP MIN 2V RT                          PROCEDURE DATE:  06/14/2022          INTERPRETATION:  Right shoulder and right hip with pelvis. Patient fell   with local trauma.    Right hip with pelvis. 3 views.    There is a left hip pinning with bone glue in the cervical area. There   are progressive signs of healing compared to April 20. Basic alignment   unchanged.    Old fracture deformities around the left ischio pubic ramus and left   pubic symphysis and also the medial right pubic ramus again noted.    There is diffuse rather mild right hip degeneration. No bone destruction.    The present study shows acute fractures involving the right mid superior   pubic ramus and the right ischio pubic ramus.    Right shoulder. 2 views.    Mild to moderate degeneration around articular glenoid.    Old right rib fracture.    No acute fracture.    IMPRESSION: There are acute fractures of the right superior pubic ramus   and the right issue pubic ramus. Right shoulder degeneration. Other   findings stable.    --- End of Report ---      AL JOSEPH MD; Attending Radiologist  This document has been electronically signed. Jun 14 2022  2:06PM     A/P: 89y.o Female with Right superior pubic ramus fracture    * D/w Dr. Martino  * Pain control  * WBAT with Walker  * Physical Therapy  * Ortho stable

## 2022-06-14 NOTE — ED CDU PROVIDER INITIAL DAY NOTE - ATTENDING APP SHARED VISIT CONTRIBUTION OF CARE
pt with pubic fracture  pending sw and pt eval  multiple medical issues  acceptinto obs on  possible AARON protocol

## 2022-06-14 NOTE — ED ADULT NURSE NOTE - NSICDXPASTMEDICALHX_GEN_ALL_CORE_FT
PAST MEDICAL HISTORY:  Arthritis     Degeneration macular     Dementia     [place] (hard of hearing)     HTN (hypertension)     Hypothyroidism     Osteoporosis     Poor historian

## 2022-06-14 NOTE — ED PROVIDER NOTE - NSICDXPASTMEDICALHX_GEN_ALL_CORE_FT
PAST MEDICAL HISTORY:  Arthritis     Degeneration macular     Dementia     [place] (hard of hearing)     HTN (hypertension)     Hypothyroidism     Osteoporosis     Poor historian

## 2022-06-14 NOTE — ED CDU PROVIDER INITIAL DAY NOTE - NSICDXPASTMEDICALHX_GEN_ALL_CORE_FT
PAST MEDICAL HISTORY:  Arthritis     Degeneration macular     Dementia     [place] (hard of hearing)     HTN (hypertension)     Hypothyroidism     Osteoporosis     Poor historian

## 2022-06-14 NOTE — ED ADULT NURSE NOTE - NSIMPLEMENTINTERV_GEN_ALL_ED
Implemented All Fall with Harm Risk Interventions:  [place] to call system. Call bell, personal items and telephone within reach. Instruct patient to call for assistance. Room bathroom lighting operational. Non-slip footwear when patient is off stretcher. Physically safe environment: no spills, clutter or unnecessary equipment. Stretcher in lowest position, wheels locked, appropriate side rails in place. Provide visual cue, wrist band, yellow gown, etc. Monitor gait and stability. Monitor for mental status changes and reorient to person, place, and time. Review medications for side effects contributing to fall risk. Reinforce activity limits and safety measures with patient and family. Provide visual clues: red socks.

## 2022-06-14 NOTE — ED PROVIDER NOTE - PHYSICAL EXAMINATION
General: well appearing, NAD  Head: NC, AT  EENT: EOMI, no scleral icterus  Cardiac: RRR, no apparent murmurs, no lower extremity edema  Respiratory: CTABL, no respiratory distress   Abdomen: soft, ND, NT, nonperitonitic  MSK/Vascular: full ROM, soft compartments, warm extremities, Right hip tenderness with bruise/hematoma to right lateral hip. Otherwise no LE tenderness or deformity.  Neuro: AAOx1, sensation to light touch intact, 5/5 strength x4  Psych: calm, cooperative

## 2022-06-14 NOTE — ED PROVIDER NOTE - OBJECTIVE STATEMENT
89 y f with pmh of dementia presenting for fall three days ago. Patient has 24 hours aid and was going to the bathroom and attempted to stand while the aid was getting toilet paper. The patient fell back, the aid tried to break her fall with her foot. Pt did not hit her head or have LOC. Since then she has been complaining of right hip pain when walking. Otherwise denies cp, sob, ab pain, change in strength or sensation, headache, dysuria.

## 2022-06-14 NOTE — ED ADULT NURSE NOTE - CHIEF COMPLAINT QUOTE
pt a+ox3, BIBA from Nursing Home s/p witnessed fall. pt reports daily ASA, denies daily thinners. pt c/o right heel pain, denies hitting head or LOC.

## 2022-06-14 NOTE — ED PROVIDER NOTE - ATTENDING CONTRIBUTION TO CARE
I, Neeraj Pichardo, personally saw the patient with the resident, and completed the key components of the history and physical exam. I then discussed the management plan with the resident.    89 yo F hx of dementia p/w right hip pain s/p mechanical fall 3 days prior when she got up from the bathroom. no head injury. patient normally able to ambulate with walker but now with more difficulty. Diffuse brusing of right hip, full passive ROM but painful. xray and CT showed multiple right pelvic fracture. patient seen by ortho, nor surgical intervention. patient placed in Obs for  PT eval.

## 2022-06-15 VITALS
HEART RATE: 90 BPM | RESPIRATION RATE: 18 BRPM | OXYGEN SATURATION: 93 % | DIASTOLIC BLOOD PRESSURE: 65 MMHG | TEMPERATURE: 99 F | SYSTOLIC BLOOD PRESSURE: 114 MMHG

## 2022-06-15 LAB
APPEARANCE UR: ABNORMAL
BACTERIA # UR AUTO: ABNORMAL
BILIRUB UR-MCNC: NEGATIVE — SIGNIFICANT CHANGE UP
COLOR SPEC: YELLOW — SIGNIFICANT CHANGE UP
DIFF PNL FLD: ABNORMAL
EPI CELLS # UR: SIGNIFICANT CHANGE UP
GLUCOSE UR QL: NEGATIVE — SIGNIFICANT CHANGE UP
KETONES UR-MCNC: NEGATIVE — SIGNIFICANT CHANGE UP
LEUKOCYTE ESTERASE UR-ACNC: ABNORMAL
NITRITE UR-MCNC: POSITIVE
PH UR: 7 — SIGNIFICANT CHANGE UP (ref 5–8)
PROT UR-MCNC: 30 MG/DL
RBC CASTS # UR COMP ASSIST: SIGNIFICANT CHANGE UP /HPF (ref 0–4)
SP GR SPEC: 1 — LOW (ref 1.01–1.02)
UROBILINOGEN FLD QL: 1
WBC UR QL: >50 /HPF (ref 0–5)

## 2022-06-15 PROCEDURE — 85730 THROMBOPLASTIN TIME PARTIAL: CPT

## 2022-06-15 PROCEDURE — 70450 CT HEAD/BRAIN W/O DYE: CPT | Mod: MA

## 2022-06-15 PROCEDURE — 81001 URINALYSIS AUTO W/SCOPE: CPT

## 2022-06-15 PROCEDURE — G0378: CPT

## 2022-06-15 PROCEDURE — U0003: CPT

## 2022-06-15 PROCEDURE — 85025 COMPLETE CBC W/AUTO DIFF WBC: CPT

## 2022-06-15 PROCEDURE — 87186 SC STD MICRODIL/AGAR DIL: CPT

## 2022-06-15 PROCEDURE — 85610 PROTHROMBIN TIME: CPT

## 2022-06-15 PROCEDURE — 73502 X-RAY EXAM HIP UNI 2-3 VIEWS: CPT

## 2022-06-15 PROCEDURE — 99284 EMERGENCY DEPT VISIT MOD MDM: CPT | Mod: 25

## 2022-06-15 PROCEDURE — 80053 COMPREHEN METABOLIC PANEL: CPT

## 2022-06-15 PROCEDURE — 99217: CPT

## 2022-06-15 PROCEDURE — 96374 THER/PROPH/DIAG INJ IV PUSH: CPT

## 2022-06-15 PROCEDURE — 36415 COLL VENOUS BLD VENIPUNCTURE: CPT

## 2022-06-15 PROCEDURE — U0005: CPT

## 2022-06-15 PROCEDURE — 87086 URINE CULTURE/COLONY COUNT: CPT

## 2022-06-15 PROCEDURE — 72125 CT NECK SPINE W/O DYE: CPT | Mod: MA

## 2022-06-15 PROCEDURE — 72192 CT PELVIS W/O DYE: CPT | Mod: MA

## 2022-06-15 PROCEDURE — 73030 X-RAY EXAM OF SHOULDER: CPT

## 2022-06-15 RX ORDER — LEVOTHYROXINE SODIUM 125 MCG
25 TABLET ORAL ONCE
Refills: 0 | Status: COMPLETED | OUTPATIENT
Start: 2022-06-15 | End: 2022-06-15

## 2022-06-15 RX ORDER — CEFTRIAXONE 500 MG/1
1000 INJECTION, POWDER, FOR SOLUTION INTRAMUSCULAR; INTRAVENOUS ONCE
Refills: 0 | Status: COMPLETED | OUTPATIENT
Start: 2022-06-15 | End: 2022-06-15

## 2022-06-15 RX ORDER — CEPHALEXIN 500 MG
1 CAPSULE ORAL
Qty: 21 | Refills: 0
Start: 2022-06-15 | End: 2022-06-21

## 2022-06-15 RX ADMIN — CEFTRIAXONE 100 MILLIGRAM(S): 500 INJECTION, POWDER, FOR SOLUTION INTRAMUSCULAR; INTRAVENOUS at 04:51

## 2022-06-15 RX ADMIN — Medication 25 MICROGRAM(S): at 10:11

## 2022-06-15 RX ADMIN — Medication 650 MILLIGRAM(S): at 11:06

## 2022-06-15 RX ADMIN — Medication 650 MILLIGRAM(S): at 10:11

## 2022-06-15 NOTE — ED CDU PROVIDER DISPOSITION NOTE - CLINICAL COURSE
89 y f with pmh of dementia presenting for fall three days ago. Patient has 24 hours aid and was going to the bathroom and attempted to stand while the aid was getting toilet paper. The patient fell back, the aid tried to break her fall with her foot. Pt did not hit her head or have LOC. Since then she has been complaining of right hip pain when walking. Ct With Right sup pubic rami fracture with UTI . pt seen by PT and she has 24 hours from [place]istle can goe back seen the daughter at the bed side will dc.  f.u ortho and will tx the UTI

## 2022-06-15 NOTE — ED ADULT NURSE REASSESSMENT NOTE - NS ED NURSE REASSESS COMMENT FT1
patient changed, repositioned, diaper applied. aide at bedside stated that purewick was agitating patient. purewick continues to remain off at this time and patient changed prn. will endorse to oncoming shift.
patient sleeping in NAD. aide at bedside
patient with + UTI. abx administered as ordered. patient changed, cleaned, turned, repositioned. NOHEMY Vance at the bedside. patient with area of ecchymosis on the R side of mons pubis. purewick removed for patient comfort. will reassess and change patient as needed.
patient cleaned, repositioned, new purewick applied, boosted at 2145. bruising noted to R hip. patient spit out 2200 medications. NOHEMY Choudhary aware. aide from home at bedside. purewick in place.
Assumed care of the patient @8333. Pt A&O to person. VSS afebrile. Awaiting on placement. Pt's niece at bedside.  Patient in understanding of plan of care. Patient with no further questions for the RN. Resting in comfort. Call bell within reach and encouraged to use when assistance needed. Will continue to monitor. Safety maintained.
care assumed from LIANE Lee. aide from home at bedside. patient denies complaints at present. purewick in place. AAO x 1-2. IVF running as ordered. VSS. will administer meds when due. + pedal pulses.

## 2022-06-15 NOTE — ED CDU PROVIDER DISPOSITION NOTE - NSFOLLOWUPINSTRUCTIONS_ED_ALL_ED_FT
please continue daily medication   start taking keflex tab( crush and mix in juice ) every 8 hours for the UTI x 7 days   pubic rami fracture bear weight as tolerated on the right leg   precaution of fall use the walker to walk   call and follow up with primary care within 1-2 days and follow up with ORTHOPEDIC AS WELL   Tylenol over the counter as need It for the pain - alternative Motrin and follow instruction   Urinary Tract Infection    A urinary tract infection (UTI) is an infection of any part of the urinary tract, which includes the kidneys, ureters, bladder, and urethra. Risk factors include ignoring your need to urinate, wiping back to front if female, being an uncircumcised male, and having diabetes or a weak immune system. Symptoms include frequent urination, pain or burning with urination, foul smelling urine, cloudy urine, pain in the lower abdomen, blood in the urine, and fever. If you were prescribed an antibiotic medicine, take it as told by your health care provider. Do not stop taking the antibiotic even if you start to feel better.    SEEK IMMEDIATE MEDICAL CARE IF YOU HAVE ANY OF THE FOLLOWING SYMPTOMS: severe back or abdominal pain, fever, inability to keep fluids or medicine down, dizziness/lightheadedness, or a change in mental status.    Simple Pelvic Fracture, Adult       A pelvic fracture is a break in one of the bones in the pelvis. The pelvic bones include the bones that you sit on and the bones that make up the lower part of your spine. A pelvic fracture is called simple if:  •There is only one break.      •The broken bone is stable.      •The broken bone is not moving out of place.      •The bone does not devries the skin.      A pelvic fracture may occur along with injuries to nerves, blood vessels, soft tissues, the urinary tract, and abdominal organs.      What are the causes?    Common causes of this type of fracture include:  •A fall.      •A car accident.      •Force or pressure that hits the pelvis.        What increases the risk?    You are more likely to get this injury if you:  •Play high-impact sports, such as rugby or football.      •You have thinning or weakening of your bones, such as from osteopenia or osteoporosis.      •Have cancer that has spread to the bone.      •Have a condition that is associated with falling, such as Parkinson's disease or seizure.      •Have had a stroke.      •Smoke.        What are the signs or symptoms?    Signs and symptoms may include:  •Tenderness, swelling, or bruising in the affected area.      •Pain when moving the hip.      •Pain when walking or standing.        How is this diagnosed?    This condition is diagnosed with a physical exam, X-ray, or CT scan. You may also have blood or urine tests:  •To rule out damage to other organs, such as the urethra.      •To check for internal bleeding in the pelvic area.        How is this treated?    The goal of treatment is to get the bone to heal in its original position. Treatment includes:  •Staying in bed (bed rest).       •Using crutches, a walker, or a wheelchair until the bone heals.      •Medicines to treat pain.      •Medicines to prevent blood clots from forming in your legs.      •Physical therapy.        Follow these instructions at home:    Medicines     •Take over-the-counter and prescription medicines only as told by your health care provider.      • Do not drive or use heavy machinery while taking prescription pain medicine.        Managing pain, stiffness, and swelling    •If directed, apply ice to the injured area:  •Put ice in a plastic bag.      •Place a towel between your skin and the bag.      •Leave the ice on for 20 minutes, 2–3 times a day.        •Gently move your toes often to avoid stiffness and to lessen swelling.      Activity     •Stay on bed rest for as long as directed by your health care provider.    •While on bed rest:  •Change the position of your legs every 1–2 hours. This keeps blood moving well through both of your legs.      •You may sit for as long as you feel comfortable.      •After bed rest:  •Avoid strenuous activities for as long as directed by your health care provider.      •Return to your normal activities as directed by your health care provider. Ask your health care provider what activities are safe for you.      •Use items to help you with your activities, such as:  •A long-handled shoehorn to help you put your shoes on.      •Elastic shoelaces that do not need to be retied.      •A reacher or grabber to pick items up off the floor.          General instructions      • Do not  drive or operate heavy machinery until your health care provider tells you it is safe to do so.      •Use a wheelchair or assistive devices as directed by your health care provider. When you are ready to walk, start by using crutches or a walker to help support your body weight.      •Have someone help you at home as you recover.      •Wear compression stockings as told by your health care provider.      • Do not use any products that contain nicotine or tobacco, such as cigarettes and e-cigarettes. These can delay bone healing. If you need help quitting, ask your health care provider.      •If you have an underlying condition that caused your pelvic fracture, work with your health care provider to manage your condition.      •Keep all follow-up visits as told by your health care provider. This is important.        Contact a health care provider if:    •Your pain gets worse.      •Your pain is not relieved with medicines.        Get help right away if you:    •Feel light-headed or faint.      •Develop chest pain.      •Develop shortness of breath.      •Have a fever.      •Have blood in your urine or your stools.      •Have bleeding in your vagina.      •Have difficulty or pain with urination or with passing stool.      •Have difficulty or increased pain with walking.      •Have new or increased swelling in one of your legs.      •Have numbness in your legs or groin area.        Summary    •A pelvic fracture is a break in one of the bones in the pelvis. These are the bones that you sit on and the bones that make up the lower part of your spine.      •A pelvic fracture is called simple if there is only one break, the broken bone is stable, the broken bone is not moving out of place, or the bone does not devries the skin.      •Common causes of this type of fracture include a fall, a car accident, or a force or pressure that hits the pelvis.      •The goal of treatment is to get the bone to heal in its original position.      •Treatment includes bed rest and using a wheelchair. When ready to walk, you may use crutches or a walker until your bone heals. Other treatments include physical therapy and medicine to treat pain and prevent blood clots.      This information is not intended to replace advice given to you by your health care provider. Make sure you discuss any questions you have with your health care provider.

## 2022-06-15 NOTE — PHYSICAL THERAPY INITIAL EVALUATION ADULT - ADDITIONAL COMMENTS
HCP Niece present.  Pt is a poor historian.  As per niece, pt lives in an half-way with 24/7 private hire aides. PTA, amb short distances with RW and assist.  Has assist with all ADLs and self care.

## 2022-06-15 NOTE — ED CDU PROVIDER SUBSEQUENT DAY NOTE - NSICDXPASTMEDICALHX_GEN_ALL_CORE_FT
PAST MEDICAL HISTORY:  Arthritis     Degeneration macular     Dementia     [place] (hard of hearing)     HTN (hypertension)     Hypothyroidism     Osteoporosis     Poor historian

## 2022-06-15 NOTE — PROVIDER CONTACT NOTE (OTHER) - ASSESSMENT
Pt with 0/10 pain before, 8/10 R Hip pain during movement and 0/10 after RX.  Pt will benefit from PT to maximize functional independence.  Will continue to follow.  Pt left supine in bed in no apparent distress and call bell within reach. Nurse aware

## 2022-06-15 NOTE — PHYSICAL THERAPY INITIAL EVALUATION ADULT - RANGE OF MOTION EXAMINATION, REHAB EVAL
R LE limited by pain/bilateral upper extremity ROM was WFL (within functional limits)/bilateral lower extremity ROM was WFL (within functional limits)

## 2022-06-15 NOTE — ED ADULT NURSE REASSESSMENT NOTE - COMFORT CARE
plan of care explained/repositioned
plan of care explained/repositioned/side rails up
meal provided/plan of care explained/po fluids offered/repositioned/warm blanket provided
meal provided/plan of care explained/repositioned/side rails up

## 2022-06-15 NOTE — ED CDU PROVIDER DISPOSITION NOTE - CARE PROVIDER_API CALL
Armand Martino)  Orthopaedic Surgery  [address] [place] [address]  Phone: [phone]  Fax: [phone]  Follow Up Time:

## 2022-06-15 NOTE — CHART NOTE - NSCHARTNOTEFT_GEN_A_CORE
SW Note: Pt cleared for discharge back to [facility], the [facility]. Met with Dr. hobbs. [facility] return form completed and new med sent to pts pharmacy , [facility] [phone].  Spoke with Pat from the [facility] [phone]. Confirmed form was received and pt can return. [facility]. [facility] letter sent with pt. Pts RN Roxi mo. Spoke with CCC Concetta Machuca and she will make referral to pts [facility] to resume services.

## 2022-06-15 NOTE — PROVIDER CONTACT NOTE (OTHER) - ACTION/TREATMENT ORDERED:
PT Goals( to achieve in 2 weeks);Pt min. assist with bed mobility. Pt min. assist with transfers.  Pt min. assist with amb with RW X 10 feet

## 2022-06-15 NOTE — ED CDU PROVIDER DISPOSITION NOTE - PATIENT PORTAL LINK FT
You can access the FollowMyHealth Patient Portal offered by [facility] by registering at the following website: [email or web address]. By joining [employer]’s FollowMyHealth portal, you will also be able to view your health information using other applications (apps) compatible with our system.

## 2022-06-15 NOTE — ED ADULT NURSE REASSESSMENT NOTE - GENERAL PATIENT STATE
comfortable appearance/cooperative
comfortable appearance/cooperative/resting/sleeping
comfortable appearance/cooperative
comfortable appearance
comfortable appearance
comfortable appearance/cooperative
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
## 2022-08-03 NOTE — ED ADULT NURSE NOTE - BREATH SOUNDS, LEFT
How Severe Are Your Spot(S)?: mild What Type Of Note Output Would You Prefer (Optional)?: Bullet Format What Is The Reason For Today's Visit?: Full Body Skin Examination What Is The Reason For Today's Visit? (Being Monitored For X): the development of a new lesion clear

## 2022-12-20 ENCOUNTER — EMERGENCY (EMERGENCY)
Facility: HOSPITAL | Age: 87
LOS: 0 days | Discharge: HOME | End: 2022-12-21
Attending: STUDENT IN AN ORGANIZED HEALTH CARE EDUCATION/TRAINING PROGRAM
Payer: MEDICARE

## 2022-12-20 VITALS
SYSTOLIC BLOOD PRESSURE: 163 MMHG | DIASTOLIC BLOOD PRESSURE: 88 MMHG | HEIGHT: 59 IN | HEART RATE: 91 BPM | TEMPERATURE: 98 F | WEIGHT: 110.01 LBS | OXYGEN SATURATION: 98 % | RESPIRATION RATE: 16 BRPM

## 2022-12-20 DIAGNOSIS — S01.81XA LACERATION WITHOUT FOREIGN BODY OF OTHER PART OF HEAD, INITIAL ENCOUNTER: ICD-10-CM

## 2022-12-20 DIAGNOSIS — Z79.82 LONG TERM (CURRENT) USE OF ASPIRIN: ICD-10-CM

## 2022-12-20 DIAGNOSIS — I10 ESSENTIAL (PRIMARY) HYPERTENSION: ICD-10-CM

## 2022-12-20 DIAGNOSIS — M19.90 UNSPECIFIED OSTEOARTHRITIS, UNSPECIFIED SITE: ICD-10-CM

## 2022-12-20 DIAGNOSIS — M79.645 PAIN IN LEFT FINGER(S): ICD-10-CM

## 2022-12-20 DIAGNOSIS — M81.0 AGE-RELATED OSTEOPOROSIS WITHOUT CURRENT PATHOLOGICAL FRACTURE: ICD-10-CM

## 2022-12-20 DIAGNOSIS — Z23 ENCOUNTER FOR IMMUNIZATION: ICD-10-CM
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
## 2022-12-20 DIAGNOSIS — S62.647A NONDISPLACED FRACTURE OF PROXIMAL PHALANX OF LEFT LITTLE FINGER, INITIAL ENCOUNTER FOR CLOSED FRACTURE: ICD-10-CM

## 2022-12-20 DIAGNOSIS — F03.90 UNSPECIFIED DEMENTIA WITHOUT BEHAVIORAL DISTURBANCE: ICD-10-CM
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
## 2022-12-20 DIAGNOSIS — E03.9 HYPOTHYROIDISM, UNSPECIFIED: ICD-10-CM

## 2022-12-20 PROCEDURE — 90471 IMMUNIZATION ADMIN: CPT

## 2022-12-20 PROCEDURE — 73130 X-RAY EXAM OF HAND: CPT | Mod: LT

## 2022-12-20 PROCEDURE — 99284 EMERGENCY DEPT VISIT MOD MDM: CPT

## 2022-12-20 PROCEDURE — 72125 CT NECK SPINE W/O DYE: CPT | Mod: MA

## 2022-12-20 PROCEDURE — 70450 CT HEAD/BRAIN W/O DYE: CPT | Mod: 26,MA

## 2022-12-20 PROCEDURE — 99284 EMERGENCY DEPT VISIT MOD MDM: CPT | Mod: 25

## 2022-12-20 PROCEDURE — 70450 CT HEAD/BRAIN W/O DYE: CPT | Mod: MA

## 2022-12-20 PROCEDURE — 72125 CT NECK SPINE W/O DYE: CPT | Mod: 26,MA

## 2022-12-20 PROCEDURE — 73130 X-RAY EXAM OF HAND: CPT | Mod: 26,LT

## 2022-12-20 PROCEDURE — 90715 TDAP VACCINE 7 YRS/> IM: CPT

## 2022-12-20 PROCEDURE — 82962 GLUCOSE BLOOD TEST: CPT

## 2022-12-20 RX ORDER — TETANUS TOXOID, REDUCED DIPHTHERIA TOXOID AND ACELLULAR PERTUSSIS VACCINE, ADSORBED 5; 2.5; 8; 8; 2.5 [IU]/.5ML; [IU]/.5ML; UG/.5ML; UG/.5ML; UG/.5ML
0.5 SUSPENSION INTRAMUSCULAR ONCE
Refills: 0 | Status: COMPLETED | OUTPATIENT
Start: 2022-12-20 | End: 2022-12-20

## 2022-12-20 RX ADMIN — TETANUS TOXOID, REDUCED DIPHTHERIA TOXOID AND ACELLULAR PERTUSSIS VACCINE, ADSORBED 0.5 MILLILITER(S): 5; 2.5; 8; 8; 2.5 SUSPENSION INTRAMUSCULAR at 21:13

## 2022-12-20 NOTE — ED PROVIDER NOTE - OBJECTIVE STATEMENT
89 y/o female with a PMHx of dementia, macular degeneration, arthritis, dementia, [place], HTN, hypothyroidism, osteoporosis, poor historian presents to the ED s/p mechanical fall around 7:45pm today. Aid at bedside states that the pt got up and took a few steps when she fell forward +head trauma, +laceration with some bleeding, no LOC, no vomiting. Pt on ASA, endorses some pain to her left 5th digit. Pt usually ambulates with a walker. Neuro alert called by Dr. Ayala.

## 2022-12-20 NOTE — ED ADULT NURSE NOTE - PRO INTERPRETER NEED 2
Received notification of patient's death from [employer].  Place of death was reported as [facility].  Graft status at the time of death was reported as Not functioning.  Additional information: Pt was direct admit to coronary care unit for management of her aortic regurg and stenosis. Plan for emergent aortic valve repair of prior valve replacment. Unfortunately, patient was hypotensive and eventually unresponsive to multiple pressors with SBP 40s. Patient was DNR/DNI and passed without resusitative efforts.  TIS verification is: Complete      
English

## 2022-12-20 NOTE — ED ADULT TRIAGE NOTE - CHIEF COMPLAINT QUOTE
pt complaining of injury to head and left pinky finger after a mechanical fall at the [facility]. pt is on ASA.  pt has laceration to head.  pt is a NA as per Dr. Ayala

## 2022-12-20 NOTE — ED PROVIDER NOTE - NS ED ROS FT
Constitutional: negative for fevers/chills  HENT: no hearing problems, sore throat, nasal congestion  Eyes: no visual disturbances  Neck: no neck stiffness or neck pain  Cardiovascular: no chest pain, no palpitations, no edema  Respiratory: no cough, no shortness of breath  Musculoskeletal: no back pain. +pain left 5th digit.   Gastrointestinal: no abdominal pain, nausea, vomiting  : no dysuria or hematuria or polyuria  Neuro: no headaches, no numbness or tingling, no dizziness  Skin: no rashes. +head laceration

## 2022-12-20 NOTE — ED PROVIDER NOTE - PROGRESS NOTE DETAILS
Patient currently at baseline per discussion with aide. CT scan negative, would be able to go back to facility. will place liv tape for fx of finger. Misael Ayala MD.

## 2022-12-20 NOTE — ED PROVIDER NOTE - NSICDXPASTMEDICALHX_GEN_ALL_CORE_FT
PAST MEDICAL HISTORY:  Arthritis     Degeneration macular     Dementia     [place] (hard of hearing)     HTN (hypertension)     Hypothyroidism     Osteoporosis     Poor historian

## 2022-12-20 NOTE — ED PROVIDER NOTE - CARE PROVIDER_API CALL
Nickolas Del Valle)  Orthopaedic Surgery; Surgery of the Hand  [address] [place] [address]  Phone: [phone]  Fax: [phone]  Follow Up Time: 7-10 Days    [place] [address]  Phone: [phone]  Fax: [phone]  Follow Up Time: 4-6 Days

## 2022-12-20 NOTE — ED ADULT NURSE NOTE - OBJECTIVE STATEMENT
Assumed care of patient in main 12. Pt a&ox1 rr even and unlabored with pmhx of hypothyroidism, macular degeneration, arthritis, dementia, htn, osteoporosis presents to ed s/p mechanical fall at [facility]. Pt's aide at bedside. Pt noted to have 2cm lac on L side of forhead/bruising/bleeding controlled prior to arrival with a bandage/wrap. Pt at baseline mental status, no LOC as per aide, on aspirin 81mg once a daily for ac. Pt denies chest pain, changes in vision, pain, gu/gi symptoms. pt educated on plan of care, pt able to successfully teach back plan of care to RN, RN will continue to reeducate pt during hospital stay.

## 2022-12-20 NOTE — ED PROVIDER NOTE - PHYSICAL EXAMINATION
Constitutional: Awake, Alert, non-toxic. No acute distress. Well appearing, well nourished.   HEAD: Normocephalic. Right frontal hematoma.  EYES: Pinpoint b/l, EOM intact, conjunctiva and sclera are clear bilaterally.  ENT: External ears normal. No rhinorrhea, no tracheal deviation   NECK: Supple, non-tender  CARDIOVASCULAR: regular rate and rhythm.  RESPIRATORY: Normal respiratory effort; breath sounds CTAB, no wheezes, rhonchi, or rales. Speaking in full sentences. No accessory muscle use.   ABDOMEN: Soft; non-tender, non-distended. No rebound or guarding.   EXTREMITIES:  no lower extremity edema, no deformities. No C, T, or L spine tenderness or obvious step-offs. No pelvic TTP, left pinky with bruising but no deformity.   SKIN: Warm, dry  NEURO: A&O x1. Sensory and motor functions are grossly intact. Speech is normal. No facial droop. [place] but will follow commands. Constitutional: Awake, Alert, non-toxic. No acute distress. Well appearing, well nourished.   HEAD: Normocephalic. left frontal hematoma with skin tear, no open laceration requiring repair  EYES: Pinpoint b/l, EOM intact, conjunctiva and sclera are clear bilaterally.  ENT: External ears normal. No rhinorrhea, no tracheal deviation   NECK: Supple, non-tender  CARDIOVASCULAR: regular rate and rhythm.  RESPIRATORY: Normal respiratory effort; breath sounds CTAB, no wheezes, rhonchi, or rales. Speaking in full sentences. No accessory muscle use.   ABDOMEN: Soft; non-tender, non-distended. No rebound or guarding.   EXTREMITIES:  no lower extremity edema, no deformities. No C, T, or L spine tenderness or obvious step-offs. No pelvic TTP, left pinky with bruising but no deformity.   SKIN: Warm, dry  NEURO: A&O x1. Sensory and motor functions are grossly intact. Speech is normal. No facial droop. [place] but will follow commands.

## 2022-12-20 NOTE — ED PROVIDER NOTE - PROVIDER TOKENS
PROVIDER:[TOKEN:[[id number]],FOLLOWUP:[7-10 Days]],PROVIDER:[TOKEN:[[id number]],FOLLOWUP:[4-6 Days]]

## 2022-12-20 NOTE — ED PROVIDER NOTE - CARE PROVIDERS DIRECT ADDRESSES
,DirectAddress_Unknown,[email or web address]

## 2022-12-20 NOTE — ED PROVIDER NOTE - NSFOLLOWUPINSTRUCTIONS_ED_ALL_ED_FT
Keep fingers taped together until able to be seen by orthopedic doctor.  Plan to follow-up with Dr. Del Valle.    Fall Prevention for Older Adults    WHAT YOU NEED TO KNOW:    As you age, your muscles weaken and your risk for falls increases. Your risk also increases if you take medicines that make you sleepy or dizzy. You may also be at risk if you have vision or joint problems, have low blood pressure, or are not active.    DISCHARGE INSTRUCTIONS:    Call 911 or have someone else call if:   •You have fallen and are unconscious.    •You have fallen and cannot move part of your body.    Contact your healthcare provider if:   •You have fallen and have pain or a headache.    •You have questions or concerns about your condition or care.    Fall prevention tips:   •Stay active. Exercise can help strengthen your muscles and improve your balance. Your healthcare provider may recommend water aerobics, walking, or [place]. He or she may also recommend physical therapy to improve your coordination. Never start an exercise program without asking your healthcare provider first.    •Wear shoes that fit well and have soles that . Wear shoes both inside and outside. Use slippers with good . Avoid shoes with high heels.    •Use assistive devices as directed. Your healthcare provider may suggest that you use a cane or walker to help you keep your balance. You may need to have grab bars put in your bathroom near the toilet or in the shower.    •Stand or sit up slowly. This may help you keep your balance and prevent falls.    •Wear a personal alarm. This is a device that allows you to call 911 if you need help. Ask for more information on personal alarms.    •Manage your medical conditions.  Keep all appointments with your healthcare providers. Visit your eye doctor as directed.    Home safety tips:     Fall Prevention for Seniors     •Add items to prevent falls in the bathroom. Put nonslip strips on your bath or shower floor to prevent you from slipping. Use a bath mat if you do not have carpet in the bathroom. This will prevent you from falling when you step out of the bath or shower. Use a shower seat so you do not need to stand while you shower. Sit on the toilet or a chair in your bathroom to dry yourself and put on clothing. This will prevent you from losing your balance from drying or dressing yourself while you are standing.    •Keep paths clear. Remove books, shoes, and other objects from walkways and stairs. Place cords for telephones and lamps out of the way so that you do not need to walk over them. Tape them down if you cannot move them. Remove small rugs. If you cannot remove a rug, secure it with double-sided tape. This will prevent you from tripping.    •Install bright lights in your home. Use night lights to help light paths to the bathroom or kitchen. Always turn on the light before you start walking.    •Keep items you use often on shelves within reach. Do not use a step stool to help you reach an item.    •Paint or place reflective tape on the edges of your stairs. This will help you see the stairs better.    Follow up with your doctor as directed: Write down your questions so you remember to ask them during your visits.

## 2022-12-20 NOTE — ED PROVIDER NOTE - CLINICAL SUMMARY MEDICAL DECISION MAKING FREE TEXT BOX
Pt here after trip and fall when she hit her head, no LOC or vomiting. Given age and on ASA, will evaluate for intracranial injury. Also endorsing left pinky pain, otherwise no other evidence of trauma at this time to warrant additional trauma workup. Per aid at bedside, this was non-syncopal. Will make neuro alert and obtain x-ray.

## 2022-12-20 NOTE — ED PROVIDER NOTE - PATIENT PORTAL LINK FT
You can access the FollowMyHealth Patient Portal offered by [facility] by registering at the following website: [email or web address]. By joining [employer]’s FollowMyHealth portal, you will also be able to view your health information using other applications (apps) compatible with our system.

## 2022-12-21 VITALS
OXYGEN SATURATION: 94 % | RESPIRATION RATE: 18 BRPM | HEART RATE: 73 BPM | DIASTOLIC BLOOD PRESSURE: 94 MMHG | SYSTOLIC BLOOD PRESSURE: 139 MMHG | TEMPERATURE: 97 F

## 2022-12-21 RX ORDER — ACETAMINOPHEN 500 MG
650 TABLET ORAL ONCE
Refills: 0 | Status: COMPLETED | OUTPATIENT
Start: 2022-12-21 | End: 2022-12-21

## 2022-12-21 RX ADMIN — Medication 650 MILLIGRAM(S): at 02:10

## 2023-03-14 NOTE — ED ADULT NURSE NOTE - BREATH SOUNDS, RIGHT
Daily Note     Today's date: 3/14/2023  Patient name: Gayatri Allen  : 2015  MRN: [id number]  Referring provider: Fabrizio Barrios DO  Dx:   Encounter Diagnosis     ICD-10-CM    1  Developmental disability  F89       2  Autistic spectrum disorder  F84 0           Start Time: 0830  Stop Time: 0900  Total time in clinic (min): 30 minutes    1* [employer]     Subjective: Pt brought to therapy session by mom, end lópez Fong Concepcion was reviewed with mom  OTR discussed with mom new goals that will be addressed in the next quarter  Mom also reports pt has been independently donning/doffing shoes at home  Glasses donned for today's session, pt did remove glasses when session was completed  OTS active and facilitating  Discussed updating goals to focus on ADLs, especially brushing teeth & bathing as mom reports he requires Mod-Max A for both tasks  Mom reports pt covers ears in the shower  Discussed potential use of mirror  Objective:   -Low frustration tolerance/refusal behavior (e g "whining); however, pt was redirected with "first, then" language and preferred activity as motivator to complete adult-led task      -Pt seen in big swing room  Neuro Re-ed & There Ex:   Targeted sensory modulation, motor praxis, B/L integration, UE/core and proximal stability/strengthening with completing OC of crawling up slanted ramp to roll medicine balls off the ramp x5  Pt was required to modeling, max vc and tactile cues to use both hand to push weighted balls up long ramp x3 fading to min-mod vc and tactile cues for remaining 2 trials  Pt benefited from using pretend airplane with little people figures (preferred toy) in between completing adult led activity    -Targeted sensory modulation, motor praxis, B/L integration, UE/core and proximal  stability/strengthening with completing multi-step sensory OC of crawling through abcrobat swing   Pt was slow to warm up with new sensory equipment and benefited from swinging in a slow linear movement before introducing an additional step  Pt required mod-max vc and physical assistance to maneuver through x1 layer fading to min-mod vc to complete remaining 3 layers  Pt benefited from using little people figures (preferred toy) to crawling through the layers to retrieve  Therapeutic Activity & Cog:     -Challenged play skills and sensory processing/self regulation skills needed to transition between non-preferred (ramp OC) and preferred activity (airplane)  Pt demonstrated improvements in self-regulation skills needed on this date; however, pt continues to benefit from "first, then" language when pt was presented with ramp OC  Pt benefited from play airplane as motivator to complete OC   -Targeted FM/VM skills during [facility] shape prewriting task  Therapist provided a model to complete vertical line, pt completed 1 vertical line with min vc  Pt was required to reproduce a horizontal stroke after initial model was provided; however, pt benefited from an additional 3 models, max vc and visual cues to complete 3 horizontal prestrokes  Pt demonstrated good effort to completed 2 circles after therapist provided 2 models  Pt continues to benefit from opportunities to reproduce circles as he demonstrates improved FM skills to reproduce the formation of a [place]  Pt benefits from mod-max vc and visual cues to complete circles; noted, pt completed 1 [place] with no closure and the remaining [place] with closure  Pt completed 2 crosses after therapist provided model, pt completed each cross starting from bottom up for the vertical lines and R to L when completing the horizontal line to form the cross  Pt completed 1 diagonal line (e g  /) with min vc  Pt completed 1  "x" after therapist provided model, noted pt started from the bottom up for each line required   Pt completed 1 triangle with therapist providing 2 models as pt requires max verbal cuing and visual cues to reproduce accurate representation of triangle despite pt demonstrating good effort to reproduce shape  Pt continues to demonstrated g effort throughout prewriting opportunites; however, pt continues to benefit from vc, visual cues, redirection and use of preferred activity as a motivator to complete prewriting task  Pt used 3-4 static point grasp while using his R hand during pre-writing activity 100% of the time  Self-Care:  -Challenged B/L integration and postural control as needed to doff/don shoes  Pt doffed/donned shoes independently on this date  Pt benefited from min vc and redirection as he was transitioning between preferred activity to a non-preferred self-care task  Assessment: Tolerated treatment well  Patient would benefit from continued OT      Plan: Continue per plan of care  Short term goals:    1) Pt will demonstrate improvements with bilateral coordination skills to uday his socks and shoes with independence with minimal visual, verbal, and/or tactile cues in 3:4 opportunities within 12 weeks  2) Pt will demonstrate improvements with sensory processing/self regulation skills needed to transition between non-preferred and preferred activities with minimal to no difficulty with use of sensory strategies and verbal cuing as needed in 3:4 opportunities within 12 weeks  3) Pt will demonstrate improvements with visual motor integration skills needed to copy all pre-writing shapes (vertical line, horizontal line, [place], +, /, X, and triangle) with use of visual cues as needed and minimal verbal cues in 3:4 opportunities within 12 weeks  Long term goals:  Improve FM/VM skills for ADLs and academia  Improve bilateral integration and laterality for ADLs and academia  Summary & Recommendations:     Sammy Guy was referred for an Occupational Therapy evaluation to assess concerns related to self care, academic, and attention skills   Skilled Occupational Therapy is recommended in order to address performance skills and goals as listed above  It is recommended that [facility] receive outpatient OT (1-2/week) as needed to improve performance and independence in (ADLs, School, Home Environment, and Community)     Frequency: 1-2x/week    Duration: 12 weeks     Skilled Interventions to Include:    Therapeutic Exercise   Neuromuscular Re-Education   Therapeutic Activities   Self Care Management clear

## 2023-03-27 NOTE — ED ADULT NURSE NOTE - NSFALLRSKPASTHIST_ED_ALL_ED
Physical Therapy    Physical Therapy Treatment Note  Name: Raoul Acuña MRN: [id number] :   1949   Date:  3/27/2023   Admission Date: 3/20/2023 Room:  [address] [place] [address]   Restrictions/Precautions:  fall risk ; general precautions; abdominal precautions  Communication with other providers:  RN clears for participation, RN handoff  Subjective:  Patient states:  \"I'm feeling much much better. I got to walk more over this weekend too! \"  Pain:   Location, Type, Intensity (0/10 to 10/10):  pain w/ bending ; none at rest  Objective:    Observation:  Received up on toilet completing BM, pt completing pt care. Pt is more impulsive and unsafe on this date, able to redirect w/ min cues. Tele, IV in place on arrival  Treatment, including education/measures: Toilet transfer: CGA  Standing balance: fair  Stand <-> sit: SBA  STS: CGA for steady  Left in recliner on completion, chair alarmed, call light in reach, gait belt for OOB, all needs met    Gait Training:  Cues were given for safety, sequence, device management, balance, posture, awareness, path. 10 ft + 100 ft + 100 ft using FWW at [facility] ; intermittent steadying assist and attention to AD mgmt, dec steffen, moderate fwd flexed posture, endurance limiting distance, attends to cues w/ mod cues for redirections, recicprocal    Assessment / Impression:    Pt tolerating great inc mobility well, less painful and guarded on this date. She is moving w/ fair quality, limited by safety awareness and impulsivity notably on this date, requires mod cues to attend to pacing/AD mgmt/posture. Will cont to follow.     Patient's tolerance of treatment:  fair +  Barriers to improvement:  safety awareness, balance, functional mobility, cognition  Plan for Next Session:    Cont w/ est POC    Time in:  0947  Time out:  1003  Timed treatment minutes:  14  Total treatment time:  16    Previously filed items:  Social/Functional History  Lives With: Daughter  Type of Home: House  Home Layout: yes

## 2023-04-05 NOTE — ED ADULT TRIAGE NOTE - CHIEF COMPLAINT QUOTE
Is This A New Presentation, Or A Follow-Up?: Skin Lesions How Severe Is Your Skin Lesion?: mild Has Your Skin Lesion Been Treated?: not been treated pt a+ox3, BIBA from Nursing Home s/p witnessed fall. pt reports daily ASA, denies daily thinners. pt c/o right heel pain, denies hitting head or LOC.

## 2024-01-01 ENCOUNTER — TRANSCRIPTION ENCOUNTER (OUTPATIENT)
Age: 89
End: 2024-01-01

## 2024-01-01 ENCOUNTER — INPATIENT (INPATIENT)
Facility: HOSPITAL | Age: 89
LOS: 1 days | DRG: 871 | End: 2024-06-16
Attending: STUDENT IN AN ORGANIZED HEALTH CARE EDUCATION/TRAINING PROGRAM | Admitting: INTERNAL MEDICINE
Payer: MEDICARE

## 2024-01-01 ENCOUNTER — RESULT REVIEW (OUTPATIENT)
Age: 89
End: 2024-01-01

## 2024-01-01 VITALS
TEMPERATURE: 99 F | DIASTOLIC BLOOD PRESSURE: 61 MMHG | OXYGEN SATURATION: 98 % | HEART RATE: 91 BPM | SYSTOLIC BLOOD PRESSURE: 137 MMHG | RESPIRATION RATE: 19 BRPM

## 2024-01-01 VITALS
SYSTOLIC BLOOD PRESSURE: 142 MMHG | DIASTOLIC BLOOD PRESSURE: 68 MMHG | WEIGHT: 130.07 LBS | RESPIRATION RATE: 24 BRPM | HEIGHT: 62 IN | TEMPERATURE: 99 F | HEART RATE: 95 BPM | OXYGEN SATURATION: 86 %

## 2024-01-01 DIAGNOSIS — Z29.9 ENCOUNTER FOR PROPHYLACTIC MEASURES, UNSPECIFIED: ICD-10-CM

## 2024-01-01 DIAGNOSIS — I10 ESSENTIAL (PRIMARY) HYPERTENSION: ICD-10-CM

## 2024-01-01 DIAGNOSIS — N39.0 URINARY TRACT INFECTION, SITE NOT SPECIFIED: ICD-10-CM

## 2024-01-01 DIAGNOSIS — N17.9 ACUTE KIDNEY FAILURE, UNSPECIFIED: ICD-10-CM

## 2024-01-01 DIAGNOSIS — A41.9 SEPSIS, UNSPECIFIED ORGANISM: ICD-10-CM

## 2024-01-01 DIAGNOSIS — E03.9 HYPOTHYROIDISM, UNSPECIFIED: ICD-10-CM

## 2024-01-01 DIAGNOSIS — F03.90 UNSPECIFIED DEMENTIA, UNSPECIFIED SEVERITY, WITHOUT BEHAVIORAL DISTURBANCE, PSYCHOTIC DISTURBANCE, MOOD DISTURBANCE, AND ANXIETY: ICD-10-CM

## 2024-01-01 LAB
ALBUMIN SERPL ELPH-MCNC: 3.1 G/DL — LOW (ref 3.3–5)
ALP SERPL-CCNC: 99 U/L — SIGNIFICANT CHANGE UP (ref 40–120)
ALT FLD-CCNC: 17 U/L — SIGNIFICANT CHANGE UP (ref 12–78)
ANION GAP SERPL CALC-SCNC: 4 MMOL/L — LOW (ref 5–17)
APPEARANCE UR: ABNORMAL
APTT BLD: 22.9 SEC — LOW (ref 24.5–35.6)
AST SERPL-CCNC: 25 U/L — SIGNIFICANT CHANGE UP (ref 15–37)
BACTERIA # UR AUTO: ABNORMAL /HPF
BASOPHILS # BLD AUTO: 0.04 K/UL — SIGNIFICANT CHANGE UP (ref 0–0.2)
BASOPHILS NFR BLD AUTO: 0.2 % — SIGNIFICANT CHANGE UP (ref 0–2)
BILIRUB SERPL-MCNC: 1.3 MG/DL — HIGH (ref 0.2–1.2)
BILIRUB UR-MCNC: NEGATIVE — SIGNIFICANT CHANGE UP
BUN SERPL-MCNC: 18 MG/DL — SIGNIFICANT CHANGE UP (ref 7–23)
CALCIUM SERPL-MCNC: 8.9 MG/DL — SIGNIFICANT CHANGE UP (ref 8.5–10.1)
CAST: 0 /LPF — SIGNIFICANT CHANGE UP (ref 0–4)
CHLORIDE SERPL-SCNC: 108 MMOL/L — SIGNIFICANT CHANGE UP (ref 96–108)
CO2 SERPL-SCNC: 28 MMOL/L — SIGNIFICANT CHANGE UP (ref 22–31)
COLOR SPEC: YELLOW — SIGNIFICANT CHANGE UP
CREAT SERPL-MCNC: 1.36 MG/DL — HIGH (ref 0.5–1.3)
DIFF PNL FLD: ABNORMAL
EGFR: 36 ML/MIN/1.73M2 — LOW
EOSINOPHIL # BLD AUTO: 0.02 K/UL — SIGNIFICANT CHANGE UP (ref 0–0.5)
EOSINOPHIL NFR BLD AUTO: 0.1 % — SIGNIFICANT CHANGE UP (ref 0–6)
FLUAV AG NPH QL: SIGNIFICANT CHANGE UP
FLUBV AG NPH QL: SIGNIFICANT CHANGE UP
GLUCOSE BLDC GLUCOMTR-MCNC: 128 MG/DL — HIGH (ref 70–99)
GLUCOSE SERPL-MCNC: 140 MG/DL — HIGH (ref 70–99)
GLUCOSE UR QL: NEGATIVE MG/DL — SIGNIFICANT CHANGE UP
HCT VFR BLD CALC: 38.8 % — SIGNIFICANT CHANGE UP (ref 34.5–45)
HGB BLD-MCNC: 13 G/DL — SIGNIFICANT CHANGE UP (ref 11.5–15.5)
IMM GRANULOCYTES NFR BLD AUTO: 0.5 % — SIGNIFICANT CHANGE UP (ref 0–0.9)
INR BLD: 1.15 RATIO — SIGNIFICANT CHANGE UP (ref 0.85–1.18)
KETONES UR-MCNC: NEGATIVE MG/DL — SIGNIFICANT CHANGE UP
LACTATE SERPL-SCNC: 1.2 MMOL/L — SIGNIFICANT CHANGE UP (ref 0.7–2)
LEUKOCYTE ESTERASE UR-ACNC: ABNORMAL
LYMPHOCYTES # BLD AUTO: 0.65 K/UL — LOW (ref 1–3.3)
LYMPHOCYTES # BLD AUTO: 3.8 % — LOW (ref 13–44)
MCHC RBC-ENTMCNC: 30 PG — SIGNIFICANT CHANGE UP (ref 27–34)
MCHC RBC-ENTMCNC: 33.5 GM/DL — SIGNIFICANT CHANGE UP (ref 32–36)
MCV RBC AUTO: 89.4 FL — SIGNIFICANT CHANGE UP (ref 80–100)
MONOCYTES # BLD AUTO: 1.49 K/UL — HIGH (ref 0–0.9)
MONOCYTES NFR BLD AUTO: 8.6 % — SIGNIFICANT CHANGE UP (ref 2–14)
NEUTROPHILS # BLD AUTO: 14.95 K/UL — HIGH (ref 1.8–7.4)
NEUTROPHILS NFR BLD AUTO: 86.8 % — HIGH (ref 43–77)
NITRITE UR-MCNC: NEGATIVE — SIGNIFICANT CHANGE UP
NT-PROBNP SERPL-SCNC: 644 PG/ML — HIGH (ref 0–450)
PH UR: 6 — SIGNIFICANT CHANGE UP (ref 5–8)
PLATELET # BLD AUTO: 220 K/UL — SIGNIFICANT CHANGE UP (ref 150–400)
POTASSIUM SERPL-MCNC: 4.3 MMOL/L — SIGNIFICANT CHANGE UP (ref 3.5–5.3)
POTASSIUM SERPL-SCNC: 4.3 MMOL/L — SIGNIFICANT CHANGE UP (ref 3.5–5.3)
PROT SERPL-MCNC: 7.1 GM/DL — SIGNIFICANT CHANGE UP (ref 6–8.3)
PROT UR-MCNC: 30 MG/DL
PROTHROM AB SERPL-ACNC: 12.9 SEC — SIGNIFICANT CHANGE UP (ref 9.5–13)
RBC # BLD: 4.34 M/UL — SIGNIFICANT CHANGE UP (ref 3.8–5.2)
RBC # FLD: 14 % — SIGNIFICANT CHANGE UP (ref 10.3–14.5)
RBC CASTS # UR COMP ASSIST: 5 /HPF — HIGH (ref 0–4)
RSV RNA NPH QL NAA+NON-PROBE: SIGNIFICANT CHANGE UP
SARS-COV-2 RNA SPEC QL NAA+PROBE: SIGNIFICANT CHANGE UP
SODIUM SERPL-SCNC: 140 MMOL/L — SIGNIFICANT CHANGE UP (ref 135–145)
SP GR SPEC: 1.01 — SIGNIFICANT CHANGE UP (ref 1–1.03)
SQUAMOUS # UR AUTO: 0 /HPF — SIGNIFICANT CHANGE UP (ref 0–5)
TROPONIN I, HIGH SENSITIVITY RESULT: 25.28 NG/L — SIGNIFICANT CHANGE UP
UROBILINOGEN FLD QL: 0.2 MG/DL — SIGNIFICANT CHANGE UP (ref 0.2–1)
WBC # BLD: 17.23 K/UL — HIGH (ref 3.8–10.5)
WBC # FLD AUTO: 17.23 K/UL — HIGH (ref 3.8–10.5)
WBC UR QL: 530 /HPF — HIGH (ref 0–5)

## 2024-01-01 PROCEDURE — 99223 1ST HOSP IP/OBS HIGH 75: CPT

## 2024-01-01 PROCEDURE — 99232 SBSQ HOSP IP/OBS MODERATE 35: CPT

## 2024-01-01 PROCEDURE — 94640 AIRWAY INHALATION TREATMENT: CPT

## 2024-01-01 PROCEDURE — 93306 TTE W/DOPPLER COMPLETE: CPT | Mod: 26

## 2024-01-01 PROCEDURE — 99238 HOSP IP/OBS DSCHRG MGMT 30/<: CPT

## 2024-01-01 PROCEDURE — 93010 ELECTROCARDIOGRAM REPORT: CPT

## 2024-01-01 PROCEDURE — 82962 GLUCOSE BLOOD TEST: CPT

## 2024-01-01 PROCEDURE — 92523 SPEECH SOUND LANG COMPREHEN: CPT | Mod: GN

## 2024-01-01 PROCEDURE — 71045 X-RAY EXAM CHEST 1 VIEW: CPT | Mod: 26

## 2024-01-01 PROCEDURE — 99291 CRITICAL CARE FIRST HOUR: CPT

## 2024-01-01 PROCEDURE — 92610 EVALUATE SWALLOWING FUNCTION: CPT | Mod: GN

## 2024-01-01 PROCEDURE — 71250 CT THORAX DX C-: CPT | Mod: 26,MC

## 2024-01-01 RX ORDER — SODIUM CHLORIDE 0.9 % (FLUSH) 0.9 %
1000 SYRINGE (ML) INJECTION ONCE
Refills: 0 | Status: COMPLETED | OUTPATIENT
Start: 2024-01-01 | End: 2024-01-01

## 2024-01-01 RX ORDER — ENOXAPARIN SODIUM 100 MG/ML
30 INJECTION SUBCUTANEOUS EVERY 24 HOURS
Refills: 0 | Status: DISCONTINUED | OUTPATIENT
Start: 2024-01-01 | End: 2024-01-01

## 2024-01-01 RX ORDER — CEFEPIME 1 G/1
1000 INJECTION, POWDER, FOR SOLUTION INTRAMUSCULAR; INTRAVENOUS EVERY 12 HOURS
Refills: 0 | Status: DISCONTINUED | OUTPATIENT
Start: 2024-01-01 | End: 2024-01-01

## 2024-01-01 RX ORDER — AMMONIUM LACTATE 12 %
1 LOTION (GRAM) TOPICAL
Refills: 0 | DISCHARGE

## 2024-01-01 RX ORDER — BISACODYL 5 MG
1 TABLET, DELAYED RELEASE (ENTERIC COATED) ORAL
Qty: 0 | Refills: 0 | DISCHARGE
Start: 2024-01-01

## 2024-01-01 RX ORDER — LORATADINE 10 MG/1
1 TABLET ORAL
Refills: 0 | DISCHARGE

## 2024-01-01 RX ORDER — PIPERACILLIN SODIUM AND TAZOBACTAM SODIUM 3; .375 G/15ML; G/15ML
3.38 INJECTION, POWDER, LYOPHILIZED, FOR SOLUTION INTRAVENOUS ONCE
Refills: 0 | Status: COMPLETED | OUTPATIENT
Start: 2024-01-01 | End: 2024-01-01

## 2024-01-01 RX ORDER — DEXTROSE MONOHYDRATE AND SODIUM CHLORIDE 5; .3 G/100ML; G/100ML
500 INJECTION, SOLUTION INTRAVENOUS ONCE
Refills: 0 | Status: DISCONTINUED | OUTPATIENT
Start: 2024-01-01 | End: 2024-01-01

## 2024-01-01 RX ORDER — RISPERIDONE 0.5 MG/1
1 TABLET ORAL
Refills: 0 | DISCHARGE

## 2024-01-01 RX ORDER — LORAZEPAM 0.5 MG
0.5 TABLET ORAL
Qty: 0 | Refills: 0 | DISCHARGE
Start: 2024-01-01

## 2024-01-01 RX ORDER — NOREPINEPHRINE BITARTRATE 1 MG/ML
0.05 INJECTION INTRAVENOUS
Qty: 8 | Refills: 0 | Status: DISCONTINUED | OUTPATIENT
Start: 2024-01-01 | End: 2024-01-01

## 2024-01-01 RX ORDER — ACETAMINOPHEN 325 MG
1000 TABLET ORAL ONCE
Refills: 0 | Status: DISCONTINUED | OUTPATIENT
Start: 2024-01-01 | End: 2024-01-01

## 2024-01-01 RX ORDER — ASPIRIN 325 MG/1
81 TABLET, FILM COATED ORAL DAILY
Refills: 0 | Status: DISCONTINUED | OUTPATIENT
Start: 2024-01-01 | End: 2024-01-01

## 2024-01-01 RX ORDER — LEVOTHYROXINE SODIUM 25 MCG
1 TABLET ORAL
Refills: 0 | DISCHARGE

## 2024-01-01 RX ORDER — ACETAMINOPHEN 325 MG
1 TABLET ORAL
Qty: 0 | Refills: 0 | DISCHARGE
Start: 2024-01-01

## 2024-01-01 RX ORDER — ACETAMINOPHEN 325 MG
1000 TABLET ORAL ONCE
Refills: 0 | Status: COMPLETED | OUTPATIENT
Start: 2024-01-01 | End: 2024-01-01

## 2024-01-01 RX ORDER — SCOPOLAMINE 1.5 MG/1
1 PATCH, EXTENDED RELEASE TRANSDERMAL
Qty: 0 | Refills: 0 | DISCHARGE
Start: 2024-01-01

## 2024-01-01 RX ORDER — SCOPOLAMINE 1.5 MG/1
1 PATCH, EXTENDED RELEASE TRANSDERMAL
Refills: 0 | Status: DISCONTINUED | OUTPATIENT
Start: 2024-01-01 | End: 2024-01-01

## 2024-01-01 RX ORDER — VANCOMYCIN HYDROCHLORIDE 50 MG/ML
1000 KIT ORAL EVERY 24 HOURS
Refills: 0 | Status: DISCONTINUED | OUTPATIENT
Start: 2024-01-01 | End: 2024-01-01

## 2024-01-01 RX ORDER — SENNOSIDES 8.6 MG
2 TABLET ORAL AT BEDTIME
Refills: 0 | Status: DISCONTINUED | OUTPATIENT
Start: 2024-01-01 | End: 2024-01-01

## 2024-01-01 RX ORDER — PANTOPRAZOLE SODIUM 40 MG/10ML
40 INJECTION, POWDER, FOR SOLUTION INTRAVENOUS DAILY
Refills: 0 | Status: DISCONTINUED | OUTPATIENT
Start: 2024-01-01 | End: 2024-01-01

## 2024-01-01 RX ORDER — ONDANSETRON HYDROCHLORIDE 2 MG/ML
4 INJECTION INTRAMUSCULAR; INTRAVENOUS EVERY 8 HOURS
Refills: 0 | Status: DISCONTINUED | OUTPATIENT
Start: 2024-01-01 | End: 2024-01-01

## 2024-01-01 RX ORDER — ACETAMINOPHEN 325 MG
650 TABLET ORAL EVERY 6 HOURS
Refills: 0 | Status: DISCONTINUED | OUTPATIENT
Start: 2024-01-01 | End: 2024-01-01

## 2024-01-01 RX ORDER — LEVOTHYROXINE SODIUM 25 MCG
50 TABLET ORAL DAILY
Refills: 0 | Status: DISCONTINUED | OUTPATIENT
Start: 2024-01-01 | End: 2024-01-01

## 2024-01-01 RX ORDER — PIPERACILLIN SODIUM AND TAZOBACTAM SODIUM 3; .375 G/15ML; G/15ML
3.38 INJECTION, POWDER, LYOPHILIZED, FOR SOLUTION INTRAVENOUS EVERY 8 HOURS
Refills: 0 | Status: DISCONTINUED | OUTPATIENT
Start: 2024-01-01 | End: 2024-01-01

## 2024-01-01 RX ORDER — ACETYLCYSTEINE 200 MG/ML
4 SOLUTION ORAL; RESPIRATORY (INHALATION) EVERY 6 HOURS
Refills: 0 | Status: DISCONTINUED | OUTPATIENT
Start: 2024-01-01 | End: 2024-01-01

## 2024-01-01 RX ORDER — VANCOMYCIN HYDROCHLORIDE 50 MG/ML
1000 KIT ORAL ONCE
Refills: 0 | Status: COMPLETED | OUTPATIENT
Start: 2024-01-01 | End: 2024-01-01

## 2024-01-01 RX ORDER — POLYETHYLENE GLYCOL 3350 1 G/G
17 POWDER ORAL
Refills: 0 | DISCHARGE

## 2024-01-01 RX ORDER — LORAZEPAM 0.5 MG
0.5 TABLET ORAL EVERY 6 HOURS
Refills: 0 | Status: DISCONTINUED | OUTPATIENT
Start: 2024-01-01 | End: 2024-01-01

## 2024-01-01 RX ORDER — FOLIC ACID
1 POWDER (GRAM) MISCELLANEOUS
Refills: 0 | DISCHARGE

## 2024-01-01 RX ORDER — DEXTROSE MONOHYDRATE AND SODIUM CHLORIDE 5; .3 G/100ML; G/100ML
1000 INJECTION, SOLUTION INTRAVENOUS
Refills: 0 | Status: DISCONTINUED | OUTPATIENT
Start: 2024-01-01 | End: 2024-01-01

## 2024-01-01 RX ORDER — MAGNESIUM, ALUMINUM HYDROXIDE 400-400
30 TABLET,CHEWABLE ORAL EVERY 4 HOURS
Refills: 0 | Status: DISCONTINUED | OUTPATIENT
Start: 2024-01-01 | End: 2024-01-01

## 2024-01-01 RX ORDER — SENNOSIDES 8.6 MG
1 TABLET ORAL
Refills: 0 | DISCHARGE

## 2024-01-01 RX ORDER — FOLIC ACID 0.8 MG
1 TABLET ORAL
Qty: 0 | Refills: 0 | DISCHARGE

## 2024-01-01 RX ORDER — FUROSEMIDE 10 MG/ML
1 INJECTION, SOLUTION INTRAMUSCULAR; INTRAVENOUS
Refills: 0 | DISCHARGE

## 2024-01-01 RX ORDER — MORPHINE SULFATE 100 MG/1
2 TABLET, EXTENDED RELEASE ORAL
Qty: 0 | Refills: 0 | DISCHARGE
Start: 2024-01-01

## 2024-01-01 RX ORDER — HALOPERIDOL DECANOATE 100 MG/ML
0.5 VIAL (ML) INTRAMUSCULAR
Refills: 0 | Status: DISCONTINUED | OUTPATIENT
Start: 2024-01-01 | End: 2024-01-01

## 2024-01-01 RX ORDER — MORPHINE SULFATE 100 MG/1
2 TABLET, EXTENDED RELEASE ORAL
Refills: 0 | Status: DISCONTINUED | OUTPATIENT
Start: 2024-01-01 | End: 2024-01-01

## 2024-01-01 RX ORDER — DOCUSATE SODIUM 100 MG
1 CAPSULE ORAL
Qty: 0 | Refills: 0 | DISCHARGE

## 2024-01-01 RX ORDER — IPRATROPIUM BROMIDE AND ALBUTEROL SULFATE .5; 3 MG/3ML; MG/3ML
3 SOLUTION RESPIRATORY (INHALATION) EVERY 6 HOURS
Refills: 0 | Status: DISCONTINUED | OUTPATIENT
Start: 2024-01-01 | End: 2024-01-01

## 2024-01-01 RX ORDER — BISACODYL 5 MG
10 TABLET, DELAYED RELEASE (ENTERIC COATED) ORAL DAILY
Refills: 0 | Status: DISCONTINUED | OUTPATIENT
Start: 2024-01-01 | End: 2024-01-01

## 2024-01-01 RX ORDER — POLYVINYL ALCOHOL, POVIDONE .5; .6 G/100ML; G/100ML
2 SOLUTION OPHTHALMIC
Qty: 0 | Refills: 0 | DISCHARGE
Start: 2024-01-01

## 2024-01-01 RX ORDER — POLYVINYL ALCOHOL, POVIDONE .5; .6 G/100ML; G/100ML
2 SOLUTION OPHTHALMIC
Refills: 0 | Status: DISCONTINUED | OUTPATIENT
Start: 2024-01-01 | End: 2024-01-01

## 2024-01-01 RX ADMIN — Medication 650 MILLIGRAM(S): at 18:01

## 2024-01-01 RX ADMIN — PIPERACILLIN SODIUM AND TAZOBACTAM SODIUM 200 GRAM(S): 3; .375 INJECTION, POWDER, LYOPHILIZED, FOR SOLUTION INTRAVENOUS at 09:42

## 2024-01-01 RX ADMIN — PIPERACILLIN SODIUM AND TAZOBACTAM SODIUM 25 GRAM(S): 3; .375 INJECTION, POWDER, LYOPHILIZED, FOR SOLUTION INTRAVENOUS at 23:49

## 2024-01-01 RX ADMIN — ACETYLCYSTEINE 4 MILLILITER(S): 200 SOLUTION ORAL; RESPIRATORY (INHALATION) at 01:46

## 2024-01-01 RX ADMIN — NOREPINEPHRINE BITARTRATE 5.74 MICROGRAM(S)/KG/MIN: 1 INJECTION INTRAVENOUS at 12:36

## 2024-01-01 RX ADMIN — IPRATROPIUM BROMIDE AND ALBUTEROL SULFATE 3 MILLILITER(S): .5; 3 SOLUTION RESPIRATORY (INHALATION) at 01:47

## 2024-01-01 RX ADMIN — SCOPOLAMINE 1 PATCH: 1.5 PATCH, EXTENDED RELEASE TRANSDERMAL at 16:00

## 2024-01-01 RX ADMIN — CEFEPIME 1000 MILLIGRAM(S): 1 INJECTION, POWDER, FOR SOLUTION INTRAMUSCULAR; INTRAVENOUS at 23:20

## 2024-01-01 RX ADMIN — Medication 400 MILLIGRAM(S): at 11:01

## 2024-01-01 RX ADMIN — SCOPOLAMINE 1 PATCH: 1.5 PATCH, EXTENDED RELEASE TRANSDERMAL at 07:00

## 2024-01-01 RX ADMIN — Medication 1000 MILLILITER(S): at 11:34

## 2024-01-01 RX ADMIN — Medication 0.5 MILLIGRAM(S): at 13:47

## 2024-01-01 RX ADMIN — PIPERACILLIN SODIUM AND TAZOBACTAM SODIUM 25 GRAM(S): 3; .375 INJECTION, POWDER, LYOPHILIZED, FOR SOLUTION INTRAVENOUS at 14:22

## 2024-01-01 RX ADMIN — VANCOMYCIN HYDROCHLORIDE 250 MILLIGRAM(S): KIT at 09:41

## 2024-01-01 RX ADMIN — Medication 650 MILLIGRAM(S): at 02:10

## 2024-01-01 RX ADMIN — Medication 1000 MILLILITER(S): at 09:15

## 2024-01-01 RX ADMIN — MORPHINE SULFATE 2 MILLIGRAM(S): 100 TABLET, EXTENDED RELEASE ORAL at 11:09

## 2024-01-01 RX ADMIN — Medication 2 TABLET(S): at 21:50

## 2024-04-05 NOTE — ED PROVIDER NOTE - IV ALTEPLASE DOOR HIDDEN
AMIRA MCGUIRE  32y  Female   #[id number]    PGY2 Note:  Pt is a 33yo PPD#1. Pt is recovering well, no acute complaints. Pt states her pain is well controlled. Pt denies fever, chills, nausea, vomiting, SOB, chest pain, extremity swelling or pain. She is ambulating, voiding, tolerating PO, passing flatus.    MEDICATIONS  (STANDING):  acetaminophen     Tablet .. 975 milliGRAM(s) Oral <User Schedule>  diphtheria/tetanus/pertussis (acellular) Vaccine (Adacel) 0.5 milliLiter(s) IntraMuscular once  ibuprofen  Tablet. 600 milliGRAM(s) Oral every 6 hours  oxytocin Infusion 41.667 milliUNIT(s)/Min (125 mL/Hr) IV Continuous <Continuous>  prenatal multivitamin 1 Tablet(s) Oral daily  sodium chloride 0.9% lock flush 3 milliLiter(s) IV Push every 8 hours    MEDICATIONS  (PRN):  benzocaine 20%/menthol 0.5% Spray 1 Spray(s) Topical every 6 hours PRN for Perineal discomfort  dibucaine 1% Ointment 1 Application(s) Topical every 6 hours PRN Perineal discomfort  diphenhydrAMINE 25 milliGRAM(s) Oral every 6 hours PRN Pruritus  hydrocortisone 1% Cream 1 Application(s) Topical every 6 hours PRN Moderate Pain (4-6)  lanolin Ointment 1 Application(s) Topical every 6 hours PRN nipple soreness  magnesium hydroxide Suspension 30 milliLiter(s) Oral two times a day PRN Constipation  oxyCODONE    IR 5 milliGRAM(s) Oral every 3 hours PRN Moderate to Severe Pain (4-10)  oxyCODONE    IR 5 milliGRAM(s) Oral once PRN Moderate to Severe Pain (4-10)  pramoxine 1%/zinc 5% Cream 1 Application(s) Topical every 4 hours PRN Moderate Pain (4-6)  simethicone 80 milliGRAM(s) Chew every 4 hours PRN Gas  witch hazel Pads 1 Application(s) Topical every 4 hours PRN Perineal discomfort    PAST MEDICAL & SURGICAL HISTORY:  No pertinent past medical history  Asthma  No significant past surgical history  H/O hand surgery - ganglion cyst    Physical Exam  Vital Signs Last 24 Hrs  T(C): 36.8 (04 Apr 2024 23:40), Max: 37.3 (04 Apr 2024 08:07)  T(F): 98.2 (04 Apr 2024 23:40), Max: 99.2 (04 Apr 2024 08:07)  HR: 75 (04 Apr 2024 23:40) (73 - 90)  BP: 99/58 (04 Apr 2024 23:40) (99/58 - 108/61)  RR: 18 (04 Apr 2024 23:40) (18 - 20)    Gen: AAOx3, NAD  Fundus: firm, below umbilicus   Abd: Soft, nontender, nondistended, BS+  Lochia: minimal  Ext: No calf tenderness, no swelling    Labs:                        9.1    12.99 )-----------( 271      ( 04 Apr 2024 17:31 )             26.9                         10.8   13.00 )-----------( 301      ( 04 Apr 2024 04:10 )             32.3         AMIRA MCGUIRE  32y  Female   #[id number]    PGY2 Note:  Pt is a 31yo PPD#1. Pt is recovering well, no acute complaints. Pt states her pain is well controlled. Pt denies fever, chills, nausea, vomiting, SOB, chest pain, extremity swelling or pain. She is ambulating, voiding, tolerating PO, passing flatus.            Physical Exam  Vital Signs Last 24 Hrs  T(C): 36.8 (04 Apr 2024 23:40), Max: 37.3 (04 Apr 2024 08:07)  T(F): 98.2 (04 Apr 2024 23:40), Max: 99.2 (04 Apr 2024 08:07)  HR: 75 (04 Apr 2024 23:40) (73 - 90)  BP: 99/58 (04 Apr 2024 23:40) (99/58 - 108/61)  RR: 18 (04 Apr 2024 23:40) (18 - 20)    Gen: AAOx3, NAD  Fundus: firm, below umbilicus   Abd: Soft, nontender, nondistended, BS+  Lochia: minimal  Ext: No calf tenderness, no swelling    Labs:                        9.1    12.99 )-----------( 271      ( 04 Apr 2024 17:31 )             26.9                         10.8   13.00 )-----------( 301      ( 04 Apr 2024 04:10 )             32.3         show

## 2024-06-20 DIAGNOSIS — R65.21 SEVERE SEPSIS WITH SEPTIC SHOCK: ICD-10-CM

## 2024-06-20 DIAGNOSIS — Z79.82 LONG TERM (CURRENT) USE OF ASPIRIN: ICD-10-CM

## 2024-06-20 DIAGNOSIS — N17.9 ACUTE KIDNEY FAILURE, UNSPECIFIED: ICD-10-CM

## 2024-06-20 DIAGNOSIS — M19.90 UNSPECIFIED OSTEOARTHRITIS, UNSPECIFIED SITE: ICD-10-CM

## 2024-06-20 DIAGNOSIS — H35.30 UNSPECIFIED MACULAR DEGENERATION: ICD-10-CM

## 2024-06-20 DIAGNOSIS — J69.0 PNEUMONITIS DUE TO INHALATION OF FOOD AND VOMIT: ICD-10-CM

## 2024-06-20 DIAGNOSIS — A41.9 SEPSIS, UNSPECIFIED ORGANISM: ICD-10-CM

## 2024-06-20 DIAGNOSIS — R60.0 LOCALIZED EDEMA: ICD-10-CM

## 2024-06-20 DIAGNOSIS — R73.9 HYPERGLYCEMIA, UNSPECIFIED: ICD-10-CM

## 2024-06-20 DIAGNOSIS — E03.9 HYPOTHYROIDISM, UNSPECIFIED: ICD-10-CM

## 2024-06-20 DIAGNOSIS — M81.0 AGE-RELATED OSTEOPOROSIS WITHOUT CURRENT PATHOLOGICAL FRACTURE: ICD-10-CM

## 2024-06-20 DIAGNOSIS — F03.90 UNSPECIFIED DEMENTIA, UNSPECIFIED SEVERITY, WITHOUT BEHAVIORAL DISTURBANCE, PSYCHOTIC DISTURBANCE, MOOD DISTURBANCE, AND ANXIETY: ICD-10-CM

## 2024-06-20 DIAGNOSIS — N39.0 URINARY TRACT INFECTION, SITE NOT SPECIFIED: ICD-10-CM

## 2024-06-20 DIAGNOSIS — R53.83 OTHER FATIGUE: ICD-10-CM

## 2024-06-20 DIAGNOSIS — J18.9 PNEUMONIA, UNSPECIFIED ORGANISM: ICD-10-CM

## 2024-06-20 DIAGNOSIS — J96.91 RESPIRATORY FAILURE, UNSPECIFIED WITH HYPOXIA: ICD-10-CM

## 2024-06-20 DIAGNOSIS — Z86.73 PERSONAL HISTORY OF TRANSIENT ISCHEMIC ATTACK (TIA), AND CEREBRAL INFARCTION WITHOUT RESIDUAL DEFICITS: ICD-10-CM

## 2024-06-20 DIAGNOSIS — R13.10 DYSPHAGIA, UNSPECIFIED: ICD-10-CM

## 2024-06-20 DIAGNOSIS — Z51.5 ENCOUNTER FOR PALLIATIVE CARE: ICD-10-CM

## 2024-06-20 DIAGNOSIS — I48.91 UNSPECIFIED ATRIAL FIBRILLATION: ICD-10-CM

## 2024-06-20 DIAGNOSIS — I35.0 NONRHEUMATIC AORTIC (VALVE) STENOSIS: ICD-10-CM

## 2024-06-20 DIAGNOSIS — I10 ESSENTIAL (PRIMARY) HYPERTENSION: ICD-10-CM

## 2024-06-20 DIAGNOSIS — Z66 DO NOT RESUSCITATE: ICD-10-CM

## 2024-10-30 NOTE — PHYSICAL THERAPY INITIAL EVALUATION ADULT - SIT-TO-STAND BALANCE
"CMOC received incoming voicemail from Anselmo at [facility].     CMOC placed a return call to Anselmo. Anselmo stated that, in regards to patient med-1 form, he cannot sign off on it as a clinician. Anselmo stated that Dr Monroe, who oversees Psychiatric medications at their office, is not willing to sign it as he is not treating patient with medication. Anselmo stated that Dr Monroe has only had 2 appointments with patient and that included his initial evaluation and a follow up appointment. Anselmo stated Dr Monroe informed patient at that appointment he would not complete the form. nAselmo stated that he has met with patient for 3 or 4 sessions, as he \"inherited\" him from another therapist.     CMOC discussed records release the patient had requested. CMOC advised Anselmo that our physicians will not likely complete form based upon records, as they are not treating the patient. CMOC provided fax number.       "
CMOC received incoming call from Yadi at [facility].     Yadi stated that patient has requested med 1 form be sent to various providers over the past 8 months. Yadi stated patient must have a face to face appointment within 90 days.     CMOC advised that records will be sent to PCP from [facility].   
Diagnosis and Plan reviewed with patient.  
good minus

## 2024-12-31 NOTE — ED ADULT NURSE REASSESSMENT NOTE - GASTROINTESTINAL ASSESSMENT
ALPRAZolam (XANAX) 0.25 MG tablet         Last Written Prescription Date:  11/20/24  Last Fill Quantity: 60,   # refills: 1  Last Office Visit: 12/11/24  Future Office visit:       Routing refill request to provider for review/approval because:  Drug not on the [facility], P or [facility] refill protocol or controlled substance    Patient comment: I will need at least one fill of this medication before my appointment to establish new primary care on Jan 27th     
- - -
- - -

## 2025-01-07 NOTE — CDI QUERY NOTE - NSCDINOTECODERNU_GEN_A_CORE_FT
[phone]
Patient updated PHQ-9 depression screening.  Score: 1  Patient identifies the following symptoms, trouble falling asleep /staying asleep.  Provider updated.  Paper copy scanned into chart.  
Verbal order per JEFFREY HOUSER CNP for urine drug screen. Positive for BUP.  
Insecurity (6/25/2024)    Hunger Vital Sign     Worried About Running Out of Food in the Last Year: Never true     Ran Out of Food in the Last Year: Never true   Transportation Needs: Unknown (6/25/2024)    PRAPARE - Transportation     Lack of Transportation (Medical): Not on file     Lack of Transportation (Non-Medical): No   Physical Activity: Not on file   Stress: Not on file   Social Connections: Not on file   Intimate Partner Violence: Not on file   Housing Stability: Unknown (6/25/2024)    Housing Stability Vital Sign     Unable to Pay for Housing in the Last Year: Not on file     Number of Places Lived in the Last Year: Not on file     Unstable Housing in the Last Year: No         Current Outpatient Medications on File Prior to Visit   Medication Sig Dispense Refill    ARIPiprazole (ABILIFY) 10 MG tablet Take 1 tablet by mouth daily 30 tablet 0    buprenorphine-naloxone (SUBOXONE) 8-2 MG SUBL SL tablet Place 1.5 tablets under the tongue daily for 28 days. Max Daily Amount: 1.5 tablets 42 tablet 0    prazosin (MINIPRESS) 5 MG capsule Take 1 capsule by mouth nightly 30 capsule 3    FLUoxetine (PROZAC) 10 MG capsule Take 2 capsules by mouth daily 180 capsule 1    aspirin-acetaminophen-caffeine (EXCEDRIN MIGRAINE) 250-250-65 MG per tablet Take 1 tablet by mouth every 6 hours as needed for Headaches      ibuprofen (ADVIL;MOTRIN) 400 MG tablet Take 2 tablets by mouth every 6 hours as needed for Pain       No current facility-administered medications on file prior to visit.           Vitals:    01/07/25 1702   BP: 126/74   Pulse:    Resp:         Cognition: alert, oriented to person, place, and time  Appearance: appropriate, no acute distress, does not appear intoxicated or in withdrawal  Memory: Normal  Behavioral/motor: normal  Affect: congruent  Attitude toward examiner: respectful, pleasant  Thought content: no delusions, hallucination, Denies suicidal ideation or intent  Insight: fair  Judgement: fair  Eyes:

## (undated) DEVICE — DRAPE C ARM UNIVERSAL

## (undated) DEVICE — VISIPAQUE POLYMER BTL 100ML 320MG

## (undated) DEVICE — SUT VICRYL 0 27" CT-2 UNDYED

## (undated) DEVICE — DRSG TELFA 3 X 4

## (undated) DEVICE — GLV 8 ESTEEM BLUE

## (undated) DEVICE — DRAPE ISOLATION W IOBAN & POUCH

## (undated) DEVICE — ELCTR GROUNDING PAD ADULT COVIDIEN

## (undated) DEVICE — DRAPE TOWEL BLUE 17" X 24"

## (undated) DEVICE — PACK MINOR WITH LAP

## (undated) DEVICE — DRAPE U LONG 47X70"

## (undated) DEVICE — SUT DERMABOND 0.7ML

## (undated) DEVICE — FRAZIER SUCTION TIP 10FR

## (undated) DEVICE — WRAP COMPRESSION CALF MED

## (undated) DEVICE — BLANKET WARMER UPPER ADULT

## (undated) DEVICE — SUT MONOCRYL 2-0 27" SH UNDYED

## (undated) DEVICE — GLV 7.5 PROTEXIS

## (undated) DEVICE — NDL HYPO REGULAR BEVEL 25G X 1.5"

## (undated) DEVICE — DRSG WEBRIL 6"